# Patient Record
Sex: MALE | Race: BLACK OR AFRICAN AMERICAN | NOT HISPANIC OR LATINO
[De-identification: names, ages, dates, MRNs, and addresses within clinical notes are randomized per-mention and may not be internally consistent; named-entity substitution may affect disease eponyms.]

---

## 2020-06-08 ENCOUNTER — ERX REFILL RESPONSE (OUTPATIENT)
Age: 80
End: 2020-06-08

## 2020-06-08 RX ORDER — OBETICHOLIC ACID 10 MG/1
TAKE 1 TABLET ONCE DAILY TABLET, FILM COATED ORAL
Qty: 30 | Refills: 0

## 2020-06-10 ENCOUNTER — TELEPHONE ENCOUNTER (OUTPATIENT)
Dept: URBAN - METROPOLITAN AREA CLINIC 92 | Facility: CLINIC | Age: 80
End: 2020-06-10

## 2020-06-10 RX ORDER — OBETICHOLIC ACID 10 MG/1
TAKE 1 TABLET ONCE DAILY TABLET, FILM COATED ORAL
Qty: 30 | Refills: 0

## 2020-06-23 ENCOUNTER — ERX REFILL RESPONSE (OUTPATIENT)
Age: 80
End: 2020-06-23

## 2020-06-23 RX ORDER — URSODIOL 500 MG/1
TAKE 1 TABLET TWICE A DAY TABLET ORAL
Qty: 180 | Refills: 0

## 2020-07-27 ENCOUNTER — LAB OUTSIDE AN ENCOUNTER (OUTPATIENT)
Dept: URBAN - METROPOLITAN AREA CLINIC 86 | Facility: CLINIC | Age: 80
End: 2020-07-27

## 2020-07-28 ENCOUNTER — TELEPHONE ENCOUNTER (OUTPATIENT)
Dept: URBAN - METROPOLITAN AREA CLINIC 92 | Facility: CLINIC | Age: 80
End: 2020-07-28

## 2020-07-28 LAB
A/G RATIO: 1.2
ALBUMIN: 4.2
ALKALINE PHOSPHATASE: 343
ALT (SGPT): 61
AST (SGOT): 55
BASO (ABSOLUTE): 0
BASOS: 0
BILIRUBIN, TOTAL: 0.9
BUN/CREATININE RATIO: 20
BUN: 17
CALCIUM: 10.6
CARBON DIOXIDE, TOTAL: 24
CHLORIDE: 104
CREATININE: 0.83
EGFR IF AFRICN AM: 97
EGFR IF NONAFRICN AM: 84
EOS (ABSOLUTE): 0.3
EOS: 7
FREE THYROXINE INDEX: 2.7
GLOBULIN, TOTAL: 3.4
GLUCOSE: 134
HEMATOCRIT: 46.2
HEMATOLOGY COMMENTS:: (no result)
HEMOGLOBIN: 15.2
IMMATURE CELLS: (no result)
IMMATURE GRANS (ABS): 0
IMMATURE GRANULOCYTES: 0
INR: 1.1
LYMPHS (ABSOLUTE): 1.9
LYMPHS: 46
MCH: 29.3
MCHC: 32.9
MCV: 89
MONOCYTES(ABSOLUTE): 0.5
MONOCYTES: 13
NEUTROPHILS (ABSOLUTE): 1.4
NEUTROPHILS: 34
NRBC: (no result)
PLATELETS: 170
POTASSIUM: 4.8
PROTEIN, TOTAL: 7.6
PROTHROMBIN TIME: 11
RBC: 5.18
RDW: 12.3
SODIUM: 144
T3 UPTAKE: 32
THYROXINE (T4): 8.5
TRIIODOTHYRONINE (T3): 165
TSH: <0.005
WBC: 4.1

## 2020-07-28 NOTE — HPI-TODAY'S VISIT:
Dear Roel Trejo The results of your recent tests are explained below: tsh very low and show local md.  T4 normal 8.5.  inr 1.1 and tb 0.9 and alk 343 and ast 55 and alt 61 and tb 0.9 and alb 4.2 and ca 10.6 elevated and show local md also. na 144 and k 4.8. glu 134 elevated and maybe not fasting. cr 0.83. wbc 4.1 hg 15.3 plat 170.  April ast 67 and alt 66 and alk 362 and tb 0.9 so liver labs little lower and alk little lower also. calcium prior 10.1 so up now: taking supplements? show to local md.  If do not have local md watching thyroid need to refer to endocrine specialist.

## 2020-08-06 ENCOUNTER — TELEPHONE ENCOUNTER (OUTPATIENT)
Dept: URBAN - METROPOLITAN AREA CLINIC 92 | Facility: CLINIC | Age: 80
End: 2020-08-06

## 2020-08-06 RX ORDER — OBETICHOLIC ACID 10 MG/1
1 TABLET TABLET, FILM COATED ORAL EVERY OTHER DAY
Qty: 15 | Refills: 2

## 2020-08-06 RX ORDER — URSODIOL 500 MG/1
TAKE 1 TABLET TWICE A DAY TABLET ORAL TWICE A DAY
Qty: 180 | Refills: 1

## 2020-08-08 ENCOUNTER — TELEPHONE ENCOUNTER (OUTPATIENT)
Dept: URBAN - METROPOLITAN AREA CLINIC 92 | Facility: CLINIC | Age: 80
End: 2020-08-08

## 2020-08-08 NOTE — HPI-TODAY'S VISIT:
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 Prior Liver bx showed bridging fibrosis. He is on urosodiol 300mg TID and has been on for years Ocaliva 10mg. A copy of this document will be sent to the referring provider.  The patient relates no significant family or personal history of liver disease. He states no history of new medications or alcohol use. The patient reports a personal history of no other habits that could cause liver damage.  Interval testing includes:     Pt is on the Ocaliva every other q other day.  They did approve the Ocaliva and we not have received it yet. Approved through april 2021.  He cut back to q other day. labs stable so keep on this dose.  4- wbc 4.0 and hg 14.3 and plat 155 and neutrophils 1.2 little low. glu 130 and cr 0.78 and na 140 and k 4.4 and cl 101 and co2 22 and alb 4.3 and tb 0.9 and alk 362 and  ast 67 and alt 66 and inr 1.1.  2-17-20 and wbc 4.1 hg 14.5 plat 153 and neutrophils 1.3 and glu 143 and cr 0.96 and na 140 and k 4.3 and cl 100 and co2 25 and alb 4.0 and tb 0.8 and alk 352 and ast 66 and alt 61 and inr 1.0.  12-16-19 and wbc 3.6 and hg 14.3 and plat 184 and neutrophils 1.3 and glu 124 and cr 0.86 and na 142 and k 5.0 and cl 102 and cl2 25 and alb 4.3 and tb 0.9 and alk 353 and ast 70 and alt 67 and inr 1.0.  12- u/s coarse hepatic ehcotexure and consistent with cirrhosis and 1.2cm hepatic cyst. Patent vessels and right renal cysts  up to 5.7cm and ssome nonobstructing right renal calculi. Spleen 12.3cm.   He sees urology and watching this and he sees him once a year.  Oct 14 2019 wbc 3.8 and hg 14.4 plat 177 and neutrophils 1.3 little low and glu 123 and cr 0.74 and na 141 and k 4.5 and cl 100 and co2 25 and alb 4.3 and tb 0.9 and alk 336 ast 67 and alt 61.  hep b immune 40.5  Sept 18 2019 na 140 and k 4.7 and cl 103 and glu 121 and bun 12 and cr 0.79 alb 3,8 and tb 0,9 and ca 9.6 and ast 49 and alt 48 and alk 334 and a1c 5.7 and chol 228 and trg 52 and hdl 86 and ldl 132 nd psa 0.01 and wbc 4.2 and hg 144 and plat 183.   June 19 2019 and liver midly coarse and 1cm hepatic cyst and stable and gb not distended and no stones and bile ducts not dilated and spleen stable 12.3cm abd right kdiney 11cm and sevreal right kidney cysts up to 6.2cm.  Saw stone sin the right kidneuy. No hydronephrosios. Vessels patent.  No change vs 2018.   Dec 2018 u.s with liver appearing fatty and patent vessels. Right kidney cyst 6.1x4.7x5.3cm stable abd simple.  Liver cyst 1.1x0.8x1.1.cm. Similar to prior scan.  April 2019 labs wbc 4.3 hg 14.7 plat 183 and glu 134 and cr 0.85 and na 141 k 5.1 and tb 1.0 and alk 446 and ast 85 and alt 91.  Feb 2019 alk 418 and ast 79 and alt 81.  Dec 2018 alk 440 and tb 0.9 and db 0.48 and ast 76 and alt 92. Liver 76% and bone 20% and intestine 4%.  11/26/2018: wbc 4.6, hgb 14.7, plts 175,000, gluc 130, vret 0.89, na 142, k 4.0, alb 4.4, frances 2.9, t.bili 1.0, , AST 75, ASLT 80,INR 1.0, TSH 2.26  9/04/2018: wbc 4.6, hgb 14.4 plts 166,000, gluc 130, creat 0.95, na 143, k 4.5, alb 4.3, frances 3.1, t.bili 0.8, , AST56, ALT 62, INR 1.0, TSH 2.17,  6/05/2018: HBsAb 4.5, HBsAg neg, HBcAb neg, HAV  pos  6/20/2018: liver echogenic suggesting mild fatty infiltration, simle cyst 1.1 x 0.7 x 1.3,, gallbladder unremarkable, mpv  patent, cbd 4mm, right kidney simple cyst 5.6 x 4.8 x 5.2 cm, echoegenic focus 1.3cm consider kidney stone.  U/s showed fatty liver and he was placed on a low glycemic carbohydrate and stable on this.  His cholesterol is checked by Dr. Smith. he says cholesterol has been ok.  Recommended him again to get a bone density and he did this at [facility]: 11-13-19: spine normal and t score 0.1 and z score 0.1/ femur neck -0.9 and -0.4 and femur total -1.0 and -0.9. He will share with primary md.  We rediscussed that he needs at least  4 hour window with the ocaliva and welchol.  Otherwise he may not be absorbing the ocaliva.  He is not doing any teas or herbals.  Plan is to do labs in 3 m and see how doing and not having the sig pruritis then may want to try to do the full dose pendign how he is and the labs.  He will call us if any issues in the interim.  Duration of the visit was min office visit with greater than 50% of the time spent on coordination of care and in reviewing chart with the pt.

## 2020-08-10 ENCOUNTER — OFFICE VISIT (OUTPATIENT)
Dept: URBAN - METROPOLITAN AREA TELEHEALTH 2 | Facility: TELEHEALTH | Age: 80
End: 2020-08-10
Payer: MEDICARE

## 2020-08-10 DIAGNOSIS — E66.3 OVERWEIGHT: ICD-10-CM

## 2020-08-10 DIAGNOSIS — L29.9 PRURITIC CONDITION: ICD-10-CM

## 2020-08-10 DIAGNOSIS — R74.8 ELEVATED ALKALINE PHOSPHATASE LEVEL: ICD-10-CM

## 2020-08-10 DIAGNOSIS — R94.5 LIVER FUNCTION STUDY, ABNORMAL: ICD-10-CM

## 2020-08-10 DIAGNOSIS — Z79.899 HIGH RISK MEDICATION USE: ICD-10-CM

## 2020-08-10 DIAGNOSIS — E11.9 DIABETES: ICD-10-CM

## 2020-08-10 DIAGNOSIS — K76.0 FATTY LIVER: ICD-10-CM

## 2020-08-10 DIAGNOSIS — Z13.820 SCREENING FOR OSTEOPOROSIS: ICD-10-CM

## 2020-08-10 DIAGNOSIS — N20.0 RENAL STONE: ICD-10-CM

## 2020-08-10 DIAGNOSIS — N28.1 RENAL CYST: ICD-10-CM

## 2020-08-10 DIAGNOSIS — K74.0 HEPATIC FIBROSIS: ICD-10-CM

## 2020-08-10 DIAGNOSIS — K74.3 PBC (PRIMARY BILIARY CIRRHOSIS): ICD-10-CM

## 2020-08-10 PROCEDURE — G8417 CALC BMI ABV UP PARAM F/U: HCPCS

## 2020-08-10 PROCEDURE — 99214 OFFICE O/P EST MOD 30 MIN: CPT

## 2020-08-10 PROCEDURE — 1036F TOBACCO NON-USER: CPT

## 2020-08-10 PROCEDURE — G9903 PT SCRN TBCO ID AS NON USER: HCPCS

## 2020-08-10 PROCEDURE — G8427 DOCREV CUR MEDS BY ELIG CLIN: HCPCS

## 2020-08-10 RX ORDER — AMLODIPINE BESYLATE 5 MG/1
TAKE 1 TABLET (5 MG) BY ORAL ROUTE ONCE DAILY TABLET ORAL 1
Qty: 0 | Refills: 0 | Status: ACTIVE | COMMUNITY
Start: 1900-01-01

## 2020-08-10 RX ORDER — URSODIOL 500 MG/1
TAKE 1 TABLET TWICE A DAY TABLET ORAL TWICE A DAY
Qty: 180 | Refills: 1 | Status: ACTIVE | COMMUNITY

## 2020-08-10 RX ORDER — OBETICHOLIC ACID 10 MG/1
1 TABLET TABLET, FILM COATED ORAL EVERY OTHER DAY
Qty: 15 | Refills: 2 | Status: ACTIVE | COMMUNITY

## 2020-08-10 RX ORDER — GLIPIZIDE 5 MG/1
0.5 TABLET 30 MINUTES BEFORE BREAKFAST TABLET ORAL ONCE A DAY
Status: ACTIVE | COMMUNITY

## 2020-08-10 NOTE — EXAM-PHYSICAL EXAM
Telemed:  Gen: no distress. Eyes: no jaundice. Mouth: no thrush. CV: no chest pain. Resp: no wheezes. Abd: no pain. Ext: no edema.	 Neuro: no weakness.

## 2020-08-10 NOTE — HPI-TODAY'S VISIT:
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   Prior Liver bx showed bridging fibrosis. He is on urosodiol 300mg TID and has been on for years Ocaliva 10mg.   A copy of this document will be sent to the referring provider.   The patient relates no significant family or personal history of liver disease. He states no history of new medications or alcohol use. The patient reports a personal history of no other habits that could cause liver damage.   Interval testing includes:    Pt is on the Ocaliva every other q other day.   They did approve the Ocaliva and we not have received it yet. Approved through 2021.  He cut back to q other day. labs stable so keep on this dose.  2020: tsh very low and show local md.  T4 normal 8.5.  inr 1.1 and tb 0.9 and alk 343 and ast 55 and alt 61 and tb 0.9 and alb 4.2 and ca 10.6 elevated and show local md also. na 144 and k 4.8. glu 134 elevated and maybe not fasting. cr 0.83. wbc 4.1 hg 15.3 plat 170.  Prior April ast 67 and alt 66 and alk 362 and tb 0.9 so liver labs little lower this last time despite the low dose and alk little lower also. calcium prior 10.1 and is not on any calcium supplements.   Needs to thyroid level to local doctor.    2020 wbc 4.0 and hg 14.3 and plat 155 and neutrophils 1.2 little low. glu 130 and cr 0.78 and na 140 and k 4.4 and cl 101 and co2 22 and alb 4.3 and tb 0.9 and alk 362 and  ast 67 and alt 66 and inr 1.1.  2-17-20 and wbc 4.1 hg 14.5 plat 153 and neutrophils 1.3 and glu 143 and cr 0.96 and na 140 and k 4.3 and cl 100 and co2 25 and alb 4.0 and tb 0.8 and alk 352 and ast 66 and alt 61 and inr 1.0.  12-16-19 and wbc 3.6 and hg 14.3 and plat 184 and neutrophils 1.3 and glu 124 and cr 0.86 and na 142 and k 5.0 and cl 102 and cl2 25 and alb 4.3 and tb 0.9 and alk 353 and ast 70 and alt 67 and inr 1.0.  last u/s was done in dec 2019:  He did one today at [facility]:   Document Type:	US Abdomen Doppler Complete Document Date:	August 10, 2020 10:04  Document Status:	Auth (Verified) Document Title:	US Abdomen Doppler Complete Performed By:	Jack Martin  Verified By:	Candelaria Rodriguez on August 10, 2020 11:41  Encounter info:	[id number], [facility] Single Visit OP, 8/10/2020 -    * Final Report *  Reason For Exam primary billary cholangitis  REPORT EXAM: US Abdomen Doppler Complete, US Abdomen Complete  CLINICAL INDICATION: Primary billary cholangitis.  TECHNIQUE: Grayscale, pulsed wave and color Doppler sonography of the upper abdomen were performed. [id number]  COMPARISON: None  FINDINGS:  Liver: Coarsened echotexture. No lesions.  Bile Ducts: No dilated intrahepatic biliary radicles. Common duct measures 4 mm.  Gallbladder: Normal. Wick's sign is negative.  Pancreas: Partially obscured by overlying structures.  Right Kidney: Measures 11.4 cm. Normal echogenicity. No hydronephrosis. 6.4 x 5.9 x 5.3 cm inferior pole cystic lesion with internal thickened septation versus areas of nodularity, indeterminate. Hyperechoic nonshadowing cortical focus measuring 0.5 x 0.4 x 0.2 cm.. Nonshadowing hyperechoic focus near the corticomedullary junction measuring 1.1 x 0.8 x 0.6 cm.  Left Kidney: Measures 10.6 cm. Normal echogenicity. No hydronephrosis.  Spleen: Measures 12.2 cm.  Aorta: Normal caliber where imaged.  IVC: Normal proximally, not imaged distally.   Hepatic Veins: Normal.  Portal Veins: Hepatopetal flow. Velocity of 27 cm/s in the main portal vein, 25 cm/s in the right portal vein, and 20 cm/s in the left portal vein.  Hepatic Arteries: Peak systolic velocity 115 cm/s. Resistive index 0.77.  Other: None.  IMPRESSION: 1. Complex cystic lesion within the right kidney with thickened septation/areas of nodularity. Recommend MRI for further evaluation to exclude enhancing/solid components. Nonshadowing echogenic foci in the right kidney may represent nonobstructing stones although underlying lesions cannot be excluded. This can be evaluated at the time of MRI. 2. Coarsened hepatic echotexture can be seen with hepatic steatosis or chronic liver disease. No intra or extrahepatic biliary ductal dilation. Patent hepatic vasculature.  The images were reviewed and interpreted by Candelaria Rodriguez MD.  Signature Line *** Final ***  Electronically Signed By:  Candelaria Rodriguez on  08/10/2020 11:41  Dictated by:  Jack Martin  He has a urologist at [facility] and he should be able to see this. I will print the us report and he can get the name of the doctor then we can fax the report to them to review and they recommend to do an Mri to look further at the areas of thickening.  2019 u/s coarse hepatic ehcotexure and consistent with cirrhosis and 1.2cm hepatic cyst. Patent vessels and right renal cysts  up to 5.7cm and some nonobstructing right renal calculi. Spleen 12.3cm.   He sees urology and he has been seeing him once a year.  Oct 14 2019 wbc 3.8 and hg 14.4 plat 177 and neutrophils 1.3 little low and glu 123 and cr 0.74 and na 141 and k 4.5 and cl 100 and co2 25 and alb 4.3 and tb 0.9 and alk 336 ast 67 and alt 61.  hep b immune 40.5  2019 na 140 and k 4.7 and cl 103 and glu 121 and bun 12 and cr 0.79 alb 3,8 and tb 0,9 and ca 9.6 and ast 49 and alt 48 and alk 334 and a1c 5.7 and chol 228 and trg 52 and hdl 86 and ldl 132 nd psa 0.01 and wbc 4.2 and hg 144 and plat 183.   2019 and liver midly coarse and 1cm hepatic cyst and stable and gb not distended and no stones and bile ducts not dilated and spleen stable 12.3cm abd right kdiney 11cm and sevreal right kidney cysts up to 6.2cm.  Saw stone sin the right kidneuy. No hydronephrosios. Vessels patent.  No change vs 2018.   Dec 2018 u.s with liver appearing fatty and patent vessels. Right kidney cyst 6.1x4.7x5.3cm stable abd simple.  Liver cyst 1.1x0.8x1.1.cm. Similar to prior scan.  2019 labs wbc 4.3 hg 14.7 plat 183 and glu 134 and cr 0.85 and na 141 k 5.1 and tb 1.0 and alk 446 and ast 85 and alt 91.  2019 alk 418 and ast 79 and alt 81.  Dec 2018 alk 440 and tb 0.9 and db 0.48 and ast 76 and alt 92. Liver 76% and bone 20% and intestine 4%.  2018: wbc 4.6, hgb 14.7, plts 175,000, gluc 130, vret 0.89, na 142, k 4.0, alb 4.4, frances 2.9, t.bili 1.0, , AST 75, ASLT 80,INR 1.0, TSH 2.26  2018: wbc 4.6, hgb 14.4 plts 166,000, gluc 130, creat 0.95, na 143, k 4.5, alb 4.3, frances 3.1, t.bili 0.8, , AST56, ALT 62, INR 1.0, TSH 2.17,  2018: HBsAb 4.5, HBsAg neg, HBcAb neg, HAV  pos  2018: liver echogenic suggesting mild fatty infiltration, simle cyst 1.1 x 0.7 x 1.3,, gallbladder unremarkable, mpv  patent, cbd 4mm, right kidney simple cyst 5.6 x 4.8 x 5.2 cm, echoegenic focus 1.3cm consider kidney stone.  U/s showed fatty liver and he was placed on a low glycemic carbohydrate and stable on this.  His cholesterol is checked by Dr. Smith. he says cholesterol has been ok.  Recommended him again to get a bone density and he did this at [facility]: 19: spine normal and t score 0.1 and z score 0.1/ femur neck -0.9 and -0.4 and femur total -1.0 and -0.9. He will share with primary md.  We prior rediscussed that he needs at least  4 hour window with the ocaliva and welchol.  Otherwise he may not be absorbing the ocaliva.  He is not doing any teas or herbals.  Plan: 1. redo the labs in 3 m. 2. He will have the report sent to the urologist when he gets us the info to review as [facility] recommends doing an mri and may want to do locally or qat least review with him. 3. [facility] can burn it on disc for him to show if not able to be seen there. 4. Pt will stay on the qod ocaliva and we will reassess then.  Stressed to pt the need for social distancing and strict handwashing and wearing a mask and to follow any other new or added CDC recommendations as this is an evolving target.  Duration of the visit was 27 min office visit with greater than 50% of the time spent on coordination of care and in reviewing chart with the pt.   More than half of the face-to-face time used for counseling and coordination of care.  Patient seen today via telehealth by agreement and consent of patient in light of current COVID-19 pandemic. I used video conferencing during the visit. The patient encounter is appropriate and reasonable under the circumstances given the patient's particular presentation at this time. The patient has been advised of the followin) the potential risks and limitations of this mode of treatment (including but not limited to the absence of in-person examination); 2) the right to refuse telehealth services at any point without affecting the right to future care; 3) the right to receive in-person services, included immediately after this consultation if an urgent need arises; 4) information, including identifiable images or information from this telehealth consult, will only be shared in accordance with HIPAA regulations. Any and all of the patient's and/or patient's family member's questions on this issue have been answered. The patient has verbally consented to be treated via telehealth services. The patient has also been advised to contact this office for worsening conditions or problems, and seek emergency medical treatment and/or call 911 if the patient deems either necessary.

## 2020-09-15 ENCOUNTER — TELEPHONE ENCOUNTER (OUTPATIENT)
Dept: URBAN - METROPOLITAN AREA CLINIC 92 | Facility: CLINIC | Age: 80
End: 2020-09-15

## 2020-09-15 NOTE — HPI-TODAY'S VISIT:
Khanh Mr Person,  Saw local mri: 9-1: nonenhancing renal cyst and no definite renal mass. Largest cyst right kidney 5.7cm.  Not surprisingly they thought liver appeared to be cirrhotic. Also apparent nonenhancing cysts in the liver up to 10.6mm. Nonspecific biliary dilation in liver. Extrahepatic duct appears decompressed. Nonspecific splenomegaly seen and no significant varices seen. Left adrenal suspected adenoma. Appendix seemed somewhat prominent to then at 6.4mm but was probably similar to prior per them. No definite gallstones.  Prior [place] imaging liver coarsened. They recommended mri to better see possible complex renal cyst.  Dr Longoria

## 2020-09-25 ENCOUNTER — TELEPHONE ENCOUNTER (OUTPATIENT)
Dept: URBAN - METROPOLITAN AREA CLINIC 92 | Facility: CLINIC | Age: 80
End: 2020-09-25

## 2020-09-25 NOTE — HPI-OTHER HISTORIES
spoke with pt and we need to get the disc from [facility] and drop it off here then we can get it reread and resee what they say about it.  He will do that. the vague reading will get better when more scans to compare to.   Then we can decide if redo the mri in 3m or 6m.

## 2020-11-10 ENCOUNTER — LAB OUTSIDE AN ENCOUNTER (OUTPATIENT)
Dept: URBAN - METROPOLITAN AREA TELEHEALTH 2 | Facility: TELEHEALTH | Age: 80
End: 2020-11-10

## 2020-11-25 ENCOUNTER — LAB OUTSIDE AN ENCOUNTER (OUTPATIENT)
Dept: URBAN - METROPOLITAN AREA CLINIC 86 | Facility: CLINIC | Age: 80
End: 2020-11-25

## 2020-11-26 ENCOUNTER — TELEPHONE ENCOUNTER (OUTPATIENT)
Dept: URBAN - METROPOLITAN AREA CLINIC 92 | Facility: CLINIC | Age: 80
End: 2020-11-26

## 2020-11-26 LAB
A/G RATIO: 1.6
ALBUMIN: 4.2
ALKALINE PHOSPHATASE: 354
ALT (SGPT): 60
AST (SGOT): 63
BASO (ABSOLUTE): 0
BASOS: 1
BILIRUBIN, TOTAL: 1.1
BUN/CREATININE RATIO: 18
BUN: 15
CALCIUM: 9.8
CARBON DIOXIDE, TOTAL: 25
CHLORIDE: 101
CREATININE: 0.85
EGFR IF AFRICN AM: 96
EGFR IF NONAFRICN AM: 83
EOS (ABSOLUTE): 0.3
EOS: 7
GLOBULIN, TOTAL: 2.7
GLUCOSE: 117
HEMATOCRIT: 44.4
HEMATOLOGY COMMENTS:: (no result)
HEMOGLOBIN: 15
IMMATURE CELLS: (no result)
IMMATURE GRANS (ABS): 0
IMMATURE GRANULOCYTES: 0
LYMPHS (ABSOLUTE): 2
LYMPHS: 47
MCH: 30.2
MCHC: 33.8
MCV: 90
MONOCYTES(ABSOLUTE): 0.6
MONOCYTES: 13
NEUTROPHILS (ABSOLUTE): 1.3
NEUTROPHILS: 32
NRBC: (no result)
PLATELETS: 150
POTASSIUM: 4.4
PROTEIN, TOTAL: 6.9
RBC: 4.96
RDW: 13
SODIUM: 139
WBC: 4.2

## 2020-11-26 NOTE — HPI-TODAY'S VISIT:
Dear Roel Trejo The results of your recent tests are explained below: nov 25: wbc 4.2 hg 15 and plat 150 and mcv 90.  Neutrophils 1.3 and prior 1.4 and lymphs 2.0 and prior 1.9. tb 1.1 and alk 354 and ast 63 and alt 60 and prior tb 0.9 and alk 343 and ast 55 and alt 61.  so about the same. glu 117 and down from 134. bun 15 and cr 0.85 and na 139 and k 4.4 and cl 101 and co2 25. alb 4.2 normal.

## 2020-12-11 ENCOUNTER — OFFICE VISIT (OUTPATIENT)
Dept: URBAN - METROPOLITAN AREA TELEHEALTH 2 | Facility: TELEHEALTH | Age: 80
End: 2020-12-11
Payer: MEDICARE

## 2020-12-11 DIAGNOSIS — R74.8 ELEVATED ALKALINE PHOSPHATASE LEVEL: ICD-10-CM

## 2020-12-11 DIAGNOSIS — K74.00 HEPATIC FIBROSIS: ICD-10-CM

## 2020-12-11 DIAGNOSIS — Z79.899 HIGH RISK MEDICATION USE: ICD-10-CM

## 2020-12-11 DIAGNOSIS — K74.3 PBC (PRIMARY BILIARY CIRRHOSIS): ICD-10-CM

## 2020-12-11 PROCEDURE — 99214 OFFICE O/P EST MOD 30 MIN: CPT

## 2020-12-11 RX ORDER — AMLODIPINE BESYLATE 5 MG/1
TAKE 1 TABLET (5 MG) BY ORAL ROUTE ONCE DAILY TABLET ORAL 1
Qty: 0 | Refills: 0 | Status: ACTIVE | COMMUNITY
Start: 1900-01-01

## 2020-12-11 RX ORDER — OBETICHOLIC ACID 10 MG/1
1 TABLET TABLET, FILM COATED ORAL EVERY OTHER DAY
Qty: 15 | Refills: 2 | Status: ACTIVE | COMMUNITY

## 2020-12-11 RX ORDER — URSODIOL 500 MG/1
TAKE 1 TABLET TWICE A DAY TABLET ORAL TWICE A DAY
Qty: 180 | Refills: 0

## 2020-12-11 RX ORDER — OBETICHOLIC ACID 10 MG/1
1 TABLET TABLET, FILM COATED ORAL EVERY OTHER DAY
Qty: 15 | Refills: 1

## 2020-12-11 RX ORDER — URSODIOL 500 MG/1
TAKE 1 TABLET TWICE A DAY TABLET ORAL TWICE A DAY
Qty: 180 | Refills: 1 | Status: ACTIVE | COMMUNITY

## 2020-12-11 RX ORDER — GLIPIZIDE 5 MG/1
0.5 TABLET 30 MINUTES BEFORE BREAKFAST TABLET ORAL ONCE A DAY
Status: ACTIVE | COMMUNITY

## 2020-12-11 NOTE — EXAM-PHYSICAL EXAM
Telemed self reported:  Gen: no distress. Eyes: no jaundice. Mouth: no thrush. CV: no chest pain. Resp: no wheezes. Abd: no pain. Ext: no edema.	 Neuro: no weakness. Skin: occ pruritis still.

## 2020-12-11 NOTE — HPI-TODAY'S VISIT:
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   Prior Liver bx showed bridging fibrosis. He is on urosodiol 300mg TID and has been on for years Ocaliva 10mg.   A copy of this document will be sent to the referring provider.   Pt says taking ocaliva 10mg q other day. Itching is more tolerable with this.  He did labs .  : wbc 4.2 hg 15 and plat 150 and mcv 90.  Neutrophils 1.3 and prior 1.4 and lymphs 2.0 and prior 1.9. tb 1.1 and alk 354 and ast 63 and alt 60 and prior tb 0.9 and alk 343 and ast 55 and alt 61.  So about the same. glu 117 and down from 134. bun 15 and cr 0.85 and na 139 and k 4.4 and cl 101 and co2 25. alb 4.2 normal.   Saw local mri: : nonenhancing renal cyst and no definite renal mass. Largest cyst right kidney 5.7cm.  Not surprisingly they thought liver appeared to be cirrhotic. Also apparent nonenhancing cysts in the liver up to 10.6mm. Nonspecific biliary dilation in liver. Extrahepatic duct appears decompressed. Nonspecific splenomegaly seen and no significant varices seen. Left adrenal suspected adenoma. Appendix seemed somewhat prominent to then at 6.4mm but was probably similar to prior per them. No definite gallstones.  Prior [place] imaging liver coarsened. They recommended mri to better see possible complex renal cyst.  Saw urologist re the kidney issue was stable. They resee him again in .  The patient relates no significant family or personal history of liver disease. He states no history of new medications or alcohol use. The patient reports a personal history of no other habits that could cause liver damage.   Approved through 2021.  2020: tsh very low and show local md.  T4 normal 8.5.  inr 1.1 and tb 0.9 and alk 343 and ast 55 and alt 61 and tb 0.9 and alb 4.2 and ca 10.6 elevated and show local md also. na 144 and k 4.8. glu 134 elevated and maybe not fasting. cr 0.83. wbc 4.1 hg 15.3 plat 170.    Prior April ast 67 and alt 66 and alk 362 and tb 0.9 so liver labs little lower this last time despite the low dose and alk little lower also. calcium prior 10.1 and is not on any calcium supplements.   He says local doctor following thyroid and says he is doing better.   2020 wbc 4.0 and hg 14.3 and plat 155 and neutrophils 1.2 little low. glu 130 and cr 0.78 and na 140 and k 4.4 and cl 101 and co2 22 and alb 4.3 and tb 0.9 and alk 362 and  ast 67 and alt 66 and inr 1.1.  - and wbc 4.1 hg 14.5 plat 153 and neutrophils 1.3 and glu 143 and cr 0.96 and na 140 and k 4.3 and cl 100 and co2 25 and alb 4.0 and tb 0.8 and alk 352 and ast 66 and alt 61 and inr 1.0.  19 and wbc 3.6 and hg 14.3 and plat 184 and neutrophils 1.3 and glu 124 and cr 0.86 and na 142 and k 5.0 and cl 102 and cl2 25 and alb 4.3 and tb 0.9 and alk 353 and ast 70 and alt 67 and inr 1.0.  Document Type:	US Abdomen Doppler Complete Document Date:	August 10, 2020 10:04  Document Status:	Auth (Verified) Document Title:	US Abdomen Doppler Complete Performed By:	Jack Martin  Verified By:	Candelaria Rodriguez on August 10, 2020 11:41  Encounter info:	[id number], [facility], Single Visit OP, 8/10/2020 -    * Final Report *  Reason For Exam primary billary cholangitis  REPORT EXAM: US Abdomen Doppler Complete, US Abdomen Complete  CLINICAL INDICATION: Primary billary cholangitis.  TECHNIQUE: Grayscale, pulsed wave and color Doppler sonography of the upper abdomen were performed. [id number]  COMPARISON: None  FINDINGS:  Liver: Coarsened echotexture. No lesions.  Bile Ducts: No dilated intrahepatic biliary radicles. Common duct measures 4 mm.  Gallbladder: Normal. Wick's sign is negative.  Pancreas: Partially obscured by overlying structures.  Right Kidney: Measures 11.4 cm. Normal echogenicity. No hydronephrosis. 6.4 x 5.9 x 5.3 cm inferior pole cystic lesion with internal thickened septation versus areas of nodularity, indeterminate. Hyperechoic nonshadowing cortical focus measuring 0.5 x 0.4 x 0.2 cm.. Nonshadowing hyperechoic focus near the corticomedullary junction measuring 1.1 x 0.8 x 0.6 cm.  Left Kidney: Measures 10.6 cm. Normal echogenicity. No hydronephrosis.  Spleen: Measures 12.2 cm.  Aorta: Normal caliber where imaged.  IVC: Normal proximally, not imaged distally.   Hepatic Veins: Normal.  Portal Veins: Hepatopetal flow. Velocity of 27 cm/s in the main portal vein, 25 cm/s in the right portal vein, and 20 cm/s in the left portal vein.  Hepatic Arteries: Peak systolic velocity 115 cm/s. Resistive index 0.77.  Other: None.  IMPRESSION: 1. Complex cystic lesion within the right kidney with thickened septation/areas of nodularity. Recommend MRI for further evaluation to exclude enhancing/solid components. Nonshadowing echogenic foci in the right kidney may represent nonobstructing stones although underlying lesions cannot be excluded. This can be evaluated at the time of MRI. 2. Coarsened hepatic echotexture can be seen with hepatic steatosis or chronic liver disease. No intra or extrahepatic biliary ductal dilation. Patent hepatic vasculature.  The images were reviewed and interpreted by Candelaria Rodriguez MD.  Signature Line *** Final ***  Electronically Signed By:  Candelaria Rodriguez on  08/10/2020 11:41  Dictated by:  Jack Martin  2019 u/s coarse hepatic ehcotexure and consistent with cirrhosis and 1.2cm hepatic cyst. Patent vessels and right renal cysts  up to 5.7cm and some nonobstructing right renal calculi. Spleen 12.3cm.   Oct 14 2019 wbc 3.8 and hg 14.4 plat 177 and neutrophils 1.3 little low and glu 123 and cr 0.74 and na 141 and k 4.5 and cl 100 and co2 25 and alb 4.3 and tb 0.9 and alk 336 ast 67 and alt 61.  hep b immune 40.5  2019 na 140 and k 4.7 and cl 103 and glu 121 and bun 12 and cr 0.79 alb 3,8 and tb 0,9 and ca 9.6 and ast 49 and alt 48 and alk 334 and a1c 5.7 and chol 228 and trg 52 and hdl 86 and ldl 132 nd psa 0.01 and wbc 4.2 and hg 144 and plat 183.   2019 and liver midly coarse and 1cm hepatic cyst and stable and gb not distended and no stones and bile ducts not dilated and spleen stable 12.3cm abd right kdiney 11cm and sevreal right kidney cysts up to 6.2cm.  Saw stone sin the right kidneuy. No hydronephrosios. Vessels patent.  No change vs 2018.   Dec 2018 u.s with liver appearing fatty and patent vessels. Right kidney cyst 6.1x4.7x5.3cm stable abd simple.  Liver cyst 1.1x0.8x1.1.cm. Similar to prior scan.  2019 labs wbc 4.3 hg 14.7 plat 183 and glu 134 and cr 0.85 and na 141 k 5.1 and tb 1.0 and alk 446 and ast 85 and alt 91.  2019 alk 418 and ast 79 and alt 81.  Dec 2018 alk 440 and tb 0.9 and db 0.48 and ast 76 and alt 92. Liver 76% and bone 20% and intestine 4%.  2018: wbc 4.6, hgb 14.7, plts 175,000, gluc 130, vret 0.89, na 142, k 4.0, alb 4.4, frances 2.9, t.bili 1.0, , AST 75, ASLT 80,INR 1.0, TSH 2.26  2018: wbc 4.6, hgb 14.4 plts 166,000, gluc 130, creat 0.95, na 143, k 4.5, alb 4.3, frances 3.1, t.bili 0.8, , AST56, ALT 62, INR 1.0, TSH 2.17,  2018: HBsAb 4.5, HBsAg neg, HBcAb neg, HAV  pos  2018: liver echogenic suggesting mild fatty infiltration, simle cyst 1.1 x 0.7 x 1.3,, gallbladder unremarkable, mpv  patent, cbd 4mm, right kidney simple cyst 5.6 x 4.8 x 5.2 cm, echoegenic focus 1.3cm consider kidney stone.  U/s showed fatty liver and he was placed on a low glycemic carbohydrate and stable on this.  His cholesterol is checked by Dr. Sachin Dimas. He says cholesterol has been ok.  Recommended him again to get a bone density and he did this at [facility]: 19: spine normal and t score 0.1 and z score 0.1/ femur neck -0.9 and -0.4 and femur total -1.0 and -0.9. He shared with primary md.  We prior rediscussed that he needs at least  4 hour window with the ocaliva and welchol.  Otherwise he may not be absorbing the ocaliva.  He is not doing any teas or herbals.  Plan: 1. Redo the labs in 3 m. Stay on this dose of the ocaliva. 2. Plan to redo the mri here in  late then we can see in feb and have the disc taken to urology. 3. Pt will call if any issues.   Stressed to pt the need for social distancing and strict handwashing and wearing a mask and to follow any other new or added CDC recommendations as this is an evolving target.  Duration of the visit was 27 min via [employer] video for this telemed office visit with greater than 50% of the time spent on coordination of care and in reviewing chart with the pt.   More than half of the face-to-face time used for counseling and coordination of care.  Patient seen today via telehealth by agreement and consent of patient in light of current COVID-19 pandemic. I used video conferencing during the visit. The patient encounter is appropriate and reasonable under the circumstances given the patient's particular presentation at this time. The patient has been advised of the followin) the potential risks and limitations of this mode of treatment (including but not limited to the absence of in-person examination); 2) the right to refuse telehealth services at any point without affecting the right to future care; 3) the right to receive in-person services, included immediately after this consultation if an urgent need arises; 4) information, including identifiable images or information from this telehealth consult, will only be shared in accordance with HIPAA regulations. Any and all of the patient's and/or patient's family member's questions on this issue have been answered. The patient has verbally consented to be treated via telehealth services. The patient has also been advised to contact this office for worsening conditions or problems, and seek emergency medical treatment and/or call 911 if the patient deems either necessary.

## 2020-12-22 ENCOUNTER — TELEPHONE ENCOUNTER (OUTPATIENT)
Dept: URBAN - METROPOLITAN AREA CLINIC 100 | Facility: CLINIC | Age: 80
End: 2020-12-22

## 2020-12-30 ENCOUNTER — LAB OUTSIDE AN ENCOUNTER (OUTPATIENT)
Dept: URBAN - METROPOLITAN AREA CLINIC 6 | Facility: CLINIC | Age: 80
End: 2020-12-30

## 2020-12-30 ENCOUNTER — TELEPHONE ENCOUNTER (OUTPATIENT)
Dept: URBAN - METROPOLITAN AREA CLINIC 6 | Facility: CLINIC | Age: 80
End: 2020-12-30

## 2020-12-30 NOTE — HPI-TODAY'S VISIT:
Spoke with pt. He will stop the medicine for 2 weeks and he needs to do labs now and in 2 weeks and see how he is doing.  He has priopr stopped this and then restarted at lower dose and that helps some pts to feel better,.  We will see how he does. mayneed to change to 5mg tablet so more flexibility.

## 2021-01-01 ENCOUNTER — TELEPHONE ENCOUNTER (OUTPATIENT)
Dept: URBAN - METROPOLITAN AREA CLINIC 92 | Facility: CLINIC | Age: 81
End: 2021-01-01

## 2021-01-01 LAB
A/G RATIO: 1.3
ALBUMIN: 4.3
ALKALINE PHOSPHATASE: 399
ALT (SGPT): 65
AST (SGOT): 64
BASO (ABSOLUTE): 0
BASOS: 1
BILIRUBIN, TOTAL: 0.9
BUN/CREATININE RATIO: 14
BUN: 15
CALCIUM: 10
CARBON DIOXIDE, TOTAL: 27
CHLORIDE: 99
CREATININE: 1.07
EGFR IF AFRICN AM: 76
EGFR IF NONAFRICN AM: 66
EOS (ABSOLUTE): 0.2
EOS: 5
GLOBULIN, TOTAL: 3.3
GLUCOSE: 204
HEMATOCRIT: 46.7
HEMATOLOGY COMMENTS:: (no result)
HEMOGLOBIN: 15.1
IMMATURE CELLS: (no result)
IMMATURE GRANS (ABS): 0
IMMATURE GRANULOCYTES: 0
LYMPHS (ABSOLUTE): 1.6
LYMPHS: 41
MCH: 29.7
MCHC: 32.3
MCV: 92
MONOCYTES(ABSOLUTE): 0.5
MONOCYTES: 13
NEUTROPHILS (ABSOLUTE): 1.6
NEUTROPHILS: 40
NRBC: (no result)
PLATELETS: 167
POTASSIUM: 4.7
PROTEIN, TOTAL: 7.6
RBC: 5.08
RDW: 12.7
SODIUM: 139
WBC: 3.9

## 2021-01-01 NOTE — HPI-OTHER HISTORIES
Dear Roel Trejo The results of your recent tests are explained below: dec 30 labs in middle: wbc 3.9 hg 15.1 plat 167. mcv 97.  tb 0.9 down from 1.1 and alk 399 slightly higher from 354. ast 64 and alt 66 and prior ast 63 and alt 60. na 139 and k 4.7 and cl 99 and co2 27.  glu 204 elevated so that is newer issue as prior 117 and 134 so show to local md. bun 15 and cr 1.07 little up and prior 0.85 so little dry and could be linked to the sugars. Called pt to discuss this with the pt so he can discuss with primary md.

## 2021-01-13 ENCOUNTER — LAB OUTSIDE AN ENCOUNTER (OUTPATIENT)
Dept: URBAN - METROPOLITAN AREA CLINIC 6 | Facility: CLINIC | Age: 81
End: 2021-01-13

## 2021-01-14 ENCOUNTER — TELEPHONE ENCOUNTER (OUTPATIENT)
Dept: URBAN - METROPOLITAN AREA CLINIC 92 | Facility: CLINIC | Age: 81
End: 2021-01-14

## 2021-01-15 ENCOUNTER — LAB OUTSIDE AN ENCOUNTER (OUTPATIENT)
Dept: URBAN - METROPOLITAN AREA CLINIC 86 | Facility: CLINIC | Age: 81
End: 2021-01-15

## 2021-01-17 ENCOUNTER — TELEPHONE ENCOUNTER (OUTPATIENT)
Dept: URBAN - METROPOLITAN AREA CLINIC 92 | Facility: CLINIC | Age: 81
End: 2021-01-17

## 2021-01-17 LAB
A/G RATIO: 1.3
ALBUMIN: 4.2
ALKALINE PHOSPHATASE: 392
ALT (SGPT): 68
AST (SGOT): 60
BASO (ABSOLUTE): 0
BASOS: 1
BILIRUBIN, TOTAL: 0.9
BUN/CREATININE RATIO: 18
BUN: 16
CALCIUM: 10.1
CARBON DIOXIDE, TOTAL: 27
CHLORIDE: 102
CREATININE: 0.91
EGFR IF AFRICN AM: 92
EGFR IF NONAFRICN AM: 80
EOS (ABSOLUTE): 0.2
EOS: 5
GLOBULIN, TOTAL: 3.3
GLUCOSE: 117
HEMATOCRIT: 45.2
HEMATOLOGY COMMENTS:: (no result)
HEMOGLOBIN: 14.8
IMMATURE CELLS: (no result)
IMMATURE GRANS (ABS): 0
IMMATURE GRANULOCYTES: 0
INR: 1.1
LYMPHS (ABSOLUTE): 2
LYMPHS: 49
MCH: 29.7
MCHC: 32.7
MCV: 91
MONOCYTES(ABSOLUTE): 0.5
MONOCYTES: 12
NEUTROPHILS (ABSOLUTE): 1.4
NEUTROPHILS: 33
NRBC: (no result)
PLATELETS: 165
POTASSIUM: 4.7
PROTEIN, TOTAL: 7.5
PROTHROMBIN TIME: 11.3
RBC: 4.98
RDW: 12.9
SODIUM: 141
WBC: 4.1

## 2021-01-17 NOTE — HPI-TODAY'S VISIT:
Dear Roel Trejo The results of your recent tests are explained below: San[place] 15: glu 117 elevated some but similar to nov 117 but down from 204 in dec 30. bun 16 and cr 0.91. na 141 and k 4.7 and cl 102 and ca 10.1 and alb 4.2 and tb 0.9 down from 1.1 in nov. ast 60 and alt 68 and so about the same as you can see vs other two labs and alk 392 about the same vs dec but little up from nov 354. wbc 4.1 hg 14.8 plat 165. inr 1.1 stable. So no dramatic changes seen yet and would stay off the ocaliva and see if you feel better and if not then lets see also how doing off the medicine and if not clear worsening then we can discuss what to do at next visit as we spoke on phone other day,

## 2021-01-18 ENCOUNTER — LAB OUTSIDE AN ENCOUNTER (OUTPATIENT)
Dept: URBAN - METROPOLITAN AREA TELEHEALTH 2 | Facility: TELEHEALTH | Age: 81
End: 2021-01-18

## 2021-01-22 ENCOUNTER — TELEPHONE ENCOUNTER (OUTPATIENT)
Dept: URBAN - METROPOLITAN AREA CLINIC 6 | Facility: CLINIC | Age: 81
End: 2021-01-22

## 2021-01-25 ENCOUNTER — TELEPHONE ENCOUNTER (OUTPATIENT)
Dept: URBAN - METROPOLITAN AREA CLINIC 92 | Facility: CLINIC | Age: 81
End: 2021-01-25

## 2021-01-25 NOTE — HPI-OTHER HISTORIES
Dear Roel Trejo,  Jan 23 mri: Morphologic changes of chronic liver disease without hepatocellular carcinoma seen so that is good to note.   Patent portal venous system with stigmata portal hypertension, including upper abdominal varices and splenomegaly noted so need to stay on top of egd surveillance.   Right renal cyst with area of complexity on prior ultrasound demonstrates no septation, enhancement, or nodularity on this examination, compatible with a simple cyst ( 4.7 x 6.2 x 7.8 cm.) This may have represented artifact on prior ultrasound. Continued attention on follow-up for liver disease was recommended.  Liver showed no fat or iron.  Scattered simple cysts in the left hepatic lobe. Periesophageal, perigastric, perisplenic varices seen.  Mild intrahepatic duct dilatation peripherally extending to the capsule, greater in the right hepatic lobe, compatible with primary biliary cirrhosis. No extrahepatic biliary ductal dilatation. Normal gallbladder.  Spleen was enlarged measuring 13.1 cm in the craniocaudal dimension. Adjacent splenule.  Pancreas normal.   Mild atherosclerotic disease abdominal aorta without aneurysm.  Mild degenerative changes of the spine.  We will discuss more at the visit. Please share the report with local doctor following the cystic renal lesion.  Dr Longoria

## 2021-01-26 ENCOUNTER — TELEPHONE ENCOUNTER (OUTPATIENT)
Dept: URBAN - METROPOLITAN AREA CLINIC 92 | Facility: CLINIC | Age: 81
End: 2021-01-26

## 2021-01-26 LAB
A/G RATIO: 1.3
ALBUMIN: 4
ALKALINE PHOSPHATASE: 376
ALT (SGPT): 68
AST (SGOT): 65
BASO (ABSOLUTE): 0
BASOS: 1
BILIRUBIN, TOTAL: 0.8
BUN/CREATININE RATIO: 18
BUN: 17
CALCIUM: 10.1
CARBON DIOXIDE, TOTAL: 23
CHLORIDE: 103
CREATININE: 0.93
EGFR IF AFRICN AM: 90
EGFR IF NONAFRICN AM: 78
EOS (ABSOLUTE): 0.3
EOS: 7
GLOBULIN, TOTAL: 3.1
GLUCOSE: 128
HEMATOCRIT: 43.2
HEMATOLOGY COMMENTS:: (no result)
HEMOGLOBIN: 14.5
IMMATURE CELLS: (no result)
IMMATURE GRANS (ABS): 0
IMMATURE GRANULOCYTES: 0
INR: 1
LYMPHS (ABSOLUTE): 1.8
LYMPHS: 43
MCH: 29.8
MCHC: 33.6
MCV: 89
MONOCYTES(ABSOLUTE): 0.5
MONOCYTES: 13
NEUTROPHILS (ABSOLUTE): 1.5
NEUTROPHILS: 36
NRBC: (no result)
PLATELETS: 138
POTASSIUM: 4.6
PROTEIN, TOTAL: 7.1
PROTHROMBIN TIME: 10.9
RBC: 4.86
RDW: 12.6
SODIUM: 143
WBC: 4.1

## 2021-01-26 NOTE — HPI-OTHER HISTORIES
Dear Roel Trejo The results of your recent tests are explained below: jan 25 and inr normal 1.0 and so little lower now. tb 0.8 and lower from 0.9 and alk 376 from 392 so lower and ast 65 and alt 68 and prior ast 60 and alt 68 so not much of a change seen. na 143 and k 4.6 and cl 103 and co2 23 and cr 0.93. glu 128 elevated and follow and maybe not fasting?  defer to local md re your sugars. cr 0.93.  wbc 4.1 hg 14.5 and plat 138 (down from 165) and mcv 89.  So no huge trends seen off the medicine. Will discuss at visit.

## 2021-01-27 ENCOUNTER — LAB OUTSIDE AN ENCOUNTER (OUTPATIENT)
Dept: URBAN - METROPOLITAN AREA CLINIC 6 | Facility: CLINIC | Age: 81
End: 2021-01-27

## 2021-02-02 ENCOUNTER — OFFICE VISIT (OUTPATIENT)
Dept: URBAN - METROPOLITAN AREA TELEHEALTH 2 | Facility: TELEHEALTH | Age: 81
End: 2021-02-02
Payer: MEDICARE

## 2021-02-02 DIAGNOSIS — L29.9 PRURITIC CONDITION: ICD-10-CM

## 2021-02-02 DIAGNOSIS — N28.1 RENAL CYST: ICD-10-CM

## 2021-02-02 DIAGNOSIS — E66.3 OVERWEIGHT: ICD-10-CM

## 2021-02-02 DIAGNOSIS — R94.5 LIVER FUNCTION STUDY, ABNORMAL: ICD-10-CM

## 2021-02-02 DIAGNOSIS — K74.3 PBC (PRIMARY BILIARY CIRRHOSIS): ICD-10-CM

## 2021-02-02 DIAGNOSIS — N20.0 RENAL STONE: ICD-10-CM

## 2021-02-02 DIAGNOSIS — K76.0 FATTY LIVER: ICD-10-CM

## 2021-02-02 DIAGNOSIS — Z13.820 SCREENING FOR OSTEOPOROSIS: ICD-10-CM

## 2021-02-02 DIAGNOSIS — R74.8 ELEVATED ALKALINE PHOSPHATASE LEVEL: ICD-10-CM

## 2021-02-02 DIAGNOSIS — E11.9 DIABETES: ICD-10-CM

## 2021-02-02 DIAGNOSIS — Z79.899 HIGH RISK MEDICATION USE: ICD-10-CM

## 2021-02-02 PROCEDURE — G8417 CALC BMI ABV UP PARAM F/U: HCPCS

## 2021-02-02 PROCEDURE — 99214 OFFICE O/P EST MOD 30 MIN: CPT

## 2021-02-02 PROCEDURE — G9903 PT SCRN TBCO ID AS NON USER: HCPCS

## 2021-02-02 PROCEDURE — G8482 FLU IMMUNIZE ORDER/ADMIN: HCPCS

## 2021-02-02 PROCEDURE — G8427 DOCREV CUR MEDS BY ELIG CLIN: HCPCS

## 2021-02-02 PROCEDURE — 1036F TOBACCO NON-USER: CPT

## 2021-02-02 RX ORDER — AMLODIPINE BESYLATE 5 MG/1
TAKE 1 TABLET (5 MG) BY ORAL ROUTE ONCE DAILY TABLET ORAL 1
Qty: 0 | Refills: 0 | Status: ACTIVE | COMMUNITY
Start: 1900-01-01

## 2021-02-02 RX ORDER — URSODIOL 500 MG/1
TAKE 1 TABLET TWICE A DAY TABLET ORAL TWICE A DAY
Qty: 180 | Refills: 0 | Status: ACTIVE | COMMUNITY

## 2021-02-02 RX ORDER — URSODIOL 500 MG/1
TAKE 1 TABLET TWICE A DAY TABLET ORAL TWICE A DAY
Qty: 180 | Refills: 1

## 2021-02-02 RX ORDER — GLIPIZIDE 5 MG/1
0.5 TABLET 30 MINUTES BEFORE BREAKFAST TABLET ORAL ONCE A DAY
Status: ACTIVE | COMMUNITY

## 2021-02-02 RX ORDER — OBETICHOLIC ACID 10 MG/1
1 TABLET TABLET, FILM COATED ORAL EVERY OTHER DAY
Qty: 15 | Refills: 1

## 2021-02-02 RX ORDER — GABAPENTIN 300 MG/1
1 CAPSULE CAPSULE ORAL TWICE A DAY
Status: ACTIVE | COMMUNITY

## 2021-02-02 RX ORDER — OBETICHOLIC ACID 10 MG/1
1 TABLET TABLET, FILM COATED ORAL EVERY OTHER DAY
Qty: 15 | Refills: 1 | Status: ON HOLD | COMMUNITY

## 2021-02-02 NOTE — EXAM-PHYSICAL EXAM
Telemed self reported:  Gen: no distress. Eyes: no jaundice. Mouth: no thrush. CV: no chest pain. Resp: no wheezes. Abd: no pain. Ext: no edema.	 Neuro: no weakness.Neuropathy is better

## 2021-02-02 NOTE — HPI-TODAY'S VISIT:
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  4 weeks off the medicine: he says feels a little less itching and he says that he neuropathy issue and started on gabapentin and that helping some.  4 weeks off labs:  jan 25 2021 and inr normal 1.0 and so little lower now. tb 0.8 and lower from 0.9 and alk 376 from 392 so lower and ast 65 and alt 68 and prior ast 60 and alt 68 so not much of a change seen. na 143 and k 4.6 and cl 103 and co2 23 and cr 0.93. glu 128 elevated and mentioned to him.  defer to local md re your sugars. cr 0.93.  wbc 4.1 hg 14.5 and plat 138 (down from 165) and mcv 89.    Jan 23 mri: Morphologic changes of chronic liver disease without hepatocellular carcinoma seen so that is good to note.   Patent portal venous system with stigmata portal hypertension, including upper abdominal varices and splenomegaly noted so need to stay on top of egd surveillance.   Right renal cyst with area of complexity on prior ultrasound demonstrates no septation, enhancement, or nodularity on this examination, compatible with a simple cyst ( 4.7 x 6.2 x 7.8 cm.) This may have represented artifact on prior ultrasound. Continued attention on follow-up for liver disease was recommended. Liver showed no fat or iron.  Scattered simple cysts in the left hepatic lobe. Periesophageal, perigastric, perisplenic varices seen. Mild intrahepatic duct dilatation peripherally extending to the capsule, greater in the right hepatic lobe, compatible with primary biliary cirrhosis. No extrahepatic biliary ductal dilatation. Normal gallbladder. Spleen was enlarged measuring 13.1 cm in the craniocaudal dimension. Adjacent splenule. Pancreas normal. Mild atherosclerotic disease abdominal aorta without aneurysm. Mild degenerative changes of the spine.   Document Type:	MRI Abdomen w/ + w/o Contrast Document Date:	January 23, 2021 09:35  Document Status:	Auth (Verified) Document Title:	MRI Abdomen w/ + w/o Contrast Performed By:	Jason Kapadia  Verified By:	Jason Kapadia on January 25, 2021 07:35  Encounter info:	[id number], [facility], Single Visit OP, 1/23/2021 - 1/23/2021   * Final Report *  Reason For Exam PBC//HEPATIC FIBROSIS//DIABETES//FATTY LIVER//RENAL CYST//ELEVATED ALKALINE PHOSPHATASE LEVEL  REPORT EXAM: MRI Abdomen w/ + w/o Contrast  CLINICAL INDICATION: PBC//HEPATIC FIBROSIS//DIABETES//FATTY LIVER//RENAL CYST//ELEVATED ALKALINE PHOSPHATASE LEVEL.   TECHNIQUE: Multisequence, multiplanar MRI of the abdomen was performed without and with intravenous contrast. ESRC.2.7.3  CONTRAST: 16 cc of Prohance  COMPARISON: Outside MRI dated 9/1/2020. Abdominal ultrasound dated 8/10/2020.  FINDINGS:  Lower Thorax: Normal.  Liver: No fat or iron. Morphologic changes of chronic liver disease, including lobar redistribution and nodular contour. Delayed reticular enhancement of the hepatic parenchyma, compatible with fibrosis. More confluent fibrosis in the right hepatic lobe. Scattered simple cysts in the left hepatic lobe. No hepatocellular carcinoma. Hepatic vasculature is patent. Recannulized paraumbilical vein. Periesophageal, perigastric, perisplenic varices.   Gallbladder/Biliary Tree: Mild intrahepatic duct dilatation peripherally extending to the capsule, greater in the right hepatic lobe, compatible primary biliary cirrhosis. No extrahepatic biliary ductal dilatation. Normal gallbladder.  Spleen: Enlarged measuring 13.1 cm in the craniocaudal dimension. Adjacent splenule.  Pancreas: Normal.  Adrenal Glands: Normal.   Kidneys/Ureters: Renal cysts. Right renal cyst with area of complexity on prior ultrasound demonstrates no septation, enhancement, or nodularity on this examination and measures 4.7 x 6.2 x 7.8 cm.  Gastrointestinal: No bowel obstruction.   Lymph Nodes: Normal.  Vessels: Mild atherosclerotic disease abdominal aorta without aneurysm.  Peritoneum/Retroperitoneum: Normal.  Bones/Soft Tissues: Mild degenerative changes of the spine.  IMPRESSION:     1.  Morphologic changes of chronic liver disease without hepatocellular carcinoma. 2.  Patent portal venous system with stigmata portal hypertension, including upper abdominal varices and splenomegaly. 3.  Right renal cyst with area of complexity on prior ultrasound demonstrates no septation, enhancement, or nodularity on this examination, compatible with a simple cyst. This may have represented artifact on prior ultrasound. Continued attention on follow-up for liver disease.     Signature Line *** Final ***  Electronically Signed By:  Jason Kapadia on  01/25/2021 07:35  Dictated by:  Jason Kapadia   2 weeks off ocaliva:  Sanju 15: glu 117 elevated some but similar to nov 117 but down from 204 in dec 30. bun 16 and cr 0.91. na 141 and k 4.7 and cl 102 and ca 10.1 and alb 4.2 and tb 0.9 down from 1.1 in nov. ast 60 and alt 68 and so about the same as you can see vs other two labs and alk 392 about the same vs dec but little up from nov 354. wbc 4.1 hg 14.8 plat 165. inr 1.1 stable. So no dramatic changes seen yet that.  Dec 30 labs in middle: wbc 3.9 hg 15.1 plat 167. mcv 97.  tb 0.9 down from 1.1 and alk 399 slightly higher from 354. ast 64 and alt 66 and prior ast 63 and alt 60. na 139 and k 4.7 and cl 99 and co2 27.  glu 204 elevated so that is newer issue as prior 117 and 134 so show to local md. bun 15 and cr 1.07 little up and prior 0.85 so little dry and could be linked to the sugars.   Prior Liver bx showed bridging fibrosis. He is on urosodiol 300mg BID and has been on for years Ocaliva 10mg q other day due to pruritis  Nov 25: wbc 4.2 hg 15 and plat 150 and mcv 90.  Neutrophils 1.3 and prior 1.4 and lymphs 2.0 and prior 1.9. tb 1.1 and alk 354 and ast 63 and alt 60 and prior tb 0.9 and alk 343 and ast 55 and alt 61.  So about the same. glu 117 and down from 134. bun 15 and cr 0.85 and na 139 and k 4.4 and cl 101 and co2 25. alb 4.2 normal.   Saw local mri: 9-1 20: nonenhancing renal cyst and no definite renal mass. Largest cyst right kidney 5.7cm.  Not surprisingly they thought liver appeared to be cirrhotic. Also apparent nonenhancing cysts in the liver up to 10.6mm. Nonspecific biliary dilation in liver. Extrahepatic duct appears decompressed. Nonspecific splenomegaly seen and no significant varices seen. Left adrenal suspected adenoma. Appendix seemed somewhat prominent to then at 6.4mm but was probably similar to prior per them. No definite gallstones.  Prior [place] imaging liver coarsened. They recommended mri to better see possible complex renal cyst.  Saw urologist re the kidney issue was stable. They resee him again in feb 2021.  The patient relates no significant family or personal history of liver disease. He states no history of new medications or alcohol use. The patient reports a personal history of no other habits that could cause liver damage.   Approved ocaliva through april 2021.  July 2020: tsh very low and show local md.  T4 normal 8.5.  inr 1.1 and tb 0.9 and alk 343 and ast 55 and alt 61 and tb 0.9 and alb 4.2 and ca 10.6 elevated and show local md also. na 144 and k 4.8. glu 134 elevated and maybe not fasting. cr 0.83. wbc 4.1 hg 15.3 plat 170.    Prior April ast 67 and alt 66 and alk 362 and tb 0.9 so liver labs little lower this last time despite the low dose and alk little lower also. calcium prior 10.1 and is not on any calcium supplements.   He says local doctor following thyroid and says he is doing better.   4- wbc 4.0 and hg 14.3 and plat 155 and neutrophils 1.2 little low. glu 130 and cr 0.78 and na 140 and k 4.4 and cl 101 and co2 22 and alb 4.3 and tb 0.9 and alk 362 and  ast 67 and alt 66 and inr 1.1.  2-17-20 and wbc 4.1 hg 14.5 plat 153 and neutrophils 1.3 and glu 143 and cr 0.96 and na 140 and k 4.3 and cl 100 and co2 25 and alb 4.0 and tb 0.8 and alk 352 and ast 66 and alt 61 and inr 1.0.  12-16-19 and wbc 3.6 and hg 14.3 and plat 184 and neutrophils 1.3 and glu 124 and cr 0.86 and na 142 and k 5.0 and cl 102 and cl2 25 and alb 4.3 and tb 0.9 and alk 353 and ast 70 and alt 67 and inr 1.0.  Document Type:	US Abdomen Doppler Complete Document Date:	August 10, 2020 10:04  Document Status:	Auth (Verified) Document Title:	US Abdomen Doppler Complete Performed By:	Jack Martin  Verified By:	Candelaria Rodriguez on August 10, 2020 11:41  Encounter info:	[id number], [facility], Single Visit OP, 8/10/2020 -    * Final Report *  Reason For Exam primary billary cholangitis  REPORT EXAM: US Abdomen Doppler Complete, US Abdomen Complete  CLINICAL INDICATION: Primary billary cholangitis.  TECHNIQUE: Grayscale, pulsed wave and color Doppler sonography of the upper abdomen were performed. ESRC.[id number]  COMPARISON: None  FINDINGS:  Liver: Coarsened echotexture. No lesions.  Bile Ducts: No dilated intrahepatic biliary radicles. Common duct measures 4 mm.  Gallbladder: Normal. Wick's sign is negative.  Pancreas: Partially obscured by overlying structures.  Right Kidney: Measures 11.4 cm. Normal echogenicity. No hydronephrosis. 6.4 x 5.9 x 5.3 cm inferior pole cystic lesion with internal thickened septation versus areas of nodularity, indeterminate. Hyperechoic nonshadowing cortical focus measuring 0.5 x 0.4 x 0.2 cm.. Nonshadowing hyperechoic focus near the corticomedullary junction measuring 1.1 x 0.8 x 0.6 cm.  Left Kidney: Measures 10.6 cm. Normal echogenicity. No hydronephrosis.  Spleen: Measures 12.2 cm.  Aorta: Normal caliber where imaged.  IVC: Normal proximally, not imaged distally.   Hepatic Veins: Normal.  Portal Veins: Hepatopetal flow. Velocity of 27 cm/s in the main portal vein, 25 cm/s in the right portal vein, and 20 cm/s in the left portal vein.  Hepatic Arteries: Peak systolic velocity 115 cm/s. Resistive index 0.77.  Other: None.  IMPRESSION: 1. Complex cystic lesion within the right kidney with thickened septation/areas of nodularity. Recommend MRI for further evaluation to exclude enhancing/solid components. Nonshadowing echogenic foci in the right kidney may represent nonobstructing stones although underlying lesions cannot be excluded. This can be evaluated at the time of MRI. 2. Coarsened hepatic echotexture can be seen with hepatic steatosis or chronic liver disease. No intra or extrahepatic biliary ductal dilation. Patent hepatic vasculature.  The images were reviewed and interpreted by Candelaria Rodriguez MD.  Signature Line *** Final ***  Electronically Signed By:  Candelaria Rodriguez on  08/10/2020 11:41  Dictated by:  Jack Martin  12- u/s coarse hepatic ehcotexure and consistent with cirrhosis and 1.2cm hepatic cyst. Patent vessels and right renal cysts  up to 5.7cm and some nonobstructing right renal calculi. Spleen 12.3cm.   Oct 14 2019 wbc 3.8 and hg 14.4 plat 177 and neutrophils 1.3 little low and glu 123 and cr 0.74 and na 141 and k 4.5 and cl 100 and co2 25 and alb 4.3 and tb 0.9 and alk 336 ast 67 and alt 61.  hep b immune 40.5  Sept 18 2019 na 140 and k 4.7 and cl 103 and glu 121 and bun 12 and cr 0.79 alb 3,8 and tb 0,9 and ca 9.6 and ast 49 and alt 48 and alk 334 and a1c 5.7 and chol 228 and trg 52 and hdl 86 and ldl 132 nd psa 0.01 and wbc 4.2 and hg 144 and plat 183.   June 19 2019 and liver midly coarse and 1cm hepatic cyst and stable and gb not distended and no stones and bile ducts not dilated and spleen stable 12.3cm abd right kdiney 11cm and several right kidney cysts up to 6.2cm.  Saw stone sin the right kidneuy. No hydronephrosios. Vessels patent.  No change vs 2018.   Dec 2018 u.s with liver appearing fatty and patent vessels. Right kidney cyst 6.1x4.7x5.3cm stable abd simple.  Liver cyst 1.1x0.8x1.1.cm. Similar to prior scan.  April 2019 labs wbc 4.3 hg 14.7 plat 183 and glu 134 and cr 0.85 and na 141 k 5.1 and tb 1.0 and alk 446 and ast 85 and alt 91.  Feb 2019 alk 418 and ast 79 and alt 81.  Dec 2018 alk 440 and tb 0.9 and db 0.48 and ast 76 and alt 92. Liver 76% and bone 20% and intestine 4%.  11/26/2018: wbc 4.6, hgb 14.7, plts 175,000, gluc 130, vet 0.89, na 142, k 4.0, alb 4.4, frances 2.9, t.bili 1.0, , AST 75, ASLT 80,INR 1.0, TSH 2.26  9/04/2018: wbc 4.6, hgb 14.4 plts 166,000, gluc 130, creat 0.95, na 143, k 4.5, alb 4.3, frances 3.1, t.bili 0.8, , AST56, ALT 62, INR 1.0, TSH 2.17,  6/05/2018: HBsAb 4.5, HBsAg neg, HBcAb neg, HAV  pos  6/20/2018: liver echogenic suggesting mild fatty infiltration, simple cyst 1.1 x 0.7 x 1.3,, gallbladder unremarkable, mpv  patent, cbd 4mm, right kidney simple cyst 5.6 x 4.8 x 5.2 cm, echoegenic focus 1.3cm consider kidney stone.  U/s showed fatty liver and he was placed on a low glycemic carbohydrate and stable on this.  His cholesterol is checked by Dr. Sachin Dimas. He says cholesterol has been ok.  Recommended him again to get a bone density and [place] [facility] [place]: spine normal and t score 0.1 and z score 0.1/ femur neck -0.9 and -0.4 and femur total -1.0 and -0.9. He shared with primary md. he may do this late this year.  We prior rediscussed that he needs at least  4 hour window with welchol.  Otherwise he may not be absorbing the ocaliva and other meds also.  He is not doing any teas or herbals.  Plan: 1. Redo the labs in 1m and 2m and see then and look at the ocaliva. 2. Plan to redo the mri here in july and he will take the report and the disc taken to urology. 3. Pt will call if any issues.  4. Hoping he will settle down and not clear ocaliva role. 5. He says is eating healthily.  Stressed to pt the need for social distancing and strict handwashing and wearing a mask and to follow any other new or added CDC recommendations as this is an evolving target.  Duration of the visit was 30 min via  phone as healow and doximity video failed and doximity audio for 5m was tried but not as good for hearing us as phone so we used the phone for 25m of this telemed office visit for the remainder of the time with greater than 50% of the time spent on coordination of care and in reviewing chart with the pt.

## 2021-03-01 ENCOUNTER — LAB OUTSIDE AN ENCOUNTER (OUTPATIENT)
Dept: URBAN - METROPOLITAN AREA TELEHEALTH 2 | Facility: TELEHEALTH | Age: 81
End: 2021-03-01

## 2021-03-03 ENCOUNTER — TELEPHONE ENCOUNTER (OUTPATIENT)
Dept: URBAN - METROPOLITAN AREA CLINIC 92 | Facility: CLINIC | Age: 81
End: 2021-03-03

## 2021-03-03 LAB
A/G RATIO: 1.5
ALBUMIN: 4.5
ALKALINE PHOSPHATASE: 355
ALT (SGPT): 59
AST (SGOT): 63
BASO (ABSOLUTE): 0
BASOS: 1
BILIRUBIN, TOTAL: 0.8
BUN/CREATININE RATIO: 20
BUN: 18
CALCIUM: 10
CARBON DIOXIDE, TOTAL: 26
CHLORIDE: 100
CREATININE: 0.9
EGFR IF AFRICN AM: 94
EGFR IF NONAFRICN AM: 81
EOS (ABSOLUTE): 0.3
EOS: 7
GLOBULIN, TOTAL: 3.1
GLUCOSE: 146
HEMATOCRIT: 45.7
HEMATOLOGY COMMENTS:: (no result)
HEMOGLOBIN: 14.9
IMMATURE CELLS: (no result)
IMMATURE GRANS (ABS): 0
IMMATURE GRANULOCYTES: 0
INR: 1
LYMPHS (ABSOLUTE): 1.7
LYMPHS: 43
MCH: 29.2
MCHC: 32.6
MCV: 90
MONOCYTES(ABSOLUTE): 0.5
MONOCYTES: 12
NEUTROPHILS (ABSOLUTE): 1.5
NEUTROPHILS: 37
NRBC: (no result)
PLATELETS: 151
POTASSIUM: 5
PROTEIN, TOTAL: 7.6
PROTHROMBIN TIME: 10.9
RBC: 5.1
RDW: 12.4
SODIUM: 140
WBC: 3.9

## 2021-03-03 NOTE — HPI-OTHER HISTORIES
Dear Roel Trejo The results of your recent tests are explained below: march 2 and inr 1.0 and tb 0.8 and alk 355 and down fro, 376 and prior 392. ast 63 and alt 59 and down from 65 and 68 so little lower.  ca 10.0. na 140 and k 5.0 and glu 146 elevated. bun 18 and cr 0.9. wbc 3.9 and hg 14.9 and plat 151 and mcv 90. So this is stable and lets continue on path and see how you continue to do.

## 2021-03-09 ENCOUNTER — OFFICE VISIT (OUTPATIENT)
Dept: URBAN - METROPOLITAN AREA TELEHEALTH 2 | Facility: TELEHEALTH | Age: 81
End: 2021-03-09
Payer: MEDICARE

## 2021-03-09 DIAGNOSIS — K74.00 HEPATIC FIBROSIS: ICD-10-CM

## 2021-03-09 DIAGNOSIS — K74.3 PBC (PRIMARY BILIARY CIRRHOSIS): ICD-10-CM

## 2021-03-09 DIAGNOSIS — Z79.899 HIGH RISK MEDICATION USE: ICD-10-CM

## 2021-03-09 DIAGNOSIS — L29.9 PRURITIC CONDITION: ICD-10-CM

## 2021-03-09 PROCEDURE — 99214 OFFICE O/P EST MOD 30 MIN: CPT

## 2021-03-09 RX ORDER — GABAPENTIN 300 MG/1
1 CAPSULE CAPSULE ORAL TWICE A DAY
Status: ACTIVE | COMMUNITY

## 2021-03-09 RX ORDER — URSODIOL 500 MG/1
TAKE 1 TABLET TWICE A DAY TABLET ORAL TWICE A DAY
Qty: 180 | Refills: 1 | Status: ACTIVE | COMMUNITY

## 2021-03-09 RX ORDER — OBETICHOLIC ACID 10 MG/1
1 TABLET TABLET, FILM COATED ORAL EVERY OTHER DAY
Qty: 15 | Refills: 1

## 2021-03-09 RX ORDER — AMLODIPINE BESYLATE 5 MG/1
TAKE 1 TABLET (5 MG) BY ORAL ROUTE ONCE DAILY TABLET ORAL 1
Qty: 0 | Refills: 0 | Status: ACTIVE | COMMUNITY
Start: 1900-01-01

## 2021-03-09 RX ORDER — URSODIOL 500 MG/1
TAKE 1 TABLET TWICE A DAY TABLET ORAL TWICE A DAY
Qty: 180 | Refills: 1

## 2021-03-09 RX ORDER — GLIPIZIDE 5 MG/1
0.5 TABLET 30 MINUTES BEFORE BREAKFAST TABLET ORAL ONCE A DAY
Status: ACTIVE | COMMUNITY

## 2021-03-09 RX ORDER — OBETICHOLIC ACID 10 MG/1
1 TABLET TABLET, FILM COATED ORAL EVERY OTHER DAY
Qty: 15 | Refills: 1 | Status: ON HOLD | COMMUNITY

## 2021-03-09 NOTE — HPI-OTHER HISTORIES
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  He did the covid 19 series and 2021.  Last 4 weeks off the medicine and now almost 8 weeks.  He says he started the gabapentin and says hard to say if that is helping or not.  8 weeks off labs  and inr 1.0 and tb 0.8 and alk 355 and down from 376 and prior 392. ast 63 and alt 59 and down from 65 and 68 so little lower.  ca 10.0. na 140 and k 5.0 and glu 146 elevated. bun 18 and cr 0.9. wbc 3.9 and hg 14.9 and plat 151 and mcv 90.   So giving this appears he is stable and lets continue on path and seeing  how he does.   4 weeks off labs: 2021 and inr normal 1.0 and so little lower now. tb 0.8 and lower from 0.9 and alk 376 from 392 so lower and ast 65 and alt 68 and prior ast 60 and alt 68 so not much of a change seen. na 143 and k 4.6 and cl 103 and co2 23 and cr 0.93. glu 128 elevated and mentioned to him. defer to local md re your sugars. cr 0.93. wbc 4.1 hg 14.5 and plat 138 (down from 165) and mcv 89.   mri: Morphologic changes of chronic liver disease without  hepatocellular carcinoma seen so that is good to note. Patent portal venous system with stigmata portal hypertension, including upper abdominal varices and splenomegaly noted so need to stay on top of egd surveillance. Right renal cyst with area of complexity on prior ultrasound demonstrates no septation, enhancement, or nodularity on this examination, compatible  with a simple cyst ( 4.7 x 6.2 x 7.8 cm.) This may have represented artifact on prior ultrasound. Continued attention on follow-up for liver disease was recommended. Liver showed no fat or iron. Scattered simple cysts in the left hepatic  lobe. Periesophageal, perigastric, perisplenic varices seen. Mild intrahepatic duct dilatation peripherally extending to the capsule, greater in the right hepatic lobe, compatible with primary biliary cirrhosis. No extrahepatic biliary ductal dilatation. Normal gallbladder. Spleen was enlarged measuring 13.1 cm in the craniocaudal dimension.Adjacent splenule. Pancreas normal. Mild atherosclerotic disease abdominal aorta without aneurysm. Mild degenerative changes of the spine.  Document Type: MRI Abdomen w/ + w/o Contrast Document Date: 2021 09:35 Document Status: Auth (Verified) Document Title: MRI Abdomen w/ + w/o Contrast Performed By: Jason Kapadia Verified By: Jason Kapadia on 2021 07:35 Encounter info: [id number], [facility], Single Visit OP, 2021 - 2021 * Final Report * Reason For Exam PBC//HEPATIC FIBROSIS//DIABETES//FATTY LIVER//RENAL CYST//ELEVATED ALKALINE PHOSPHATASE LEVEL REPORT EXAM: MRI Abdomen w/ + w/o Contrast CLINICAL INDICATION: PBC//HEPATIC FIBROSIS//DIABETES//FATTY LIVER//RENAL CYST//ELEVATED ALKALINE PHOSPHATASE LEVEL. Roel GORDON : 1940 (81 yo M) Acc No. [id number] DOS: 2021 Progress Note: Haresh Longoria MD 2021 Note generated by [employer] EMR/PM Software ([email or web address]) TECHNIQUE: Multisequence, multiplanar MRI of the abdomen was performed without and with intravenous contrast. ESRC.2.7.3 CONTRAST: 16 cc of Prohance COMPARISON: Outside MRI dated 2020. Abdominal ultrasound dated 8/10/2020. FINDINGS: Lower Thorax: Normal. Liver: No fat or iron. Morphologic changes of chronic liver disease, including lobar redistribution and nodular contour. Delayed reticular enhancement of the hepatic parenchyma, compatible with fibrosis. More confluent fibrosis in the right hepatic lobe. Scattered simple cysts in the left hepatic lobe. No hepatocellular carcinoma. Hepatic vasculature is patent. Recannulized paraumbilical vein. Periesophageal, perigastric, perisplenic varices. Gallbladder/Biliary Tree: Mild intrahepatic duct dilatation peripherally extending to the capsule, greater in the right hepatic lobe, compatible primary biliary cirrhosis. No extrahepatic biliary ductal dilatation. Normal gallbladder. Spleen: Enlarged measuring 13.1 cm in the craniocaudal dimension. Adjacent splenule. Pancreas: Normal. Adrenal Glands: Normal. Kidneys/Ureters: Renal cysts. Right renal cyst with area of complexity on prior ultrasound demonstrates no septation, enhancement, or nodularity on this examination and measures 4.7 x 6.2 x 7.8 cm. Gastrointestinal: No bowel obstruction. Lymph Nodes: Normal. Vessels: Mild atherosclerotic disease abdominal aorta without aneurysm. Peritoneum/Retroperitoneum: Normal. Bones/Soft Tissues: Mild degenerative changes of the spine. IMPRESSION: 1. Morphologic changes of chronic liver disease without hepatocellular carcinoma. 2. Patent portal venous system with stigmata portal hypertension, including upper abdominal varices and splenomegaly. 3. Right renal cyst with area of complexity on prior ultrasound demonstrates no septation, enhancement, or nodularity on this examination, compatible with a simple cyst. This may have represented artifact on prior ultrasound. Continued attention on follow-up for liver disease. Signature Line *** Final *** PERSONRoel : 1940 (81 yo M) Acc No. [id number] DOS: 2021 Progress Note: Haresh Longoria MD 2021 Note generated by [employer] EMR/PM Software ([email or web address]) Electronically Signed By: Jason Kapadia on 2021 07:35 Dictated by: Jason Kapadia  2 weeks off ocaliva: Sanju 15: glu 117 elevated some but similar to nov 117 but down from 204 in dec 30. bun 16 and cr 0.91. na 141 and k 4.7 and cl 102 and ca 10.1 and alb 4.2 and tb 0.9 down from 1.1 in nov. ast 60 and alt 68 and so about the same as you can see vs other two labs and alk 392  about the same vs dec but little up from nov 354. wbc 4.1 hg 14.8 plat 165. inr 1.1 stable. So no dramatic changes seen yet that point.  Dec 30 2020 labs in middle: wbc 3.9 hg 15.1 plat 167. mcv 97. tb 0.9 down  from 1.1 and alk 399 slightly higher from 354. ast 64 and alt 66 and prior ast 63 and alt 60. na 139 and k 4.7 and cl 99 and co2 27. glu 204 elevated so that is newer issue as prior 117 and 134 so show to local md. bun 15 and cr 1.07 little up and prior 0.85 so little dry and could be linked to the sugars.  Prior Liver bx showed bridging fibrosis.   He is on urosodiol 300mg BID and has been on for years Ocaliva 10mg q other day due to pruritis.  : wbc 4.2 hg 15 and plat 150 and mcv 90. Neutrophils 1.3 and prior 1.4 and lymphs 2.0 and prior 1.9. tb 1.1 and alk 354 and ast 63 and alt  60 and prior tb 0.9 and alk 343 and ast 55 and alt 61. So about the same. glu 117 and down from 134. bun 15 and cr 0.85 and na 139 and k 4.4 and cl 101 and co2 25. alb 4.2 normal.  Saw local mri: : nonenhancing renal cyst and no definite renal mass. Largest cyst right kidney 5.7cm. Not surprisingly they thought liver appeared to be cirrhotic. Also apparent nonenhancing cysts in the liver up to 10.6mm. Nonspecific biliary dilation in liver. Extrahepatic duct appears decompressed. Nonspecific splenomegaly seen and no significant varices seen. Left adrenal suspected adenoma. Appendix seemed somewhat prominent to then at 6.4mm but was probably similar to prior per them. No definite gallstones. Prior [place] imaging liver coarsened. They recommended mri to better see possible complex renal cyst.  Saw urologist re the kidney issue was stable. They were to see him again in 2021 and had been changed.  The patient relates no significant family or personal history of liver disease. He states no history of new medications or alcohol use. The  patient reports a personal history of no other habits that could cause liver damage.  Approved ocaliva through 2021.  2020: tsh very low and show local md. T4 normal 8.5. inr 1.1 and tb 0.9 and alk 343 and ast 55 and alt 61 and tb 0.9 and alb 4.2 and ca 10.6 elevated and show local md also. na 144 and k 4.8. glu 134 elevated and maybe not fasting. cr 0.83. wbc 4.1 hg 15.3 plat 170.  Prior April ast 67 and alt 66 and alk 362 and tb 0.9 so liver labs little lower this last time despite the low dose and alk little lower also. calcium prior 10.1 and is not on any calcium supplements.  He says local doctor following thyroid and says he is doing better.  2020 wbc 4.0 and hg 14.3 and plat 155 and neutrophils 1.2 little low. glu 130 and cr 0.78 and na 140 and k 4.4 and cl 101 and co2 22 and alb 4.3 and tb 0.9 and alk 362 and ast 67 and alt 66 and inr 1.1.  2-17-20 and wbc 4.1 hg 14.5 plat 153 and neutrophils 1.3 and glu 143 and cr 0.96 and na 140 and k 4.3 and cl 100 and co2 25 and alb 4.0 and tb 0.8 and alk 352 and ast 66 and alt 61 and inr 1.0.  12-16-19 and wbc 3.6 and hg 14.3 and plat 184 and neutrophils 1.3 and glu 124 and cr 0.86 and na 142 and k 5.0 and cl 102 and cl2 25 and alb 4.3 and tb 0.9 and alk 353 and ast 70 and alt 67 and inr 1.0.  Document Type: US Abdomen Doppler Complete Document Date: August 10, 2020 10:04 Document Status: Auth (Verified) Document Title: US Abdomen Doppler Complete Performed By: Jack Martin Verified By: Candelaria Rodriguez on August 10, 2020 11:41 Encounter info: [id number], [facility], Single Visit OP, 8/10/2020 - * Final Report * Reason For Exam primary billary cholangitis REPORT EXAM: US Abdomen Doppler Complete, US Abdomen Complete CLINICAL INDICATION: Primary billary cholangitis. TECHNIQUE: Grayscale, pulsed wave and color Doppler sonography of the upper abdomen were performed. ESRC.3.7.1 COMPARISON: None FINDINGS: Liver: Coarsened echotexture. No lesions. Bile Ducts: No dilated intrahepatic biliary radicles. Common duct measures 4 mm. Gallbladder: Normal. Wick's sign is negative. Pancreas: Partially obscured by overlying structures. Right Kidney: Measures 11.4 cm. Normal echogenicity. No hydronephrosis. 6.4 x 5.9 x 5.3 cm inferior pole cystic lesion with internal thickened septation versus areas of nodularity, indeterminate. Hyperechoic nonshadowing cortical focus measuring 0.5 x 0.4 x 0.2 cm.. Nonshadowing hyperechoic focus near the corticomedullary junction measuring 1.1 x 0.8 x 0.6 cm. Left Kidney: Measures 10.6 cm. Normal echogenicity. No hydronephrosis. Spleen: Measures 12.2 cm. Aorta: Normal caliber where imaged. IVC: Normal proximally, not imaged distally. Hepatic Veins: Normal. Portal Veins: Hepatopetal flow. Velocity of 27 cm/s in the main portal vein, 25 cm/s in the right portal vein, and 20 cm/s in the left portal vein. Hepatic Arteries: Peak systolic velocity 115 cm/s. Resistive index 0.77. Other: None. IMPRESSION: 1. Complex cystic lesion within the right kidney with thickened septation/areas of nodularity. Recommend MRI for further evaluation to exclude enhancing/solid components. Nonshadowing echogenic foci in the right kidney may represent nonobstructing stones although underlying lesions cannot be excluded. This can Roel GORDON : 1940 (81 yo M) Acc No. [id number] DOS: 2021 Progress Note: Haresh Longoria MD 2021 Note generated by [employer] EMR/PM Software ([email or web address]) be evaluated at the time of MRI. 2. Coarsened hepatic echotexture can be seen with hepatic steatosis or chronic liver disease. No intra or extrahepatic biliary ductal dilation. Patent hepatic vasculature. The images were reviewed and interpreted by Candelaria Rodriguez MD. Signature Line *** Final *** Electronically Signed By: Candelaria Rodriguez on 08/10/2020 11:41 Dictated by: Jack Martin  2019 u/s coarse hepatic ehcotexure and consistent with cirrhosis and 1.2cm hepatic cyst. Patent vessels and right renal cysts up to 5.7cm and some nonobstructing right renal calculi. Spleen 12.3cm.  Oct 14 2019 wbc 3.8 and hg 14.4 plat 177 and neutrophils 1.3 little low and glu 123 and cr 0.74 and na 141 and k 4.5 and cl 100 and co2 25 and alb 4.3 and tb 0.9 and alk 336 ast 67 and alt 61. hep b immune 40.5  2019 na 140 and k 4.7 and cl 103 and glu 121 and bun 12 and cr 0.79 alb 3,8 and tb 0,9 and ca 9.6 and ast 49 and alt 48 and alk 334 and a1c 5.7 and chol 228 and trg 52 and hdl 86 and ldl 132 nd psa 0.01 and wbc 4.2 and hg 144 and plat 183.  2019 and liver midly coarse and 1cm hepatic cyst and stable and gb not distended and no stones and bile ducts not dilated and spleen stable 12.3cm abd right kdiney 11cm and several right kidney cysts up to 6.2cm. Saw stone sin the right kidneuy. No hydronephrosios. Vessels patent. No change vs 2018.  Dec 2018 u.s with liver appearing fatty and patent vessels. Right kidney cyst 6.1x4.7x5.3cm stable abd simple. Liver cyst 1.1x0.8x1.1.cm. Similar to prior scan.  2019 labs wbc 4.3 hg 14.7 plat 183 and glu 134 and cr 0.85 and na 141 k 5.1 and tb 1.0 and alk 446 and ast 85 and alt 91.  2019 alk 418 and ast 79 and alt 81.  Dec 2018 alk 440 and tb 0.9 and db 0.48 and ast 76 and alt 92. Liver 76% and bone 20% and intestine 4%.  2018: wbc 4.6, hgb 14.7, plts 175,000, gluc 130, vet 0.89, na 142, k 4.0, alb 4.4, frances 2.9, t.bili 1.0, , AST 75, ASLT 80,INR 1.0, TSH 2.26  2018: wbc 4.6, hgb 14.4 plts 166,000, gluc 130, creat 0.95, na 143, k 4.5, alb 4.3, frances 3.1, t.bili 0.8, , AST56, ALT 62, INR 1.0, TSH 2.17,  2018: HBsAb 4.5, HBsAg neg, HBcAb neg, HAV pos  2018: liver echogenic suggesting mild fatty infiltration, simple cyst 1.1 x 0.7 x 1.3,, gallbladder unremarkable, mpv patent, cbd 4mm, right kidney simple cyst 5.6 x 4.8 x 5.2 cm, echoegenic focus 1.3cm consider kidney stone.  U/s showed fatty liver and he was placed on a low glycemic carbohydrate and stable on this.  His cholesterol is checked by Dr. Sachin Dimas. He says cholesterol has been ok.   Recommended him again to get a bone density and he did this at [facility]:  19: spine normal and t score 0.1 and z score 0.1/ femur neck -0.9 and -0.4 and femur total -1.0 and -0.9. He shared with primary md. he may dothis late this year.  We prior rediscussed that he needs at least 4 hour window with welchol. Otherwise he may not be absorbing the ocaliva and other meds also.  He is not doing any teas or herbals.  Due to weight max dose 1250mg is max dose.  Plan: 1. Redo the labs in 6 and 12 weeks and if not better then try to go up on the ursodiol 250mg   tid. 2.  Stay off the ocaliva for now.  3. Curiously labs lower off it.  4. Redo the redo the mri here in july and he will take the report and the disc taken to urology. 5. Pt will call if any issues. 6. Hoping he will settle down and stay off the the ocaliva role. 7. He says is eating healthily.  Stressed to pt the need for social distancing and strict handwashing and wearing a mask and to follow any other new or added CDC recommendations as this is an evolving target.  Duration of the visit was 30  min with 6 min of prep and chart review and then 24 by paul from 11:30 to 1154  via paul today  with healow clock off due to fluxing for the visit  with greater than 50% of the time spent on coordination of care and in reviewing chart with the pt.

## 2021-04-27 ENCOUNTER — LAB OUTSIDE AN ENCOUNTER (OUTPATIENT)
Dept: URBAN - METROPOLITAN AREA TELEHEALTH 2 | Facility: TELEHEALTH | Age: 81
End: 2021-04-27

## 2021-04-28 ENCOUNTER — TELEPHONE ENCOUNTER (OUTPATIENT)
Dept: URBAN - METROPOLITAN AREA CLINIC 92 | Facility: CLINIC | Age: 81
End: 2021-04-28

## 2021-04-28 LAB
ALBUMIN: 4.3
ALKALINE PHOSPHATASE: 298
ALT (SGPT): 60
AST (SGOT): 60
BILIRUBIN, DIRECT: 0.43
BILIRUBIN, TOTAL: 1.1
PROTEIN, TOTAL: 7.1

## 2021-04-28 NOTE — HPI-TODAY'S VISIT:
Dear Roel Trejo, April 27: alb 4.3 ad tb 1.1 and db 0.43 and alk 298 and ast 60 and alt 60. March 2: alk 355 and ast 63 and alt 59 so in fact alk phos lower now to 298 and ast and alt about the same. So you remain stable. Dr Longoria

## 2021-06-07 ENCOUNTER — LAB OUTSIDE AN ENCOUNTER (OUTPATIENT)
Dept: URBAN - METROPOLITAN AREA TELEHEALTH 2 | Facility: TELEHEALTH | Age: 81
End: 2021-06-07

## 2021-06-10 ENCOUNTER — TELEPHONE ENCOUNTER (OUTPATIENT)
Dept: URBAN - METROPOLITAN AREA CLINIC 92 | Facility: CLINIC | Age: 81
End: 2021-06-10

## 2021-06-10 LAB
A/G RATIO: 1.4
ALBUMIN: 4.3
ALKALINE PHOSPHATASE: 309
ALT (SGPT): 51
AST (SGOT): 55
BASO (ABSOLUTE): 0
BASOS: 1
BILIRUBIN, TOTAL: 0.7
BUN/CREATININE RATIO: 18
BUN: 17
CALCIUM: 10.1
CARBON DIOXIDE, TOTAL: 26
CHLORIDE: 102
CREATININE: 0.93
EGFR IF AFRICN AM: 89
EGFR IF NONAFRICN AM: 77
EOS (ABSOLUTE): 0.3
EOS: 6
GLOBULIN, TOTAL: 3
GLUCOSE: 129
HEMATOCRIT: 45.4
HEMATOLOGY COMMENTS:: (no result)
HEMOGLOBIN: 14.8
IMMATURE CELLS: (no result)
IMMATURE GRANS (ABS): 0
IMMATURE GRANULOCYTES: 0
INR: 1.1
LYMPHS (ABSOLUTE): 2
LYMPHS: 45
MCH: 29.6
MCHC: 32.6
MCV: 91
MONOCYTES(ABSOLUTE): 0.5
MONOCYTES: 12
NEUTROPHILS (ABSOLUTE): 1.6
NEUTROPHILS: 36
NRBC: (no result)
PLATELETS: 162
POTASSIUM: 4.6
PROTEIN, TOTAL: 7.3
PROTHROMBIN TIME: 11.2
RBC: 5
RDW: 12.8
SODIUM: 139
WBC: 4.3

## 2021-06-10 NOTE — HPI-TODAY'S VISIT:
June 9 laboratories show white blood cell count 4.3 hemoglobin 13.8 platelet 162.  These are stable for you.  Platelet count is actually better than it was before at 151.  Neutrophils are stable at 1.6 and previously were 1.5.  Total bilirubin is down to 0.7 from 0.8.  Alkaline phosphatase is lower at 309 from 355.  AST down to 55 from 63 ALT 51 down from 59 and prior to that 68.  So the liver labs continue to be dropping.  Sodium 139 potassium 4.6 chloride 102 CO2 26 BUN 17 creatinine 0.93 glucose 129.  Sugar is actually lower than the previous time at 146. INR 1.1. Meld score low at 7 and meld na 7. Dr Longoria

## 2021-06-15 ENCOUNTER — OFFICE VISIT (OUTPATIENT)
Dept: URBAN - METROPOLITAN AREA TELEHEALTH 2 | Facility: TELEHEALTH | Age: 81
End: 2021-06-15
Payer: MEDICARE

## 2021-06-15 DIAGNOSIS — Z79.899 HIGH RISK MEDICATION USE: ICD-10-CM

## 2021-06-15 DIAGNOSIS — K74.3 PBC (PRIMARY BILIARY CIRRHOSIS): ICD-10-CM

## 2021-06-15 DIAGNOSIS — K76.6 PORTAL HYPERTENSION: ICD-10-CM

## 2021-06-15 DIAGNOSIS — K74.0 HEPATIC FIBROSIS: ICD-10-CM

## 2021-06-15 PROBLEM — 62484002: Status: ACTIVE | Noted: 2020-08-10

## 2021-06-15 PROCEDURE — 99214 OFFICE O/P EST MOD 30 MIN: CPT

## 2021-06-15 RX ORDER — GLIPIZIDE 5 MG/1
0.5 TABLET 30 MINUTES BEFORE BREAKFAST TABLET ORAL ONCE A DAY
Status: ACTIVE | COMMUNITY

## 2021-06-15 RX ORDER — URSODIOL 500 MG/1
TAKE 1 TABLET TWICE A DAY TABLET ORAL TWICE A DAY
Qty: 180 | Refills: 1

## 2021-06-15 RX ORDER — GABAPENTIN 300 MG/1
1 CAPSULE CAPSULE ORAL TWICE A DAY
Status: ACTIVE | COMMUNITY

## 2021-06-15 RX ORDER — AMLODIPINE BESYLATE 5 MG/1
TAKE 1 TABLET (5 MG) BY ORAL ROUTE ONCE DAILY TABLET ORAL 1
Qty: 0 | Refills: 0 | Status: ACTIVE | COMMUNITY
Start: 1900-01-01

## 2021-06-15 RX ORDER — OBETICHOLIC ACID 10 MG/1
1 TABLET TABLET, FILM COATED ORAL EVERY OTHER DAY
Qty: 15 | Refills: 1 | Status: DISCONTINUED | COMMUNITY

## 2021-06-15 RX ORDER — URSODIOL 500 MG/1
TAKE 1 TABLET TWICE A DAY TABLET ORAL TWICE A DAY
Qty: 180 | Refills: 1 | Status: ACTIVE | COMMUNITY

## 2021-06-15 NOTE — HPI-OTHER HISTORIES
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  He did the covid 19 series and March 5 2021.  Meds off ocaliva for 5 m.  He says he started the gabapentin and says hard to say if that is helping or not.  June 9 laboratories show white blood cell count 4.3 hemoglobin 13.8 platelet 162.  These are stable for you.  Platelet count is actually better than it was before at 151.  Neutrophils are stable at 1.6 and previously were 1.5.  Total bilirubin is down to 0.7 from 0.8.  Alkaline phosphatase is lower at 309 from 355.  AST down to 55 from 63 ALT 51 down from 59 and prior to that 68.  So the liver labs continue to be dropping.  Sodium 139 potassium 4.6 chloride 102 CO2 26 BUN 17 creatinine 0.93 glucose 129.  Sugar is actually lower than the previous time at 146. INR 1.1.   Meld score low at 7 and meld na 7.  April 27: alb 4.3 and tb 1.1 and db 0.43 and alk 298 and ast 60 and alt 60.  March 2: alk 355 and ast 63 and alt 59 so in fact alk phos lower now to 298 and ast and alt about the same.  8 weeks off labs March 2 and inr 1.0 and tb 0.8 and alk 355 and down from 376 and prior 392. ast 63 and alt 59 and down from 65 and 68 so little lower.  ca 10.0. na 140 and k 5.0 and glu 146 elevated. bun 18 and cr 0.9. wbc 3.9 and hg 14.9 and plat 151 and mcv 90.   4 weeks off labs: jan 25 2021 and inr normal 1.0 and so little lower now. tb 0.8 and lower from 0.9 and alk 376 from 392 so lower and ast 65 and alt 68 and prior ast 60 and alt 68 so not much of a change seen. na 143 and k 4.6 and cl 103 and co2 23 and cr 0.93. glu 128 elevated and mentioned to him. defer to local md re your sugars. cr 0.93. wbc 4.1 hg 14.5 and plat 138 (down from 165) and mcv 89.  Jan 23 mri: Morphologic changes of chronic liver disease without  hepatocellular carcinoma seen so that is good to note. Patent portal venous system with stigmata portal hypertension, including upper abdominal varices and splenomegaly noted so need to stay on top of egd surveillance. Right renal cyst with area of complexity on prior ultrasound demonstrates no septation, enhancement, or nodularity on this examination, compatible  with a simple cyst ( 4.7 x 6.2 x 7.8 cm.) This may have represented artifact on prior ultrasound. Continued attention on follow-up for liver disease was recommended. Liver showed no fat or iron. Scattered simple cysts in the left hepatic  lobe. Periesophageal, perigastric, perisplenic varices seen. Mild intrahepatic duct dilatation peripherally extending to the capsule, greater in the right hepatic lobe, compatible with primary biliary cirrhosis. No extrahepatic biliary ductal dilatation. Normal gallbladder. Spleen was enlarged measuring 13.1 cm in the craniocaudal dimension.Adjacent splenule. Pancreas normal. Mild atherosclerotic disease abdominal aorta without aneurysm. Mild degenerative changes of the spine.  Due for mri july 2021.  Document Type: MRI Abdomen w/ + w/o Contrast Document Date: January 23, 2021 09:35 Document Status: Auth (Verified) Document Title: MRI Abdomen w/ + w/o Contrast Performed By: Jason Kapadia Verified By: Jason Kapadia on January 25, 2021 07:35 Encounter info: [id number], [facility], Single Visit OP, 1/23/2021 - 1/23/2021 * Final Report * Reason For Exam PBC//HEPATIC FIBROSIS//DIABETES//FATTY LIVER//RENAL CYST//ELEVATED ALKALINE PHOSPHATASE LEVEL REPORT EXAM: MRI Abdomen w/ + w/o Contrast CLINICAL INDICATION: PBC//HEPATIC FIBROSIS//DIABETES//FATTY LIVER//RENAL CYST//ELEVATED ALKALINE PHOSPHATASE LEVEL. TECHNIQUE: Multisequence, multiplanar MRI of the abdomen was performed without and with intravenous contrast. ESRC.2.7.3 CONTRAST: 16 cc of Prohance COMPARISON: Outside MRI dated 9/1/2020. Abdominal ultrasound dated 8/10/2020. FINDINGS: Lower Thorax: Normal. Liver: No fat or iron. Morphologic changes of chronic liver disease, including lobar redistribution and nodular contour. Delayed reticular enhancement of the hepatic parenchyma, compatible with fibrosis. More confluent fibrosis in the right hepatic lobe. Scattered simple cysts in the left hepatic lobe. No hepatocellular carcinoma. Hepatic vasculature is patent. Recannulized paraumbilical vein. Periesophageal, perigastric, perisplenic varices. Gallbladder/Biliary Tree: Mild intrahepatic duct dilatation peripherally extending to the capsule, greater in the right hepatic lobe, compatible primary biliary cirrhosis. No extrahepatic biliary ductal dilatation. Normal gallbladder. Spleen: Enlarged measuring 13.1 cm in the craniocaudal dimension. Adjacent splenule. Pancreas: Normal. Adrenal Glands: Normal. Kidneys/Ureters: Renal cysts. Right renal cyst with area of complexity on prior ultrasound demonstrates no septation, enhancement, or nodularity on this examination and measures 4.7 x 6.2 x 7.8 cm. Gastrointestinal: No bowel obstruction. Lymph Nodes: Normal. Vessels: Mild atherosclerotic disease abdominal aorta without aneurysm. Peritoneum/Retroperitoneum: Normal. Bones/Soft Tissues: Mild degenerative changes of the spine. IMPRESSION: 1. Morphologic changes of chronic liver disease without hepatocellular carcinoma. 2. Patent portal venous system with stigmata portal hypertension, including upper abdominal varices and splenomegaly. 3. Right renal cyst with area of complexity on prior ultrasound demonstrates no septation, enhancement, or nodularity on this examination, compatible with a simple cyst. This may have represented artifact on prior ultrasound. Continued attention on follow-up for liver disease. Signature Line *** Final ***  Electronically Signed By: Jason Kapadia on 01/25/2021 07:35 Dictated by: Jason Kapadia  2 weeks off ocaliva: Sanju 15: glu 117 elevated some but similar to nov 117 but down from 204 in dec 30. bun 16 and cr 0.91. na 141 and k 4.7 and cl 102 and ca 10.1 and alb 4.2 and tb 0.9 down from 1.1 in nov. ast 60 and alt 68 and so about the same as you can see vs other two labs and alk 392  about the same vs dec but little up from nov 354. wbc 4.1 hg 14.8 plat 165. inr 1.1 stable. So no dramatic changes seen yet that point.  Dec 30 2020 labs in middle: wbc 3.9 hg 15.1 plat 167. mcv 97. tb 0.9 down  from 1.1 and alk 399 slightly higher from 354. ast 64 and alt 66 and prior ast 63 and alt 60. na 139 and k 4.7 and cl 99 and co2 27. glu 204 elevated so that is newer issue as prior 117 and 134 so show to local md. bun 15 and cr 1.07 little up and prior 0.85 so little dry and could be linked to the sugars.  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  He is on urosodiol 300mg BID and had been on for years Ocaliva 10mg q other day due to pruritis and then off due to pruritis.  Itching much better on the gabapentin and off the ocaliva.  Nov 25: wbc 4.2 hg 15 and plat 150 and mcv 90. Neutrophils 1.3 and prior 1.4 and lymphs 2.0 and prior 1.9. tb 1.1 and alk 354 and ast 63 and alt  60 and prior tb 0.9 and alk 343 and ast 55 and alt 61. So about the same. glu 117 and down from 134. bun 15 and cr 0.85 and na 139 and k 4.4 and cl 101 and co2 25. alb 4.2 normal.  Saw local mri: 9-1 20: nonenhancing renal cyst and no definite renal mass. Largest cyst right kidney 5.7cm. Not surprisingly they thought liver appeared to be cirrhotic. Also apparent nonenhancing cysts in the liver up to 10.6mm. Nonspecific biliary dilation in liver. Extrahepatic duct appears decompressed. Nonspecific splenomegaly seen and no significant varices seen. Left adrenal suspected adenoma. Appendix seemed somewhat prominent to then at 6.4mm but was probably similar to prior per them. No definite gallstones. Prior [place] imaging liver coarsened. They recommended mri to better see possible complex renal cyst.  Saw urologist re the kidney issue was stable. They were to see him again in feb 2021 and had been changed.  The patient relates no significant family or personal history of liver disease. He states no history of new medications or alcohol use. The  patient reports a personal history of no other habits that could cause liver damage.  July 2020: tsh very low and show local md. T4 normal 8.5. inr 1.1 and tb 0.9 and alk 343 and ast 55 and alt 61 and tb 0.9 and alb 4.2 and ca 10.6 elevated and show local md also. na 144 and k 4.8. glu 134 elevated and maybe not fasting. cr 0.83. wbc 4.1 hg 15.3 plat 170.  Prior April ast 67 and alt 66 and alk 362 and tb 0.9 so liver labs little lower this last time despite the low dose and alk little lower also. calcium prior 10.1 and is not on any calcium supplements.  He says local doctor following thyroid and says he is doing better.  4- wbc 4.0 and hg 14.3 and plat 155 and neutrophils 1.2 little low. glu 130 and cr 0.78 and na 140 and k 4.4 and cl 101 and co2 22 and alb 4.3 and tb 0.9 and alk 362 and ast 67 and alt 66 and inr 1.1.  2-17-20 and wbc 4.1 hg 14.5 plat 153 and neutrophils 1.3 and glu 143 and cr 0.96 and na 140 and k 4.3 and cl 100 and co2 25 and alb 4.0 and tb 0.8 and alk 352 and ast 66 and alt 61 and inr 1.0.  12-16-19 and wbc 3.6 and hg 14.3 and plat 184 and neutrophils 1.3 and glu 124 and cr 0.86 and na 142 and k 5.0 and cl 102 and cl2 25 and alb 4.3 and tb 0.9 and alk 353 and ast 70 and alt 67 and inr 1.0.  Document Type: US Abdomen Doppler Complete Document Date: August 10, 2020 10:04 Document Status: Auth (Verified) Document Title: US Abdomen Doppler Complete Performed By: Jack Martin Verified By: Candelaria Rodriguez on August 10, 2020 11:41 Encounter info: [id number], [facility], Single Visit OP, 8/10/2020 - * Final Report * Reason For Exam primary billary cholangitis REPORT EXAM: US Abdomen Doppler Complete, US Abdomen Complete CLINICAL INDICATION: Primary billary cholangitis. TECHNIQUE: Grayscale, pulsed wave and color Doppler sonography of the upper abdomen were performed. ESRC.3.7.1 COMPARISON: None FINDINGS: Liver: Coarsened echotexture. No lesions. Bile Ducts: No dilated intrahepatic biliary radicles. Common duct measures 4 mm. Gallbladder: Normal. Wick's sign is negative. Pancreas: Partially obscured by overlying structures. Right Kidney: Measures 11.4 cm. Normal echogenicity. No hydronephrosis. 6.4 x 5.9 x 5.3 cm inferior pole cystic lesion with internal thickened septation versus areas of nodularity, indeterminate. Hyperechoic nonshadowing cortical focus measuring 0.5 x 0.4 x 0.2 cm.. Nonshadowing hyperechoic focus near the corticomedullary junction measuring 1.1 x 0.8 x 0.6 cm. Left Kidney: Measures 10.6 cm. Normal echogenicity. No hydronephrosis. Spleen: Measures 12.2 cm. Aorta: Normal caliber where imaged. IVC: Normal proximally, not imaged distally. Hepatic Veins: Normal. Portal Veins: Hepatopetal flow. Velocity of 27 cm/s in the main portal vein, 25 cm/s in the right portal vein, and 20 cm/s in the left portal vein. Hepatic Arteries: Peak systolic velocity 115 cm/s. Resistive index 0.77. Other: None. IMPRESSION: 1. Complex cystic lesion within the right kidney with thickened septation/areas of nodularity. Recommend MRI for further evaluation to exclude enhancing/solid components. Nonshadowing echogenic foci in the right kidney may represent nonobstructing stones although underlying lesions cannot be excluded. This can be evaluated at the time of MRI. 2. Coarsened hepatic echotexture can be seen with hepatic steatosis or chronic liver disease. No intra or extrahepatic biliary ductal dilation. Patent hepatic vasculature. The images were reviewed and interpreted by Candelaria Rodriguez MD. Signature Line *** Final *** Electronically Signed By: Candelaria Rodriguez on 08/10/2020 11:41 Dictated by: Jack Martin  12- u/s coarse hepatic ehcotexure and consistent with cirrhosis and 1.2cm hepatic cyst. Patent vessels and right renal cysts up to 5.7cm and some nonobstructing right renal calculi. Spleen 12.3cm.  Oct 14 2019 wbc 3.8 and hg 14.4 plat 177 and neutrophils 1.3 little low and glu 123 and cr 0.74 and na 141 and k 4.5 and cl 100 and co2 25 and alb 4.3 and tb 0.9 and alk 336 ast 67 and alt 61. hep b immune 40.5  Sept 18 2019 na 140 and k 4.7 and cl 103 and glu 121 and bun 12 and cr 0.79 alb 3,8 and tb 0,9 and ca 9.6 and ast 49 and alt 48 and alk 334 and a1c 5.7 and chol 228 and trg 52 and hdl 86 and ldl 132 nd psa 0.01 and wbc 4.2 and hg 144 and plat 183.  June 19 2019 and liver midly coarse and 1cm hepatic cyst and stable and gb not distended and no stones and bile ducts not dilated and spleen stable 12.3cm abd right kdiney 11cm and several right kidney cysts up to 6.2cm. Saw stone sin the right kidneuy. No hydronephrosios. Vessels patent. No change vs 2018.  Dec 2018 u.s with liver appearing fatty and patent vessels. Right kidney cyst 6.1x4.7x5.3cm stable abd simple. Liver cyst 1.1x0.8x1.1.cm. Similar to prior scan.  April 2019 labs wbc 4.3 hg 14.7 plat 183 and glu 134 and cr 0.85 and na 141 k 5.1 and tb 1.0 and alk 446 and ast 85 and alt 91.  Feb 2019 alk 418 and ast 79 and alt 81.  Dec 2018 alk 440 and tb 0.9 and db 0.48 and ast 76 and alt 92. Liver 76% and bone 20% and intestine 4%.  11/26/2018: wbc 4.6, hgb 14.7, plts 175,000, gluc 130, vet 0.89, na 142, k 4.0, alb 4.4, frances 2.9, t.bili 1.0, , AST 75, ASLT 80,INR 1.0, TSH 2.26  9/04/2018: wbc 4.6, hgb 14.4 plts 166,000, gluc 130, creat 0.95, na 143, k 4.5, alb 4.3, frances 3.1, t.bili 0.8, , AST56, ALT 62, INR 1.0, TSH 2.17,  6/05/2018: HBsAb 4.5, HBsAg neg, HBcAb neg, HAV pos  6/20/2018: liver echogenic suggesting mild fatty infiltration, simple cyst 1.1 x 0.7 x 1.3,, gallbladder unremarkable, mpv patent, cbd 4mm, right kidney simple cyst 5.6 x 4.8 x 5.2 cm, echoegenic focus 1.3cm consider kidney stone.  U/s showed fatty liver and he was placed on a low glycemic carbohydrate and stable on this.  His cholesterol is checked by Dr. Sachin Dimas. He says cholesterol has been ok.   Recommended him again to get a bone density and he did this at [facility]:  11-13-19: spine normal and t score 0.1 and z score 0.1/ femur neck -0.9 and -0.4 and femur total -1.0 and -0.9. He shared with primary md. he may dothis late this year.  He is not doing any teas or herbals.  Due to weight max dose 1250mg is max dose.  Plan: 1. Redo the labs in 3m and 6m. 2. Do the mri [place] to look in july. 3. See in dec 2021.   Stressed to pt the need for social distancing and strict handwashing and wearing a mask and to follow any other new or added CDC recommendations as this is an evolving target.  Duration of the visit was 30  min with 5 min of prep and then 25 min by dox audio and then by phone  from 1058 to 1123 am as doximity failed due to poor internet  for the visit  with greater than 50% of the time spent on coordination of care and in reviewing chart with the pt.

## 2021-06-22 ENCOUNTER — TELEPHONE ENCOUNTER (OUTPATIENT)
Dept: URBAN - METROPOLITAN AREA SURGERY CENTER 30 | Facility: SURGERY CENTER | Age: 81
End: 2021-06-22

## 2021-07-05 ENCOUNTER — LAB OUTSIDE AN ENCOUNTER (OUTPATIENT)
Dept: URBAN - METROPOLITAN AREA TELEHEALTH 2 | Facility: TELEHEALTH | Age: 81
End: 2021-07-05

## 2021-07-17 ENCOUNTER — TELEPHONE ENCOUNTER (OUTPATIENT)
Dept: URBAN - METROPOLITAN AREA CLINIC 92 | Facility: CLINIC | Age: 81
End: 2021-07-17

## 2021-07-17 NOTE — HPI-TODAY'S VISIT:
Dear Roel Trejo, July 10 MRI shows the lower thorax to have bibasilar atelectasis. Liver shows morphologic changes of chronic liver disease with nodular contour and lobar redistribution.  They do see changes in the liver parenchyma that are suggestive of fibrosis.  More confluent fibrosis seen in the right lobe. No definite suspicious liver lesions seen.  You do have some perfusion anomalies which need to be rechecked in 6 months.  Scattered hepatic cysts are seen. Gallbladder was unremarkable and mild intrahepatic bile duct dilation was seen also more conspicuous in the right lobe versus the left.  This is felt to be compatible with your PBC diagnosis.  No extrahepatic bile duct dilation seen. Spleen top normal at 12.9 cm with small splenule in the left upper quadrant. Pancreas normal. Renal cysts were seen. Colon diverticulosis was noted as well. No inflammation however was seen in the colon. Mild paraesophageal perigastric and perisplenic varices were seen and moderate aortobiiliac atherosclerosis seen. Overall they felt you had signs of chronic liver disease but no evidence of any malignancy. Dr. Longoria

## 2021-09-01 ENCOUNTER — LAB OUTSIDE AN ENCOUNTER (OUTPATIENT)
Dept: URBAN - METROPOLITAN AREA TELEHEALTH 2 | Facility: TELEHEALTH | Age: 81
End: 2021-09-01

## 2021-09-05 ENCOUNTER — TELEPHONE ENCOUNTER (OUTPATIENT)
Dept: URBAN - METROPOLITAN AREA CLINIC 92 | Facility: CLINIC | Age: 81
End: 2021-09-05

## 2021-09-05 LAB
A/G RATIO: 1.2
ALBUMIN: 4.1
ALKALINE PHOSPHATASE: 327
ALT (SGPT): 45
AST (SGOT): 47
BASO (ABSOLUTE): 0
BASOS: 1
BILIRUBIN, TOTAL: 1
BUN/CREATININE RATIO: 15
BUN: 13
CALCIUM: 10.2
CARBON DIOXIDE, TOTAL: 27
CHLORIDE: 103
CREATININE: 0.84
EGFR IF AFRICN AM: 96
EGFR IF NONAFRICN AM: 83
EOS (ABSOLUTE): 0.3
EOS: 8
GLOBULIN, TOTAL: 3.4
GLUCOSE: 143
HEMATOCRIT: 45.7
HEMATOLOGY COMMENTS:: (no result)
HEMOGLOBIN: 14.9
IMMATURE CELLS: (no result)
IMMATURE GRANS (ABS): 0
IMMATURE GRANULOCYTES: 0
INR: 1
LYMPHS (ABSOLUTE): 2
LYMPHS: 46
MCH: 29.2
MCHC: 32.6
MCV: 90
MONOCYTES(ABSOLUTE): 0.5
MONOCYTES: 11
NEUTROPHILS (ABSOLUTE): 1.4
NEUTROPHILS: 34
NRBC: (no result)
PLATELETS: 144
POTASSIUM: 4.5
PROTEIN, TOTAL: 7.5
PROTHROMBIN TIME: 10.9
RBC: 5.1
RDW: 12.5
SODIUM: 140
WBC: 4.3

## 2021-09-05 NOTE — HPI-TODAY'S VISIT:
Dear Roel Trejo. September 3 labs show INR remains perfectly normal at 1.0 and was previously 1.1. Glucose still remains elevated at 143 previously 129 and share with primary provider.  BUN of 13 creatinine 0.84 sodium 140 potassium 4.5 calcium 10.2 albumin 4.1 bilirubin 1.0.  Previously bilirubin 0.7 but remains normal again at 1.0.  Alkaline phosphatase slightly up at 327 from 309 previously 355.  AST down to 47 from 55 from prior 63.  ALT 45 down from 51 and prior 59 so those continue to drop.  White blood cell count 4.3 hemoglobin 14.9 platelet count 144 which is slightly lower from 162.  MCV 90.  Neutrophils normal at 1.4. Meld remains low at 6 and meld na 6. Doing well on current meds with some labs still dropping without the ocaliva. Dr Longoria

## 2021-10-05 ENCOUNTER — TELEPHONE ENCOUNTER (OUTPATIENT)
Dept: URBAN - METROPOLITAN AREA CLINIC 86 | Facility: CLINIC | Age: 81
End: 2021-10-05

## 2021-12-01 ENCOUNTER — LAB OUTSIDE AN ENCOUNTER (OUTPATIENT)
Dept: URBAN - METROPOLITAN AREA TELEHEALTH 2 | Facility: TELEHEALTH | Age: 81
End: 2021-12-01

## 2021-12-01 ENCOUNTER — TELEPHONE ENCOUNTER (OUTPATIENT)
Dept: URBAN - METROPOLITAN AREA CLINIC 92 | Facility: CLINIC | Age: 81
End: 2021-12-01

## 2021-12-01 LAB
A/G RATIO: 1.7
ALBUMIN: 4.5
ALKALINE PHOSPHATASE: 394
ALT (SGPT): 68
AST (SGOT): 63
BASO (ABSOLUTE): 0
BASOS: 1
BILIRUBIN, TOTAL: 1
BUN/CREATININE RATIO: 15
BUN: 13
CALCIUM: 10.3
CARBON DIOXIDE, TOTAL: 27
CHLORIDE: 102
CREATININE: 0.84
EGFR IF AFRICN AM: 96
EGFR IF NONAFRICN AM: 83
EOS (ABSOLUTE): 0.2
EOS: 4
GLOBULIN, TOTAL: 2.7
GLUCOSE: 154
HEMATOCRIT: 45
HEMATOLOGY COMMENTS:: (no result)
HEMOGLOBIN: 14.8
IMMATURE CELLS: (no result)
IMMATURE GRANS (ABS): 0
IMMATURE GRANULOCYTES: 0
INR: 1
LYMPHS (ABSOLUTE): 1.7
LYMPHS: 44
MCH: 29.6
MCHC: 32.9
MCV: 90
MONOCYTES(ABSOLUTE): 0.5
MONOCYTES: 14
NEUTROPHILS (ABSOLUTE): 1.5
NEUTROPHILS: 37
NRBC: (no result)
PLATELETS: 146
POTASSIUM: 4.8
PROTEIN, TOTAL: 7.2
PROTHROMBIN TIME: 10.5
RBC: 5
RDW: 13.1
SODIUM: 142
WBC: 4

## 2021-12-01 NOTE — HPI-TODAY'S VISIT:
Dear Roel Trejo, November 30 labs show INR normal at 1.0 which is good to see.  Glucose currently 154 elevated and previously was 143 and prior to that 129.  All 3 were elevated.  We have discussed this previously.  Please share with primary providers per their review. BUN of 13 creatinine 0.84 sodium 142 potassium 4.8 chloride 102 CO2 of 27 calcium elevated at 10.3..  Are you on any calcium supplements?  Previously was normal at 10.2 and prior 10.1.  Please share with primary provider also.  Albumin 4.5 bilirubin 1.0 which is normal.  Alkaline phosphatase elevated at 394 previously 327 and prior 309.  AST 63 and ALT 68 which appeared to be slightly higher from prior AST of 47 ALT 45. Any new medicines? White blood cell count 4 hemoglobin 14.8 plate count slightly low at 146 but improved from last time at 144.  MCV normal at 90.  Neutrophils 1.5 and lymphocytes 1.7. Meld low at 6 and meld na 6. If liver labs are not improving on next labs may need to consider options for you to readd the Ocaliva. Dr. Longoria

## 2021-12-14 ENCOUNTER — OFFICE VISIT (OUTPATIENT)
Dept: URBAN - METROPOLITAN AREA TELEHEALTH 2 | Facility: TELEHEALTH | Age: 81
End: 2021-12-14
Payer: MEDICARE

## 2021-12-14 DIAGNOSIS — K74.3 PBC (PRIMARY BILIARY CIRRHOSIS): ICD-10-CM

## 2021-12-14 DIAGNOSIS — Z79.899 HIGH RISK MEDICATION USE: ICD-10-CM

## 2021-12-14 DIAGNOSIS — K74.02 HEPATIC FIBROSIS, ADVANCED FIBROSIS: ICD-10-CM

## 2021-12-14 DIAGNOSIS — L29.9 PRURITIC CONDITION: ICD-10-CM

## 2021-12-14 PROCEDURE — 99214 OFFICE O/P EST MOD 30 MIN: CPT

## 2021-12-14 RX ORDER — URSODIOL 500 MG/1
TAKE 1.5 TABLET IN AM AND 1 IN PM (TAKE WITH FOOD) TABLET ORAL TWICE A DAY
Qty: 225 | Refills: 1

## 2021-12-14 RX ORDER — AMLODIPINE BESYLATE 5 MG/1
TAKE 1 TABLET (5 MG) BY ORAL ROUTE ONCE DAILY TABLET ORAL 1
Qty: 0 | Refills: 0 | Status: ACTIVE | COMMUNITY
Start: 1900-01-01

## 2021-12-14 RX ORDER — GABAPENTIN 300 MG/1
1 CAPSULE CAPSULE ORAL TWICE A DAY
Status: ACTIVE | COMMUNITY

## 2021-12-14 RX ORDER — GLIPIZIDE 5 MG/1
0.5 TABLET 30 MINUTES BEFORE BREAKFAST TABLET ORAL ONCE A DAY
Status: ACTIVE | COMMUNITY

## 2021-12-14 RX ORDER — URSODIOL 500 MG/1
TAKE 1 TABLET TWICE A DAY TABLET ORAL TWICE A DAY
Qty: 180 | Refills: 1 | Status: ACTIVE | COMMUNITY

## 2021-12-14 NOTE — HPI-OTHER HISTORIES
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  He did the covid 19 series and March 5 2021.  Meds off ocaliva for 11 m.  He prior had started the gabapentin and says he uses this for pruritis occ. Says warmer clothing.  November 30 labs show INR normal at 1.0 which is good to see.  Glucose currently 154 elevated and previously was 143 and prior to that 129.  All 3 were elevated.  We have discussed this previously.  Please share with primary providers per their review. BUN of 13 creatinine 0.84 sodium 142 potassium 4.8 chloride 102 CO2 of 27 calcium elevated at 10.3. Asked if he is on any calcium supplements?  he said not taking any that he knows. Previously was normal at 10.2 and prior 10.1.  Please share with primary provider also.  Albumin 4.5 bilirubin 1.0 which is normal.  Alkaline phosphatase elevated at 394 previously 327 and prior 309.  AST 63 and ALT 68 which appeared to be slightly higher from prior AST of 47 ALT 45. Asked if he had any new meds and he says he is a vit d supplement. He says he did the covid 19 vaccine booster in Aug. Not ill recently. White blood cell count 4 hemoglobin 14.8 platelet count slightly low at 146 but improved from last time at 144.  MCV normal at 90.  Neutrophils 1.5 and lymphocytes 1.7. Meld low at 6 and meld na 6.  Larger debate is again 81 to you use ocaliva. When on it before he was never was normal. When went off it, itching was better. Prior always on and off due to itching even at lower dose.  When came off ok until just now and we could follow labs and see what is doing it. Marimar given and boxed warning re this med.  September 3 labs show INR remains perfectly normal at 1.0 and was previously 1.1. Glucose still remains elevated at 143 previously 129 and share with primary provider.  BUN of 13 creatinine 0.84 sodium 140 potassium 4.5 calcium 10.2 albumin 4.1 bilirubin 1.0.  Previously bilirubin 0.7 but remains normal again at 1.0.  Alkaline phosphatase slightly up at 327 from 309 previously 355.  AST down to 47 from 55 from prior 63.  ALT 45 down from 51 and prior 59 so those continue to drop.  White blood cell count 4.3 hemoglobin 14.9 platelet count 144 which is slightly lower from 162.  MCV 90.  Neutrophils normal at 1.4. Meld remains low at 6 and meld na 6.  Last mri in July and due for another one in Jan 2022.   July 10 MRI shows the lower thorax to have bibasilar atelectasis. Liver shows morphologic changes of chronic liver disease with nodular contour and lobar redistribution.  They do see changes in the liver parenchyma that are suggestive of fibrosis.  More confluent fibrosis seen in the right lobe. No definite suspicious liver lesions seen.  You do have some perfusion anomalies which need to be rechecked in 6 months.  Scattered hepatic cysts are seen. Gallbladder was unremarkable and mild intrahepatic bile duct dilation was seen also more conspicuous in the right lobe versus the left.  This is felt to be compatible with your PBC diagnosis.  No extrahepatic bile duct dilation seen. Spleen top normal at 12.9 cm with small splenule in the left upper quadrant. Pancreas normal. Renal cysts were seen. Colon diverticulosis was noted as well. No inflammation however was seen in the colon. Mild paraesophageal perigastric and perisplenic varices were seen and moderate aortobiiliac atherosclerosis seen. Overall they felt you had signs of chronic liver disease but no evidence of any malignancy.   June 9 laboratories show white blood cell count 4.3 hemoglobin 13.8 platelet 162.  These are stable for you.  Platelet count is actually better than it was before at 151.  Neutrophils are stable at 1.6 and previously were 1.5.  Total bilirubin is down to 0.7 from 0.8.  Alkaline phosphatase is lower at 309 from 355.  AST down to 55 from 63 ALT 51 down from 59 and prior to that 68.  So the liver labs continue to be dropping.  Sodium 139 potassium 4.6 chloride 102 CO2 26 BUN 17 creatinine 0.93 glucose 129.  Sugar is actually lower than the previous time at 146. INR 1.1.   Meld score low at 7 and meld na 7.  April 27: alb 4.3 and tb 1.1 and db 0.43 and alk 298 and ast 60 and alt 60.  March 2: alk 355 and ast 63 and alt 59 so in fact alk phos lower now to 298 and ast and alt about the same.  8 weeks off labs March 2 and inr 1.0 and tb 0.8 and alk 355 and down from 376 and prior 392. ast 63 and alt 59 and down from 65 and 68 so little lower.  ca 10.0. na 140 and k 5.0 and glu 146 elevated. bun 18 and cr 0.9. wbc 3.9 and hg 14.9 and plat 151 and mcv 90.   4 weeks off labs: jan 25 2021 and inr normal 1.0 and so little lower now. tb 0.8 and lower from 0.9 and alk 376 from 392 so lower and ast 65 and alt 68 and prior ast 60 and alt 68 so not much of a change seen. na 143 and k 4.6 and cl 103 and co2 23 and cr 0.93. glu 128 elevated and mentioned to him. defer to local md re your sugars. cr 0.93. wbc 4.1 hg 14.5 and plat 138 (down from 165) and mcv 89.  Jan 23 mri: Morphologic changes of chronic liver disease without  hepatocellular carcinoma seen so that is good to note. Patent portal venous system with stigmata portal hypertension, including upper abdominal varices and splenomegaly noted so need to stay on top of egd surveillance. Right renal cyst with area of complexity on prior ultrasound demonstrates no septation, enhancement, or nodularity on this examination, compatible  with a simple cyst ( 4.7 x 6.2 x 7.8 cm.) This may have represented artifact on prior ultrasound. Continued attention on follow-up for liver disease was recommended. Liver showed no fat or iron. Scattered simple cysts in the left hepatic  lobe. Periesophageal, perigastric, perisplenic varices seen. Mild intrahepatic duct dilatation peripherally extending to the capsule, greater in the right hepatic lobe, compatible with primary biliary cirrhosis. No extrahepatic biliary ductal dilatation. Normal gallbladder. Spleen was enlarged measuring 13.1 cm in the craniocaudal dimension.Adjacent splenule. Pancreas normal. Mild atherosclerotic disease abdominal aorta without aneurysm. Mild degenerative changes of the spine.  Document Type: MRI Abdomen w/ + w/o Contrast Document Date: January 23, 2021 09:35 Document Status: Auth (Verified) Document Title: MRI Abdomen w/ + w/o Contrast Performed By: Jason Kapadia Verified By: Jason Kapadia on January 25, 2021 07:35 Encounter info: [id number], [facility], Single Visit OP, 1/23/2021 - 1/23/2021 * Final Report * Reason For Exam PBC//HEPATIC FIBROSIS//DIABETES//FATTY LIVER//RENAL CYST//ELEVATED ALKALINE PHOSPHATASE LEVEL REPORT EXAM: MRI Abdomen w/ + w/o Contrast CLINICAL INDICATION: PBC//HEPATIC FIBROSIS//DIABETES//FATTY LIVER//RENAL CYST//ELEVATED ALKALINE PHOSPHATASE LEVEL. TECHNIQUE: Multisequence, multiplanar MRI of the abdomen was performed without and with intravenous contrast. ESRC.2.7.3 CONTRAST: 16 cc of Prohance COMPARISON: Outside MRI dated 9/1/2020. Abdominal ultrasound dated 8/10/2020. FINDINGS: Lower Thorax: Normal. Liver: No fat or iron. Morphologic changes of chronic liver disease, including lobar redistribution and nodular contour. Delayed reticular enhancement of the hepatic parenchyma, compatible with fibrosis. More confluent fibrosis in the right hepatic lobe. Scattered simple cysts in the left hepatic lobe. No hepatocellular carcinoma. Hepatic vasculature is patent. Recannulized paraumbilical vein. Periesophageal, perigastric, perisplenic varices. Gallbladder/Biliary Tree: Mild intrahepatic duct dilatation peripherally extending to the capsule, greater in the right hepatic lobe, compatible primary biliary cirrhosis. No extrahepatic biliary ductal dilatation. Normal gallbladder. Spleen: Enlarged measuring 13.1 cm in the craniocaudal dimension. Adjacent splenule. Pancreas: Normal. Adrenal Glands: Normal. Kidneys/Ureters: Renal cysts. Right renal cyst with area of complexity on prior ultrasound demonstrates no septation, enhancement, or nodularity on this examination and measures 4.7 x 6.2 x 7.8 cm. Gastrointestinal: No bowel obstruction. Lymph Nodes: Normal. Vessels: Mild atherosclerotic disease abdominal aorta without aneurysm. Peritoneum/Retroperitoneum: Normal. Bones/Soft Tissues: Mild degenerative changes of the spine. IMPRESSION: 1. Morphologic changes of chronic liver disease without hepatocellular carcinoma. 2. Patent portal venous system with stigmata portal hypertension, including upper abdominal varices and splenomegaly. 3. Right renal cyst with area of complexity on prior ultrasound demonstrates no septation, enhancement, or nodularity on this examination, compatible with a simple cyst. This may have represented artifact on prior ultrasound. Continued attention on follow-up for liver disease. Signature Line *** Final ***  Electronically Signed By: Jason Kaapdia on 01/25/2021 07:35 Dictated by: Jason Kapadia  2 weeks off ocaliva: Jan 15: glu 117 elevated some but similar to nov 117 but down from 204 in dec 30. bun 16 and cr 0.91. na 141 and k 4.7 and cl 102 and ca 10.1 and alb 4.2 and tb 0.9 down from 1.1 in nov. ast 60 and alt 68 and so about the same as you can see vs other two labs and alk 392  about the same vs dec but little up from nov 354. wbc 4.1 hg 14.8 plat 165. inr 1.1 stable. So no dramatic changes seen yet that point.  Dec 30 2020 labs in middle: wbc 3.9 hg 15.1 plat 167. mcv 97. tb 0.9 down  from 1.1 and alk 399 slightly higher from 354. ast 64 and alt 66 and prior ast 63 and alt 60. na 139 and k 4.7 and cl 99 and co2 27. glu 204 elevated so that is newer issue as prior 117 and 134 so show to local md. bun 15 and cr 1.07 little up and prior 0.85 so little dry and could be linked to the sugars.  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  He is on urosodiol 500mg BID and had been on for years Ocaliva 10mg q other day due to pruritis and then off due to pruritis.  He weighs 183. Max dose 1100 to 1250 range 13-15mg/kg and we can ursodiol to 1.5 in the am and 1 in the evening. that may help and will avoid something more risky.  Itching much better on the gabapentin and off the ocaliva.  Nov 25 2020: wbc 4.2 hg 15 and plat 150 and mcv 90. Neutrophils 1.3 and prior 1.4 and lymphs 2.0 and prior 1.9. tb 1.1 and alk 354 and ast 63 and alt  60 and prior tb 0.9 and alk 343 and ast 55 and alt 61. So about the same. glu 117 and down from 134. bun 15 and cr 0.85 and na 139 and k 4.4 and cl 101 and co2 25. alb 4.2 normal.  Saw local mri: 9-1 20: nonenhancing renal cyst and no definite renal mass. Largest cyst right kidney 5.7cm. Not surprisingly they thought liver appeared to be cirrhotic. Also apparent nonenhancing cysts in the liver up to 10.6mm. Nonspecific biliary dilation in liver. Extrahepatic duct appears decompressed. Nonspecific splenomegaly seen and no significant varices seen. Left adrenal suspected adenoma. Appendix seemed somewhat prominent to then at 6.4mm but was probably similar to prior per them. No definite gallstones. Prior [place] imaging liver coarsened. They recommended mri to better see possible complex renal cyst.  Saw urologist re the kidney issue was stable. They were to see him again in feb 2021 and had been changed.  The patient relates no significant family or personal history of liver disease. He states no history of new medications or alcohol use. The  patient reports a personal history of no other habits that could cause liver damage.  July 2020: tsh very low and show local md. T4 normal 8.5. inr 1.1 and tb 0.9 and alk 343 and ast 55 and alt 61 and tb 0.9 and alb 4.2 and ca 10.6 elevated and show local md also. na 144 and k 4.8. glu 134 elevated and maybe not fasting. cr 0.83. wbc 4.1 hg 15.3 plat 170.  Prior April ast 67 and alt 66 and alk 362 and tb 0.9 so liver labs little lower this last time despite the low dose and alk little lower also. calcium prior 10.1 and is not on any calcium supplements.  He says local doctor following thyroid and says he is doing better.  4- wbc 4.0 and hg 14.3 and plat 155 and neutrophils 1.2 little low. glu 130 and cr 0.78 and na 140 and k 4.4 and cl 101 and co2 22 and alb 4.3 and tb 0.9 and alk 362 and ast 67 and alt 66 and inr 1.1.  2-17-20 and wbc 4.1 hg 14.5 plat 153 and neutrophils 1.3 and glu 143 and cr 0.96 and na 140 and k 4.3 and cl 100 and co2 25 and alb 4.0 and tb 0.8 and alk 352 and ast 66 and alt 61 and inr 1.0.  12-16-19 and wbc 3.6 and hg 14.3 and plat 184 and neutrophils 1.3 and glu 124 and cr 0.86 and na 142 and k 5.0 and cl 102 and cl2 25 and alb 4.3 and tb 0.9 and alk 353 and ast 70 and alt 67 and inr 1.0.  Document Type: US Abdomen Doppler Complete Document Date: August 10, 2020 10:04 Document Status: Auth (Verified) Document Title: US Abdomen Doppler Complete Performed By: Jack Martin Verified By: Candelaria Rodriguez on August 10, 2020 11:41 Encounter info: [id number], [facility], Single Visit OP, 8/10/2020 - * Final Report * Reason For Exam primary billary cholangitis REPORT EXAM: US Abdomen Doppler Complete, US Abdomen Complete CLINICAL INDICATION: Primary billary cholangitis. TECHNIQUE: Grayscale, pulsed wave and color Doppler sonography of the upper abdomen were performed. ESRC.3.7.1 COMPARISON: None FINDINGS: Liver: Coarsened echotexture. No lesions. Bile Ducts: No dilated intrahepatic biliary radicles. Common duct measures 4 mm. Gallbladder: Normal. Wick's sign is negative. Pancreas: Partially obscured by overlying structures. Right Kidney: Measures 11.4 cm. Normal echogenicity. No hydronephrosis. 6.4 x 5.9 x 5.3 cm inferior pole cystic lesion with internal thickened septation versus areas of nodularity, indeterminate. Hyperechoic nonshadowing cortical focus measuring 0.5 x 0.4 x 0.2 cm.. Nonshadowing hyperechoic focus near the corticomedullary junction measuring 1.1 x 0.8 x 0.6 cm. Left Kidney: Measures 10.6 cm. Normal echogenicity. No hydronephrosis. Spleen: Measures 12.2 cm. Aorta: Normal caliber where imaged. IVC: Normal proximally, not imaged distally. Hepatic Veins: Normal. Portal Veins: Hepatopetal flow. Velocity of 27 cm/s in the main portal vein, 25 cm/s in the right portal vein, and 20 cm/s in the left portal vein. Hepatic Arteries: Peak systolic velocity 115 cm/s. Resistive index 0.77. Other: None. IMPRESSION: 1. Complex cystic lesion within the right kidney with thickened septation/areas of nodularity. Recommend MRI for further evaluation to exclude enhancing/solid components. Nonshadowing echogenic foci in the right kidney may represent nonobstructing stones although underlying lesions cannot be excluded. This can be evaluated at the time of MRI. 2. Coarsened hepatic echotexture can be seen with hepatic steatosis or chronic liver disease. No intra or extrahepatic biliary ductal dilation. Patent hepatic vasculature. The images were reviewed and interpreted by Candelaria Rodriguez MD. Signature Line *** Final *** Electronically Signed By: Candelaria Rodriguez on 08/10/2020 11:41 Dictated by: Jack Martin  12- u/s coarse hepatic ehcotexure and consistent with cirrhosis and 1.2cm hepatic cyst. Patent vessels and right renal cysts up to 5.7cm and some nonobstructing right renal calculi. Spleen 12.3cm.  Oct 14 2019 wbc 3.8 and hg 14.4 plat 177 and neutrophils 1.3 little low and glu 123 and cr 0.74 and na 141 and k 4.5 and cl 100 and co2 25 and alb 4.3 and tb 0.9 and alk 336 ast 67 and alt 61. hep b immune 40.5  Sept 18 2019 na 140 and k 4.7 and cl 103 and glu 121 and bun 12 and cr 0.79 alb 3,8 and tb 0,9 and ca 9.6 and ast 49 and alt 48 and alk 334 and a1c 5.7 and chol 228 and trg 52 and hdl 86 and ldl 132 nd psa 0.01 and wbc 4.2 and hg 144 and plat 183.  June 19 2019 and liver midly coarse and 1cm hepatic cyst and stable and gb not distended and no stones and bile ducts not dilated and spleen stable 12.3cm abd right kdiney 11cm and several right kidney cysts up to 6.2cm. Saw stone sin the right kidneuy. No hydronephrosios. Vessels patent. No change vs 2018.  Dec 2018 u.s with liver appearing fatty and patent vessels. Right kidney cyst 6.1x4.7x5.3cm stable abd simple. Liver cyst 1.1x0.8x1.1.cm. Similar to prior scan.  April 2019 labs wbc 4.3 hg 14.7 plat 183 and glu 134 and cr 0.85 and na 141 k 5.1 and tb 1.0 and alk 446 and ast 85 and alt 91.  Feb 2019 alk 418 and ast 79 and alt 81.  Dec 2018 alk 440 and tb 0.9 and db 0.48 and ast 76 and alt 92. Liver 76% and bone 20% and intestine 4%.  11/26/2018: wbc 4.6, hgb 14.7, plts 175,000, gluc 130, vet 0.89, na 142, k 4.0, alb 4.4, frances 2.9, t.bili 1.0, , AST 75, ASLT 80,INR 1.0, TSH 2.26  9/04/2018: wbc 4.6, hgb 14.4 plts 166,000, gluc 130, creat 0.95, na 143, k 4.5, alb 4.3, frances 3.1, t.bili 0.8, , AST56, ALT 62, INR 1.0, TSH 2.17,  6/05/2018: HBsAb 4.5, HBsAg neg, HBcAb neg, HAV pos  6/20/2018: liver echogenic suggesting mild fatty infiltration, simple cyst 1.1 x 0.7 x 1.3,, gallbladder unremarkable, mpv patent, cbd 4mm, right kidney simple cyst 5.6 x 4.8 x 5.2 cm, echoegenic focus 1.3cm consider kidney stone.  U/s showed fatty liver and he was placed on a low glycemic carbohydrate and stable on this.  His cholesterol is checked by Dr. Sachin Dimas. He says cholesterol has been ok.   Recommended him again to get a bone density and he did this [place] [facility] [place]  [place]13-19: spine normal and t score 0.1 and z score 0.1/ femur neck -0.9 and -0.4 and femur total -1.0 and -0.9. He shared with primary md. he will check with primary md to redo as been 2 yrs.  He is not doing any teas or herbals.   Plan: 1. Increase ursodol to 500mg 1.5am and 1 in the evening with food. 2.  Redo the labs in 1m and 3m. 3. Look at trend. 4. Mri next month. 5, Plan for 3m revist. 6. If not better could try to relook at the low dose of ocaliva. I am concerned re age and severe pruritis he had prior.   Stressed to pt the need for social distancing and strict handwashing and wearing a mask and to follow any other new or added CDC recommendations as this is an evolving target.  Duration of the visit was 34 min with 5 min of prep and 29 min by healow with greater than 50% of the time spent on coordination of care and in reviewing chart with the pt.

## 2021-12-30 ENCOUNTER — TELEPHONE ENCOUNTER (OUTPATIENT)
Dept: URBAN - METROPOLITAN AREA CLINIC 86 | Facility: CLINIC | Age: 81
End: 2021-12-30

## 2022-01-03 ENCOUNTER — LAB OUTSIDE AN ENCOUNTER (OUTPATIENT)
Dept: URBAN - METROPOLITAN AREA TELEHEALTH 2 | Facility: TELEHEALTH | Age: 82
End: 2022-01-03

## 2022-01-14 ENCOUNTER — LAB OUTSIDE AN ENCOUNTER (OUTPATIENT)
Dept: URBAN - METROPOLITAN AREA TELEHEALTH 2 | Facility: TELEHEALTH | Age: 82
End: 2022-01-14

## 2022-02-01 ENCOUNTER — TELEPHONE ENCOUNTER (OUTPATIENT)
Dept: URBAN - METROPOLITAN AREA CLINIC 92 | Facility: CLINIC | Age: 82
End: 2022-02-01

## 2022-02-01 NOTE — HPI-TODAY'S VISIT:
Dear Roel Trejo, January 29 MRI back today. Lower thorax showed bibasilar atelectasis which means that the bases of the lungs were not fully expanded.  Sometimes we can see that when you are in the MRI machine in that fixed position for a while.  Please share with primary provider. Liver showed no fat or iron but did have chronic liver disease changes including a nodular contour and lobar redistribution.  Some fibrosis changes seen.  No suspicious lesions.  Small left lobe hepatic cyst seen. Gallbladder normal with some unchanged mild intrahepatic bile duct dilation most pronounced in the periphery. Spleen was top normal in size at 13 cm. Pancreas, adrenal glands, and lymph nodes were normal. Stable renal cyst seen bilaterally. Mild atherosclerosis of the aorta seen without aneurysm.  Small varices near the esophagus and stomach so would need to stay on top of that EGD surveillance. Mild degenerative changes seen of the spine. Please share with primary provider. Dr Longoria

## 2022-03-14 ENCOUNTER — LAB OUTSIDE AN ENCOUNTER (OUTPATIENT)
Dept: URBAN - METROPOLITAN AREA TELEHEALTH 2 | Facility: TELEHEALTH | Age: 82
End: 2022-03-14

## 2022-03-16 ENCOUNTER — TELEPHONE ENCOUNTER (OUTPATIENT)
Dept: URBAN - METROPOLITAN AREA CLINIC 92 | Facility: CLINIC | Age: 82
End: 2022-03-16

## 2022-03-16 LAB
A/G RATIO: 1.4
ALBUMIN: 4.2
ALKALINE PHOSPHATASE: 404
ALT (SGPT): 58
AST (SGOT): 58
BASO (ABSOLUTE): 0
BASOS: 0
BILIRUBIN, TOTAL: 0.9
BUN/CREATININE RATIO: 16
BUN: 14
CALCIUM: 10
CARBON DIOXIDE, TOTAL: 24
CHLORIDE: 101
CREATININE: 0.86
EGFR: 88
EOS (ABSOLUTE): 0.2
EOS: 5
GLOBULIN, TOTAL: 3
GLUCOSE: 137
HEMATOCRIT: 46.6
HEMATOLOGY COMMENTS:: (no result)
HEMOGLOBIN: 14.9
IMMATURE CELLS: (no result)
IMMATURE GRANS (ABS): 0
IMMATURE GRANULOCYTES: 0
LYMPHS (ABSOLUTE): 1.9
LYMPHS: 46
MCH: 28.6
MCHC: 32
MCV: 89
MONOCYTES(ABSOLUTE): 0.6
MONOCYTES: 14
NEUTROPHILS (ABSOLUTE): 1.4
NEUTROPHILS: 35
NRBC: (no result)
PLATELETS: 164
POTASSIUM: 4.8
PROTEIN, TOTAL: 7.2
RBC: 5.21
RDW: 12.6
SODIUM: 139
WBC: 4.1

## 2022-03-16 NOTE — HPI-TODAY'S VISIT:
Dear Roel Trejo,  March 15 labs show glucose elevated at 137 previously 143 and please share with primary provider.  BUN of 14 creatinine 0.86 sodium 139 potassium 4.8 calcium 10.0 albumin 4.2 bilirubin 0.9.  These other labs are normal range. Alkaline phosphatase did go up to 404 from previous 394 and prior 327.  AST came down from 63 to 58.  ALT came down from 68 to 58. Hill cell count 4.1 hemoglobin 14.9 platelet count 164 normal.  MCV 89. Neutrophils 1.4 and lymphocytes 1.9. Overall slight increase in the labs seen. At your next visit we can discuss your options. Dr. Longoria

## 2022-03-28 ENCOUNTER — OFFICE VISIT (OUTPATIENT)
Dept: URBAN - METROPOLITAN AREA TELEHEALTH 2 | Facility: TELEHEALTH | Age: 82
End: 2022-03-28
Payer: MEDICARE

## 2022-03-28 DIAGNOSIS — Z13.820 SCREENING FOR OSTEOPOROSIS: ICD-10-CM

## 2022-03-28 DIAGNOSIS — E66.3 OVERWEIGHT: ICD-10-CM

## 2022-03-28 DIAGNOSIS — K76.6 PORTAL HYPERTENSION: ICD-10-CM

## 2022-03-28 DIAGNOSIS — L29.9 PRURITIC CONDITION: ICD-10-CM

## 2022-03-28 DIAGNOSIS — N20.0 RENAL STONE: ICD-10-CM

## 2022-03-28 DIAGNOSIS — E11.9 DIABETES: ICD-10-CM

## 2022-03-28 DIAGNOSIS — K74.02 HEPATIC FIBROSIS, ADVANCED FIBROSIS: ICD-10-CM

## 2022-03-28 DIAGNOSIS — R94.5 LIVER FUNCTION STUDY, ABNORMAL: ICD-10-CM

## 2022-03-28 DIAGNOSIS — N28.1 RENAL CYST: ICD-10-CM

## 2022-03-28 DIAGNOSIS — K74.3 PBC (PRIMARY BILIARY CIRRHOSIS): ICD-10-CM

## 2022-03-28 DIAGNOSIS — K76.0 FATTY LIVER: ICD-10-CM

## 2022-03-28 PROCEDURE — 99214 OFFICE O/P EST MOD 30 MIN: CPT

## 2022-03-28 RX ORDER — GLIPIZIDE 5 MG/1
0.5 TABLET 30 MINUTES BEFORE BREAKFAST TABLET ORAL ONCE A DAY
Status: ACTIVE | COMMUNITY

## 2022-03-28 RX ORDER — GABAPENTIN 300 MG/1
1 CAPSULE CAPSULE ORAL TWICE A DAY
Status: ACTIVE | COMMUNITY

## 2022-03-28 RX ORDER — AMLODIPINE BESYLATE 5 MG/1
TAKE 1 TABLET (5 MG) BY ORAL ROUTE ONCE DAILY TABLET ORAL 1
Qty: 0 | Refills: 0 | Status: ACTIVE | COMMUNITY
Start: 1900-01-01

## 2022-03-28 RX ORDER — FAMOTIDINE 40 MG/1
1 TABLET AT BEDTIME TABLET, FILM COATED ORAL ONCE A DAY
Status: ACTIVE | COMMUNITY

## 2022-03-28 RX ORDER — URSODIOL 500 MG/1
TAKE 1.5 TABLET IN AM AND 1 IN PM (TAKE WITH FOOD) TABLET ORAL TWICE A DAY
Qty: 225 | Refills: 1 | Status: ACTIVE | COMMUNITY

## 2022-03-28 RX ORDER — URSODIOL 500 MG/1
TAKE 1.5 TABLET IN AM AND 1 IN PM (TAKE WITH FOOD) TABLET ORAL TWICE A DAY
Qty: 225 | Refills: 1

## 2022-03-28 NOTE — HPI-TODAY'S VISIT:
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   March 15 labs show glucose elevated at 137 previously 143 and please share with primary provider.  BUN of 14 creatinine 0.86 sodium 139 potassium 4.8 calcium 10.0 albumin 4.2 bilirubin 0.9.  These other labs are normal range. Alkaline phosphatase did go up to 404 from previous 394 and prior 327.  AST came down from 63 to 58.  ALT came down from 68 to 58. Hill cell count 4.1 hemoglobin 14.9 platelet count 164 normal.  MCV 89. Neutrophils 1.4 and lymphocytes 1.9.  Pt says he is about the same.  Prior he was on  high dose and then the lower dose and then no dose and no change in the labs but less miserable off it.  New medicines next year coming.  Ocaliva has toxicity risk and getting older.  January 29 2022 MRI Lower thorax showed bibasilar atelectasis which means that the bases of the lungs were not fully expanded.  So metimes we can see that when you are in the MRI machine in that fixed position for a while.  Please share with primary provider. Liver showed no fat or iron but did have chronic liver disease changes including a nodular contour and lobar redistribution.  Some fibrosis changes seen.  No suspicious lesions.  Small left lobe hepatic cyst seen. Gallbladder normal with some unchanged mild intrahepatic bile duct dilation most pronounced in the periphery. Spleen was top normal in size at 13 cm. Pancreas, adrenal glands, and lymph nodes were normal. Stable renal cyst seen bilaterally. Mild atherosclerosis of the aorta seen without aneurysm.  Small varices near the esophagus and stomach so would need to stay on top of that EGD surveillance. Mild degenerative changes seen of the spine.  He has not had any recent egd and Dr Gabby Vann and she can do an egd.  He did the covid 19 series and March 5 2021. He did the covid booster.  Meds off ocaliva for 15 months.   November 30 labs show INR normal at 1.0 which is good to see.  Glucose currently 154 elevated and previously was 143 and prior to that 129.  All 3 were elevated.  We have discussed this previously.  Please share with primary providers per their review. BUN of 13 creatinine 0.84 sodium 142 potassium 4.8 chloride 102 CO2 of 27 calcium elevated at 10.3. Asked if he is on any calcium supplements?  he said not taking any that he knows. Previously was normal at 10.2 and prior 10.1.  Please share with primary provider also.  Albumin 4.5 bilirubin 1.0 which is normal.  Alkaline phosphatase elevated at 394 previously 327 and prior 309.  AST 63 and ALT 68 which appeared to be slightly higher from prior AST of 47 ALT 45. Asked if he had any new meds and he says he is a vit d supplement. White blood cell count 4 hemoglobin 14.8 platelet count slightly low at 146 but improved from last time at 144.  MCV normal at 90.  Neutrophils 1.5 and lymphocytes 1.7. Meld low at 6 and meld na 6.    September 3 labs show INR remains perfectly normal at 1.0 and was previously 1.1. Glucose still remains elevated at 143 previously 129 and share with primary provider.  BUN of 13 creatinine 0.84 sodium 140 potassium 4.5 calcium 10.2 albumin 4.1 bilirubin 1.0.  Previously bilirubin 0.7 but remains normal again at 1.0.  Alkaline phosphatase slightly up at 327 from 309 previously 355.  AST down to 47 from 55 from prior 63.  ALT 45 down from 51 and prior 59 so those continue to drop.  White blood cell count 4.3 hemoglobin 14.9 platelet count 144 which is slightly lower from 162.  MCV 90.  Neutrophils normal at 1.4. Meld remains low at 6 and meld na 6.  Last mri in July and due for another one in Jan 2022.   July 10 MRI shows the lower thorax to have bibasilar atelectasis. Liver shows morphologic changes of chronic liver disease with nodular contour and lobar redistribution.  They do see changes in the liver parenchyma that are suggestive of fibrosis.  More confluent fibrosis seen in the right lobe. No definite suspicious liver lesions seen.  You do have some perfusion anomalies which need to be rechecked in 6 months.  Scattered hepatic cysts are seen. Gallbladder was unremarkable and mild intrahepatic bile duct dilation was seen also more conspicuous in the right lobe versus the left.  This is felt to be compatible with your PBC diagnosis.  No extrahepatic bile duct dilation seen. Spleen top normal at 12.9 cm with small splenule in the left upper quadrant. Pancreas normal. Renal cysts were seen. Colon diverticulosis was noted as well. No inflammation however was seen in the colon. Mild paraesophageal perigastric and perisplenic varices were seen and moderate aortobiiliac atherosclerosis seen. Overall they felt you had signs of chronic liver disease but no evidence of any malignancy.   June 9 laboratories show white blood cell count 4.3 hemoglobin 13.8 platelet 162.  These are stable for you.  Platelet count is actually better than it was before at 151.  Neutrophils are stable at 1.6 and previously were 1.5.  Total bilirubin is down to 0.7 from 0.8.  Alkaline phosphatase is lower at 309 from 355.  AST down to 55 from 63 ALT 51 down from 59 and prior to that 68.  So the liver labs continue to be dropping.  Sodium 139 potassium 4.6 chloride 102 CO2 26 BUN 17 creatinine 0.93 glucose 129.  Sugar is actually lower than the previous time at 146. INR 1.1.   Meld score low at 7 and meld na 7.  April 27: alb 4.3 and tb 1.1 and db 0.43 and alk 298 and ast 60 and alt 60.  March 2: alk 355 and ast 63 and alt 59 so in fact alk phos lower now to 298 and ast and alt about the same.  8 weeks off labs March 2 and inr 1.0 and tb 0.8 and alk 355 and down from 376 and prior 392. ast 63 and alt 59 and down from 65 and 68 so little lower.  ca 10.0. na 140 and k 5.0 and glu 146 elevated. bun 18 and cr 0.9. wbc 3.9 and hg 14.9 and plat 151 and mcv 90.   4 weeks off labs: jan 25 2021 and inr normal 1.0 and so little lower now. tb 0.8 and lower from 0.9 and alk 376 from 392 so lower and ast 65 and alt 68 and prior ast 60 and alt 68 so not much of a change seen. na 143 and k 4.6 and cl 103 and co2 23 and cr 0.93. glu 128 elevated and mentioned to him. defer to local md re your sugars. cr 0.93. wbc 4.1 hg 14.5 and plat 138 (down from 165) and mcv 89.  Jan 23 mri: Morphologic changes of chronic liver disease without  hepatocellular carcinoma seen so that is good to note. Patent portal venous system with stigmata portal hypertension, including upper abdominal varices and splenomegaly noted so need to stay on top of egd surveillance. Right renal cyst with area of complexity on prior ultrasound demonstrates no septation, enhancement, or nodularity on this examination, compatible  with a simple cyst ( 4.7 x 6.2 x 7.8 cm.) This may have represented artifact on prior ultrasound. Continued attention on follow-up for liver disease was recommended. Liver showed no fat or iron. Scattered simple cysts in the left hepatic  lobe. Periesophageal, perigastric, perisplenic varices seen. Mild intrahepatic duct dilatation peripherally extending to the capsule, greater in the right hepatic lobe, compatible with primary biliary cirrhosis. No extrahepatic biliary ductal dilatation. Normal gallbladder. Spleen was enlarged measuring 13.1 cm in the craniocaudal dimension.Adjacent splenule. Pancreas normal. Mild atherosclerotic disease abdominal aorta without aneurysm. Mild degenerative changes of the spine.  Document Type: MRI Abdomen w/ + w/o Contrast Document Date: January 23, 2021 09:35 Document Status: Auth (Verified) Document Title: MRI Abdomen w/ + w/o Contrast Performed By: Jason Kapadia Verified By: Jason Kapadia on January 25, 2021 07:35 Encounter info: [id number], [facility], Single Visit OP, 1/23/2021 - 1/23/2021 * Final Report * Reason For Exam PBC//HEPATIC FIBROSIS//DIABETES//FATTY LIVER//RENAL CYST//ELEVATED ALKALINE PHOSPHATASE LEVEL REPORT EXAM: MRI Abdomen w/ + w/o Contrast CLINICAL INDICATION: PBC//HEPATIC FIBROSIS//DIABETES//FATTY LIVER//RENAL CYST//ELEVATED ALKALINE PHOSPHATASE LEVEL. TECHNIQUE: Multisequence, multiplanar MRI of the abdomen was performed without and with intravenous contrast. ESRC.2.7.3 CONTRAST: 16 cc of Prohance COMPARISON: Outside MRI dated 9/1/2020. Abdominal ultrasound dated 8/10/2020. FINDINGS: Lower Thorax: Normal. Liver: No fat or iron. Morphologic changes of chronic liver disease, including lobar redistribution and nodular contour. Delayed reticular enhancement of the hepatic parenchyma, compatible with fibrosis. More confluent fibrosis in the right hepatic lobe. Scattered simple cysts in the left hepatic lobe. No hepatocellular carcinoma. Hepatic vasculature is patent. Recannulized paraumbilical vein. Periesophageal, perigastric, perisplenic varices. Gallbladder/Biliary Tree: Mild intrahepatic duct dilatation peripherally extending to the capsule, greater in the right hepatic lobe, compatible primary biliary cirrhosis. No extrahepatic biliary ductal dilatation. Normal gallbladder. Spleen: Enlarged measuring 13.1 cm in the craniocaudal dimension. Adjacent splenule. Pancreas: Normal. Adrenal Glands: Normal. Kidneys/Ureters: Renal cysts. Right renal cyst with area of complexity on prior ultrasound demonstrates no septation, enhancement, or nodularity on this examination and measures 4.7 x 6.2 x 7.8 cm. Gastrointestinal: No bowel obstruction. Lymph Nodes: Normal. Vessels: Mild atherosclerotic disease abdominal aorta without aneurysm. Peritoneum/Retroperitoneum: Normal. Bones/Soft Tissues: Mild degenerative changes of the spine. IMPRESSION: 1. Morphologic changes of chronic liver disease without hepatocellular carcinoma. 2. Patent portal venous system with stigmata portal hypertension, including upper abdominal varices and splenomegaly. 3. Right renal cyst with area of complexity on prior ultrasound demonstrates no septation, enhancement, or nodularity on this examination, compatible with a simple cyst. This may have represented artifact on prior ultrasound. Continued attention on follow-up for liver disease. Signature Line *** Final ***  Electronically Signed By: Jason Kapadia on 01/25/2021 07:35 Dictated by: Jason Kapadia  2 weeks off ocaliva: Sanju 15: glu 117 elevated some but similar to nov 117 but down from 204 in dec 30. bun 16 and cr 0.91. na 141 and k 4.7 and cl 102 and ca 10.1 and alb 4.2 and tb 0.9 down from 1.1 in nov. ast 60 and alt 68 and so about the same as you can see vs other two labs and alk 392  about the same vs dec but little up from nov 354. wbc 4.1 hg 14.8 plat 165. inr 1.1 stable. So no dramatic changes seen yet that point.  Dec 30 2020 labs in middle: wbc 3.9 hg 15.1 plat 167. mcv 97. tb 0.9 down  from 1.1 and alk 399 slightly higher from 354. ast 64 and alt 66 and prior ast 63 and alt 60. na 139 and k 4.7 and cl 99 and co2 27. glu 204 elevated so that is newer issue as prior 117 and 134 so show to local md. bun 15 and cr 1.07 little up and prior 0.85 so little dry and could be linked to the sugars.  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  He is on urosodiol 500mg BID and had been on for years Ocaliva 10mg q other day due to pruritis and then off due to pruritis.  He weighs 183. Max dose 1100 to 1250 range 13-15mg/kg and we can ursodiol to 1.5 in the am and 1 in the evening. that may help and will avoid something more risky.  Itching much better on the gabapentin and off the ocaliva.  Nov 25 2020: wbc 4.2 hg 15 and plat 150 and mcv 90. Neutrophils 1.3 and prior 1.4 and lymphs 2.0 and prior 1.9. tb 1.1 and alk 354 and ast 63 and alt  60 and prior tb 0.9 and alk 343 and ast 55 and alt 61. So about the same. glu 117 and down from 134. bun 15 and cr 0.85 and na 139 and k 4.4 and cl 101 and co2 25. alb 4.2 normal.  Saw local mri: 9-1 20: nonenhancing renal cyst and no definite renal mass. Largest cyst right kidney 5.7cm. Not surprisingly they thought liver appeared to be cirrhotic. Also apparent nonenhancing cysts in the liver up to 10.6mm. Nonspecific biliary dilation in liver. Extrahepatic duct appears decompressed. Nonspecific splenomegaly seen and no significant varices seen. Left adrenal suspected adenoma. Appendix seemed somewhat prominent to then at 6.4mm but was probably similar to prior per them. No definite gallstones. Prior [place] imaging liver coarsened. They recommended mri to better see possible complex renal cyst.  Saw urologist re the kidney issue was stable. They were to see him again in feb 2021 and had been changed.  The patient relates no significant family or personal history of liver disease. He states no history of new medications or alcohol use. The  patient reports a personal history of no other habits that could cause liver damage.  July 2020: tsh very low and show local md. T4 normal 8.5. inr 1.1 and tb 0.9 and alk 343 and ast 55 and alt 61 and tb 0.9 and alb 4.2 and ca 10.6 elevated and show local md also. na 144 and k 4.8. glu 134 elevated and maybe not fasting. cr 0.83. wbc 4.1 hg 15.3 plat 170.  Prior April ast 67 and alt 66 and alk 362 and tb 0.9 so liver labs little lower this last time despite the low dose and alk little lower also. calcium prior 10.1 and is not on any calcium supplements.  He says local doctor following thyroid and says he is doing better.  4- wbc 4.0 and hg 14.3 and plat 155 and neutrophils 1.2 little low. glu 130 and cr 0.78 and na 140 and k 4.4 and cl 101 and co2 22 and alb 4.3 and tb 0.9 and alk 362 and ast 67 and alt 66 and inr 1.1.  2-17-20 and wbc 4.1 hg 14.5 plat 153 and neutrophils 1.3 and glu 143 and cr 0.96 and na 140 and k 4.3 and cl 100 and co2 25 and alb 4.0 and tb 0.8 and alk 352 and ast 66 and alt 61 and inr 1.0.  12-16-19 and wbc 3.6 and hg 14.3 and plat 184 and neutrophils 1.3 and glu 124 and cr 0.86 and na 142 and k 5.0 and cl 102 and cl2 25 and alb 4.3 and tb 0.9 and alk 353 and ast 70 and alt 67 and inr 1.0.  Document Type: US Abdomen Doppler Complete Document Date: August 10, 2020 10:04 Document Status: Auth (Verified) Document Title: US Abdomen Doppler Complete Performed By: Jack Martin Verified By: Candelaria Rodriguez on August 10, 2020 11:41 Encounter info: [id number], [facility], Single Visit OP, 8/10/2020 - * Final Report * Reason For Exam primary billary cholangitis REPORT EXAM: US Abdomen Doppler Complete, US Abdomen Complete CLINICAL INDICATION: Primary billary cholangitis. TECHNIQUE: Grayscale, pulsed wave and color Doppler sonography of the upper abdomen were performed. ESRC.3.7.1 COMPARISON: None FINDINGS: Liver: Coarsened echotexture. No lesions. Bile Ducts: No dilated intrahepatic biliary radicles. Common duct measures 4 mm. Gallbladder: Normal. Wick's sign is negative. Pancreas: Partially obscured by overlying structures. Right Kidney: Measures 11.4 cm. Normal echogenicity. No hydronephrosis. 6.4 x 5.9 x 5.3 cm inferior pole cystic lesion with internal thickened septation versus areas of nodularity, indeterminate. Hyperechoic nonshadowing cortical focus measuring 0.5 x 0.4 x 0.2 cm.. Nonshadowing hyperechoic focus near the corticomedullary junction measuring 1.1 x 0.8 x 0.6 cm. Left Kidney: Measures 10.6 cm. Normal echogenicity. No hydronephrosis. Spleen: Measures 12.2 cm. Aorta: Normal caliber where imaged. IVC: Normal proximally, not imaged distally. Hepatic Veins: Normal. Portal Veins: Hepatopetal flow. Velocity of 27 cm/s in the main portal vein, 25 cm/s in the right portal vein, and 20 cm/s in the left portal vein. Hepatic Arteries: Peak systolic velocity 115 cm/s. Resistive index 0.77. Other: None. IMPRESSION: 1. Complex cystic lesion within the right kidney with thickened septation/areas of nodularity. Recommend MRI for further evaluation to exclude enhancing/solid components. Nonshadowing echogenic foci in the right kidney may represent nonobstructing stones although underlying lesions cannot be excluded. This can be evaluated at the time of MRI. 2. Coarsened hepatic echotexture can be seen with hepatic steatosis or chronic liver disease. No intra or extrahepatic biliary ductal dilation. Patent hepatic vasculature. The images were reviewed and interpreted by Candelaria Rodriguez MD. Signature Line *** Final *** Electronically Signed By: Candelaria Rodriguez on 08/10/2020 11:41 Dictated by: Jack Martin  12- u/s coarse hepatic ehcotexure and consistent with cirrhosis and 1.2cm hepatic cyst. Patent vessels and right renal cysts up to 5.7cm and some nonobstructing right renal calculi. Spleen 12.3cm.  Oct 14 2019 wbc 3.8 and hg 14.4 plat 177 and neutrophils 1.3 little low and glu 123 and cr 0.74 and na 141 and k 4.5 and cl 100 and co2 25 and alb 4.3 and tb 0.9 and alk 336 ast 67 and alt 61. hep b immune 40.5  Sept 18 2019 na 140 and k 4.7 and cl 103 and glu 121 and bun 12 and cr 0.79 alb 3,8 and tb 0,9 and ca 9.6 and ast 49 and alt 48 and alk 334 and a1c 5.7 and chol 228 and trg 52 and hdl 86 and ldl 132 nd psa 0.01 and wbc 4.2 and hg 144 and plat 183.  June 19 2019 and liver midly coarse and 1cm hepatic cyst and stable and gb not distended and no stones and bile ducts not dilated and spleen stable 12.3cm abd right kdiney 11cm and several right kidney cysts up to 6.2cm. Saw stone sin the right kidneuy. No hydronephrosios. Vessels patent. No change vs 2018.  Dec 2018 u.s with liver appearing fatty and patent vessels. Right kidney cyst 6.1x4.7x5.3cm stable abd simple. Liver cyst 1.1x0.8x1.1.cm. Similar to prior scan.  April 2019 labs wbc 4.3 hg 14.7 plat 183 and glu 134 and cr 0.85 and na 141 k 5.1 and tb 1.0 and alk 446 and ast 85 and alt 91.  Feb 2019 alk 418 and ast 79 and alt 81.  Dec 2018 alk 440 and tb 0.9 and db 0.48 and ast 76 and alt 92. Liver 76% and bone 20% and intestine 4%.  11/26/2018: wbc 4.6, hgb 14.7, plts 175,000, gluc 130, vet 0.89, na 142, k 4.0, alb 4.4, frances 2.9, t.bili 1.0, , AST 75, ASLT 80,INR 1.0, TSH 2.26  9/04/2018: wbc 4.6, hgb 14.4 plts 166,000, gluc 130, creat 0.95, na 143, k 4.5, alb 4.3, frances 3.1, t.bili 0.8, , AST56, ALT 62, INR 1.0, TSH 2.17,  6/05/2018: HBsAb 4.5, HBsAg neg, HBcAb neg, HAV pos  6/20/2018: liver echogenic suggesting mild fatty infiltration, simple cyst 1.1 x 0.7 x 1.3,, gallbladder unremarkable, mpv patent, cbd 4mm, right kidney simple cyst 5.6 x 4.8 x 5.2 cm, echoegenic focus 1.3cm consider kidney stone.  His cholesterol is checked by Dr. Sachin Dimas. He says cholesterol has been ok.   Reminded pt re bone density and needs to be following locally.  11-13-19: spine normal and t score 0.1 and z score 0.1/ femur neck -0.9 and -0.4 and femur total -1.0 and -0.9. He shared with primary md. he will check with primary md to redo as been 2 yrs.   Plan: 1. Increase ursodol to 500mg 1.5am and 1 in the evening with food. 2. Redo the labs in 3m and 6m. 3. Pt will do the mri in July 2022. 4. Pt will see us in 6m. 5. See DR Vann for the egd. 6. I am concerned re toxicity fo ocaliva for the pt.   Stressed to pt the need for social distancing and strict handwashing and wearing a mask and to follow any other new or added CDC recommendations as this is an evolving target.  Duration of the visit was 30 min with 5 min of prep and 25 min by dox video with greater than 50% of the time spent on coordination of care and in reviewing chart with the pt.

## 2022-06-13 ENCOUNTER — LAB OUTSIDE AN ENCOUNTER (OUTPATIENT)
Dept: URBAN - METROPOLITAN AREA TELEHEALTH 2 | Facility: TELEHEALTH | Age: 82
End: 2022-06-13

## 2022-06-21 ENCOUNTER — TELEPHONE ENCOUNTER (OUTPATIENT)
Dept: URBAN - METROPOLITAN AREA CLINIC 92 | Facility: CLINIC | Age: 82
End: 2022-06-21

## 2022-06-21 LAB
ALBUMIN: 4
ALKALINE PHOSPHATASE: 319
ALT (SGPT): 46
AST (SGOT): 45
BILIRUBIN, DIRECT: 0.38
BILIRUBIN, TOTAL: 0.8
BUN/CREATININE RATIO: 18
BUN: 14
CARBON DIOXIDE, TOTAL: 27
CHLORIDE: 102
CREATININE: 0.79
EGFR: 89
GLUCOSE: 138
POTASSIUM: 4.6
PROTEIN, TOTAL: 6.9
SODIUM: 140

## 2022-06-21 NOTE — HPI-TODAY'S VISIT:
Dear Roel Trejo, June 20 labs showed sugar elevated at 138.  BUN of 14 creatinine 0.79 sodium 140 potassium 4.6 chloride 102 CO2 27. Albumin 4.0 bilirubin 0.8 down from 1.1.  Urine bilirubin 0.38 down from 0.43.  Alkaline phosphatase 319 from 298 so it went up slightly.  AST went down to 45 and ALT to 46 from previous AST 60 and ALT 60. Overall the liver labs and the AST and ALT are lower and the alk phos slightly higher. Dr Longoria

## 2022-07-01 ENCOUNTER — LAB OUTSIDE AN ENCOUNTER (OUTPATIENT)
Dept: URBAN - METROPOLITAN AREA TELEHEALTH 2 | Facility: TELEHEALTH | Age: 82
End: 2022-07-01

## 2022-07-11 ENCOUNTER — TELEPHONE ENCOUNTER (OUTPATIENT)
Dept: URBAN - METROPOLITAN AREA CLINIC 92 | Facility: CLINIC | Age: 82
End: 2022-07-11

## 2022-07-11 NOTE — HPI-TODAY'S VISIT:
Dear Roel Trejo, July 9 MRI sent in to me. Lower thorax shows minimal bibasilar atelectasis. Liver showed no significant fat or iron but does have chronic liver disease changes seen including lobar redistribution and nodular contour.  There are some reticular enhancement changes that are compatible with fibrosis.  No suspicious liver lesions seen.   Liver vessels are patent as expected. Small varices seen near the stomach and spleen.  Important to stay on top of egd surveillance for these issues. Recannulized periumbilical vein noted which is another sign of portal hypertension Spleen slightly enlarged at 13 cm. Pancreas and adrenal glands normal. Kidneys show some renal cysts and no further description was given. They did see your appendix and it appeared normal to them. They also saw some mild atherosclerotic disease of the abdominal aorta but without aneurysm.  Please share with primary providers to be aware of same. Degenerative changes seen of your spine. We may be able to look towards doing an ultrasound alternating with an MRI and we will discuss this further at your next visit. Dr. Longoria

## 2022-09-19 ENCOUNTER — LAB OUTSIDE AN ENCOUNTER (OUTPATIENT)
Dept: URBAN - METROPOLITAN AREA TELEHEALTH 2 | Facility: TELEHEALTH | Age: 82
End: 2022-09-19

## 2022-09-20 ENCOUNTER — TELEPHONE ENCOUNTER (OUTPATIENT)
Dept: URBAN - METROPOLITAN AREA CLINIC 92 | Facility: CLINIC | Age: 82
End: 2022-09-20

## 2022-09-20 LAB
A/G RATIO: 1.4
ALBUMIN: 4.2
ALKALINE PHOSPHATASE: 323
ALT (SGPT): 49
AST (SGOT): 57
BASO (ABSOLUTE): 0
BASOS: 1
BILIRUBIN, TOTAL: 0.6
BUN/CREATININE RATIO: 18
BUN: 12
CALCIUM: 9.7
CARBON DIOXIDE, TOTAL: 21
CHLORIDE: 103
CREATININE: 0.68
EGFR: 93
EOS (ABSOLUTE): 0.2
EOS: 6
GLOBULIN, TOTAL: 3.1
GLUCOSE: 148
HEMATOCRIT: 46.6
HEMATOLOGY COMMENTS:: (no result)
HEMOGLOBIN: 14.9
IMMATURE CELLS: (no result)
IMMATURE GRANS (ABS): 0
IMMATURE GRANULOCYTES: 0
INR: 1.1
LYMPHS (ABSOLUTE): 1.4
LYMPHS: 39
MCH: 29.3
MCHC: 32
MCV: 92
MONOCYTES(ABSOLUTE): 0.4
MONOCYTES: 11
NEUTROPHILS (ABSOLUTE): 1.6
NEUTROPHILS: 43
NRBC: (no result)
PLATELETS: 145
POTASSIUM: 4.2
PROTEIN, TOTAL: 7.3
PROTHROMBIN TIME: 11.1
RBC: 5.08
RDW: 12.7
SODIUM: 143
WBC: 3.6

## 2022-09-20 NOTE — HPI-TODAY'S VISIT:
Dear Roel Person, September 19 labs show white blood cell count 3.6 hemoglobin 14.9 platelet count 145 down from 164.  Normal is from 150 up to 450.  1 year ago and September 3, 2021 the platelet count was about the same at 144. Neutrophils 1.6 lymphocytes 1.4 both normal.  Sugar was slightly up at 148 from previous 137.  BUN of 12 Kren 0.68.  Sodium 143 potassium 4.2 albumin 4.2 bilirubin normal at 0.6 and down from 0.9. Alkaline phosphatase down from 404 to 323.  AST slightly lower at 57 from 58.  ALT down to 49 from 58.  So they are moving in the right direction. INR normal at 1.1. Meld 7 and meld na 7 so remains low. Dr Longoria

## 2022-09-28 ENCOUNTER — TELEPHONE ENCOUNTER (OUTPATIENT)
Dept: URBAN - METROPOLITAN AREA CLINIC 86 | Facility: CLINIC | Age: 82
End: 2022-09-28

## 2022-09-28 ENCOUNTER — OFFICE VISIT (OUTPATIENT)
Dept: URBAN - METROPOLITAN AREA TELEHEALTH 2 | Facility: TELEHEALTH | Age: 82
End: 2022-09-28
Payer: MEDICARE

## 2022-09-28 VITALS — WEIGHT: 178 LBS | HEIGHT: 65 IN | BODY MASS INDEX: 29.66 KG/M2

## 2022-09-28 DIAGNOSIS — M19.90 ARTHRITIS: ICD-10-CM

## 2022-09-28 DIAGNOSIS — N20.0 RENAL STONE: ICD-10-CM

## 2022-09-28 DIAGNOSIS — N28.1 RENAL CYST: ICD-10-CM

## 2022-09-28 DIAGNOSIS — K74.02 HEPATIC FIBROSIS, ADVANCED FIBROSIS: ICD-10-CM

## 2022-09-28 DIAGNOSIS — K76.6 PORTAL HYPERTENSION: ICD-10-CM

## 2022-09-28 DIAGNOSIS — E11.9 DIABETES: ICD-10-CM

## 2022-09-28 DIAGNOSIS — K76.0 FATTY LIVER: ICD-10-CM

## 2022-09-28 DIAGNOSIS — K74.3 PBC (PRIMARY BILIARY CIRRHOSIS): ICD-10-CM

## 2022-09-28 DIAGNOSIS — E66.3 OVERWEIGHT: ICD-10-CM

## 2022-09-28 DIAGNOSIS — L29.9 PRURITIC CONDITION: ICD-10-CM

## 2022-09-28 PROCEDURE — 99214 OFFICE O/P EST MOD 30 MIN: CPT

## 2022-09-28 RX ORDER — GLIPIZIDE 5 MG/1
0.5 TABLET 30 MINUTES BEFORE BREAKFAST TABLET ORAL ONCE A DAY
Status: ACTIVE | COMMUNITY

## 2022-09-28 RX ORDER — AMLODIPINE BESYLATE 5 MG/1
TAKE 1 TABLET (5 MG) BY ORAL ROUTE ONCE DAILY TABLET ORAL 1
Qty: 0 | Refills: 0 | Status: ACTIVE | COMMUNITY
Start: 1900-01-01

## 2022-09-28 RX ORDER — GABAPENTIN 300 MG/1
1 CAPSULE CAPSULE ORAL TWICE A DAY
Status: ACTIVE | COMMUNITY

## 2022-09-28 RX ORDER — URSODIOL 500 MG/1
TAKE 1.5 TABLET IN AM AND 1 IN PM (TAKE WITH FOOD) TABLET ORAL TWICE A DAY
Qty: 225 | Refills: 1

## 2022-09-28 RX ORDER — FAMOTIDINE 40 MG/1
1 TABLET AT BEDTIME TABLET, FILM COATED ORAL ONCE A DAY
Status: ACTIVE | COMMUNITY

## 2022-09-28 RX ORDER — MELOXICAM 15 MG/1
1 TABLET TABLET ORAL ONCE A DAY
Status: ACTIVE | COMMUNITY

## 2022-09-28 RX ORDER — URSODIOL 500 MG/1
TAKE 1.5 TABLET IN AM AND 1 IN PM (TAKE WITH FOOD) TABLET ORAL TWICE A DAY
Qty: 225 | Refills: 1 | Status: ACTIVE | COMMUNITY

## 2022-09-28 NOTE — HPI-TODAY'S VISIT:
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  September 19 labs show white blood cell count 3.6 hemoglobin 14.9 platelet count 145 down from 164.  Normal is from 150 up to 450.  1 year ago and September 3, 2021 the platelet count was about the same at 144. Neutrophils 1.6 lymphocytes 1.4 both normal.  Sugar was slightly up at 148 from previous 137.  BUN of 12 Cr 0.68.  Sodium 143 potassium 4.2 albumin 4.2 bilirubin normal at 0.6 and down from 0.9. Alkaline phosphatase down from 404 to 323.  AST slightly lower at 57 from 58.  ALT down to 49 from 58.  So they are moving in the right direction. INR normal at 1.1. Meld 7 and meld na 7 so remains low.  June 20 labs showed sugar elevated at 138.  BUN of 14 creatinine 0.79 sodium 140 potassium 4.6 chloride 102 CO2 27. Albumin 4.0 bilirubin 0.8 down from 1.1.  Urine bilirubin 0.38 down from 0.43.  Alkaline phosphatase 319 from 298 so it went up slightly.  AST went down to 45 and ALT to 46 from previous AST 60 and ALT 60. Overall the liver labs and the AST and ALT are lower and the alk phos slightly higher. Dr Longoria   July 9 MRI sent in to me. Lower thorax shows minimal bibasilar atelectasis. Liver showed no significant fat or iron but does have chronic liver disease changes seen including lobar redistribution and nodular contour.  There are some reticular enhancement changes that are compatible with fibrosis.  No suspicious liver lesions seen.   Liver vessels are patent as expected. Small varices seen near the stomach and spleen.  Important to stay on top of egd surveillance for these issues. Recannulized periumbilical vein noted which is another sign of portal hypertension Spleen slightly enlarged at 13 cm. Pancreas and adrenal glands normal. Kidneys show some renal cysts and no further description was given. They did see your appendix and it appeared normal to them. They also saw some mild atherosclerotic disease of the abdominal aorta but without aneurysm.  Please share with primary providers to be aware of same. Degenerative changes seen of your spine. We may be able to look towards doing an ultrasound alternating with an MRI and we will discuss this further at your next visit.   March 15 labs show glucose elevated at 137 previously 143 and please share with primary provider.  BUN of 14 creatinine 0.86 sodium 139 potassium 4.8 calcium 10.0 albumin 4.2 bilirubin 0.9.  These other labs are normal range. Alkaline phosphatase did go up to 404 from previous 394 and prior 327.  AST came down from 63 to 58.  ALT came down from 68 to 58. Hill cell count 4.1 hemoglobin 14.9 platelet count 164 normal.  MCV 89. Neutrophils 1.4 and lymphocytes 1.9.  Weight is stable.  New medicines may be coming next year coming.  Ocaliva was on it had toxicity risk and not much lab benefit and being cirrhotic is higher risk and he is  getting older.  January 29 2022 MRI Lower thorax showed bibasilar atelectasis which means that the bases of the lungs were not fully expanded.  So metimes we can see that when you are in the MRI machine in that fixed position for a while.  Please share with primary provider. Liver showed no fat or iron but did have chronic liver disease changes including a nodular contour and lobar redistribution.  Some fibrosis changes seen.  No suspicious lesions.  Small left lobe hepatic cyst seen. Gallbladder normal with some unchanged mild intrahepatic bile duct dilation most pronounced in the periphery. Spleen was top normal in size at 13 cm. Pancreas, adrenal glands, and lymph nodes were normal. Stable renal cyst seen bilaterally. Mild atherosclerosis of the aorta seen without aneurysm.  Small varices near the esophagus and stomach so would need to stay on top of that EGD surveillance. Mild degenerative changes seen of the spine.  He did a scope with Dr Gabby Vann and take anticid. She did not mention any varices. Need that report.  He did the covid 19 series and March 5 2021. He did the covid booster.  Meds off ocaliva for summer 2020.   November 30 labs show INR normal at 1.0 which is good to see.  Glucose currently 154 elevated and previously was 143 and prior to that 129.  All 3 were elevated.  We have discussed this previously.  Please share with primary providers per their review. BUN of 13 creatinine 0.84 sodium 142 potassium 4.8 chloride 102 CO2 of 27 calcium elevated at 10.3. Asked if he is on any calcium supplements?  he said not taking any that he knows. Previously was normal at 10.2 and prior 10.1.  Please share with primary provider also.  Albumin 4.5 bilirubin 1.0 which is normal.  Alkaline phosphatase elevated at 394 previously 327 and prior 309.  AST 63 and ALT 68 which appeared to be slightly higher from prior AST of 47 ALT 45. Asked if he had any new meds and he says he is a vit d supplement. White blood cell count 4 hemoglobin 14.8 platelet count slightly low at 146 but improved from last time at 144.  MCV normal at 90.  Neutrophils 1.5 and lymphocytes 1.7. Meld low at 6 and meld na 6.    September 3 labs show INR remains perfectly normal at 1.0 and was previously 1.1. Glucose still remains elevated at 143 previously 129 and share with primary provider.  BUN of 13 creatinine 0.84 sodium 140 potassium 4.5 calcium 10.2 albumin 4.1 bilirubin 1.0.  Previously bilirubin 0.7 but remains normal again at 1.0.  Alkaline phosphatase slightly up at 327 from 309 previously 355.  AST down to 47 from 55 from prior 63.  ALT 45 down from 51 and prior 59 so those continue to drop.  White blood cell count 4.3 hemoglobin 14.9 platelet count 144 which is slightly lower from 162.  MCV 90.  Neutrophils normal at 1.4. Meld remains low at 6 and meld na 6.  Last mri in July and due for another one in Jan 2022.   July 10 MRI shows the lower thorax to have bibasilar atelectasis. Liver shows morphologic changes of chronic liver disease with nodular contour and lobar redistribution.  They do see changes in the liver parenchyma that are suggestive of fibrosis.  More confluent fibrosis seen in the right lobe. No definite suspicious liver lesions seen.  You do have some perfusion anomalies which need to be rechecked in 6 months.  Scattered hepatic cysts are seen. Gallbladder was unremarkable and mild intrahepatic bile duct dilation was seen also more conspicuous in the right lobe versus the left.  This is felt to be compatible with your PBC diagnosis.  No extrahepatic bile duct dilation seen. Spleen top normal at 12.9 cm with small splenule in the left upper quadrant. Pancreas normal. Renal cysts were seen. Colon diverticulosis was noted as well. No inflammation however was seen in the colon. Mild paraesophageal perigastric and perisplenic varices were seen and moderate aortobiiliac atherosclerosis seen. Overall they felt you had signs of chronic liver disease but no evidence of any malignancy.   June 9 laboratories show white blood cell count 4.3 hemoglobin 13.8 platelet 162.  These are stable for you.  Platelet count is actually better than it was before at 151.  Neutrophils are stable at 1.6 and previously were 1.5.  Total bilirubin is down to 0.7 from 0.8.  Alkaline phosphatase is lower at 309 from 355.  AST down to 55 from 63 ALT 51 down from 59 and prior to that 68.  So the liver labs continue to be dropping.  Sodium 139 potassium 4.6 chloride 102 CO2 26 BUN 17 creatinine 0.93 glucose 129.  Sugar is actually lower than the previous time at 146. INR 1.1.   Meld score low at 7 and meld na 7.  April 27: alb 4.3 and tb 1.1 and db 0.43 and alk 298 and ast 60 and alt 60.  March 2: alk 355 and ast 63 and alt 59 so in fact alk phos lower now to 298 and ast and alt about the same.  8 weeks off labs March 2 and inr 1.0 and tb 0.8 and alk 355 and down from 376 and prior 392. ast 63 and alt 59 and down from 65 and 68 so little lower.  ca 10.0. na 140 and k 5.0 and glu 146 elevated. bun 18 and cr 0.9. wbc 3.9 and hg 14.9 and plat 151 and mcv 90.   4 weeks off labs: jan 25 2021 and inr normal 1.0 and so little lower now. tb 0.8 and lower from 0.9 and alk 376 from 392 so lower and ast 65 and alt 68 and prior ast 60 and alt 68 so not much of a change seen. na 143 and k 4.6 and cl 103 and co2 23 and cr 0.93. glu 128 elevated and mentioned to him. defer to local md re your sugars. cr 0.93. wbc 4.1 hg 14.5 and plat 138 (down from 165) and mcv 89.  Jan 23 mri: Morphologic changes of chronic liver disease without  hepatocellular carcinoma seen so that is good to note. Patent portal venous system with stigmata portal hypertension, including upper abdominal varices and splenomegaly noted so need to stay on top of egd surveillance. Right renal cyst with area of complexity on prior ultrasound demonstrates no septation, enhancement, or nodularity on this examination, compatible  with a simple cyst ( 4.7 x 6.2 x 7.8 cm.) This may have represented artifact on prior ultrasound. Continued attention on follow-up for liver disease was recommended. Liver showed no fat or iron. Scattered simple cysts in the left hepatic  lobe. Periesophageal, perigastric, perisplenic varices seen. Mild intrahepatic duct dilatation peripherally extending to the capsule, greater in the right hepatic lobe, compatible with primary biliary cirrhosis. No extrahepatic biliary ductal dilatation. Normal gallbladder. Spleen was enlarged measuring 13.1 cm in the craniocaudal dimension.Adjacent splenule. Pancreas normal. Mild atherosclerotic disease abdominal aorta without aneurysm. Mild degenerative changes of the spine.  Document Type: MRI Abdomen w/ + w/o Contrast Document Date: January 23, 2021 09:35 Document Status: Auth (Verified) Document Title: MRI Abdomen w/ + w/o Contrast Performed By: Jason Kapadia Verified By: Jason Kapadia on January 25, 2021 07:35 Encounter info: [id number], [facility], Single Visit OP, 1/23/2021 - 1/23/2021 * Final Report * Reason For Exam PBC//HEPATIC FIBROSIS//DIABETES//FATTY LIVER//RENAL CYST//ELEVATED ALKALINE PHOSPHATASE LEVEL REPORT EXAM: MRI Abdomen w/ + w/o Contrast CLINICAL INDICATION: PBC//HEPATIC FIBROSIS//DIABETES//FATTY LIVER//RENAL CYST//ELEVATED ALKALINE PHOSPHATASE LEVEL. TECHNIQUE: Multisequence, multiplanar MRI of the abdomen was performed without and with intravenous contrast. ESRC.2.7.3 CONTRAST: 16 cc of Prohance COMPARISON: Outside MRI dated 9/1/2020. Abdominal ultrasound dated 8/10/2020. FINDINGS: Lower Thorax: Normal. Liver: No fat or iron. Morphologic changes of chronic liver disease, including lobar redistribution and nodular contour. Delayed reticular enhancement of the hepatic parenchyma, compatible with fibrosis. More confluent fibrosis in the right hepatic lobe. Scattered simple cysts in the left hepatic lobe. No hepatocellular carcinoma. Hepatic vasculature is patent. Recannulized paraumbilical vein. Periesophageal, perigastric, perisplenic varices. Gallbladder/Biliary Tree: Mild intrahepatic duct dilatation peripherally extending to the capsule, greater in the right hepatic lobe, compatible primary biliary cirrhosis. No extrahepatic biliary ductal dilatation. Normal gallbladder. Spleen: Enlarged measuring 13.1 cm in the craniocaudal dimension. Adjacent splenule. Pancreas: Normal. Adrenal Glands: Normal. Kidneys/Ureters: Renal cysts. Right renal cyst with area of complexity on prior ultrasound demonstrates no septation, enhancement, or nodularity on this examination and measures 4.7 x 6.2 x 7.8 cm. Gastrointestinal: No bowel obstruction. Lymph Nodes: Normal. Vessels: Mild atherosclerotic disease abdominal aorta without aneurysm. Peritoneum/Retroperitoneum: Normal. Bones/Soft Tissues: Mild degenerative changes of the spine. IMPRESSION: 1. Morphologic changes of chronic liver disease without hepatocellular carcinoma. 2. Patent portal venous system with stigmata portal hypertension, including upper abdominal varices and splenomegaly. 3. Right renal cyst with area of complexity on prior ultrasound demonstrates no septation, enhancement, or nodularity on this examination, compatible with a simple cyst. This may have represented artifact on prior ultrasound. Continued attention on follow-up for liver disease. Signature Line *** Final ***  Electronically Signed By: Jason Kapadia on 01/25/2021 07:35 Dictated by: Jason Kapadia  2 weeks off ocaliva: Sanju 15: glu 117 elevated some but similar to nov 117 but down from 204 in dec 30. bun 16 and cr 0.91. na 141 and k 4.7 and cl 102 and ca 10.1 and alb 4.2 and tb 0.9 down from 1.1 in nov. ast 60 and alt 68 and so about the same as you can see vs other two labs and alk 392  about the same vs dec but little up from nov 354. wbc 4.1 hg 14.8 plat 165. inr 1.1 stable. So no dramatic changes seen yet that point.  Dec 30 2020 labs in middle: wbc 3.9 hg 15.1 plat 167. mcv 97. tb 0.9 down  from 1.1 and alk 399 slightly higher from 354. ast 64 and alt 66 and prior ast 63 and alt 60. na 139 and k 4.7 and cl 99 and co2 27. glu 204 elevated so that is newer issue as prior 117 and 134 so show to local md. bun 15 and cr 1.07 little up and prior 0.85 so little dry and could be linked to the sugars.  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  He is on urosodiol 500mg BID and had been on for years Ocaliva 10mg q other day due to pruritis and then off due to pruritis.  He weighs 183. Max dose 1100 to 1250 range 13-15mg/kg and we can ursodiol to 1.5 in the am and 1 in the evening. that may help and will avoid something more risky.  Itching much better on the gabapentin and off the ocaliva.  Nov 25 2020: wbc 4.2 hg 15 and plat 150 and mcv 90. Neutrophils 1.3 and prior 1.4 and lymphs 2.0 and prior 1.9. tb 1.1 and alk 354 and ast 63 and alt  60 and prior tb 0.9 and alk 343 and ast 55 and alt 61. So about the same. glu 117 and down from 134. bun 15 and cr 0.85 and na 139 and k 4.4 and cl 101 and co2 25. alb 4.2 normal.  Saw local mri: 9-1 20: nonenhancing renal cyst and no definite renal mass. Largest cyst right kidney 5.7cm. Not surprisingly they thought liver appeared to be cirrhotic. Also apparent nonenhancing cysts in the liver up to 10.6mm. Nonspecific biliary dilation in liver. Extrahepatic duct appears decompressed. Nonspecific splenomegaly seen and no significant varices seen. Left adrenal suspected adenoma. Appendix seemed somewhat prominent to then at 6.4mm but was probably similar to prior per them. No definite gallstones. Prior [facility] imaging liver coarsened. They recommended mri to better see possible complex renal cyst.  Saw urologist re the kidney issue was stable. They were to see him again in feb 2021 and had been changed.  The patient relates no significant family or personal history of liver disease. He states no history of new medications or alcohol use. The  patient reports a personal history of no other habits that could cause liver damage.  July 2020: tsh very low and show local md. T4 normal 8.5. inr 1.1 and tb 0.9 and alk 343 and ast 55 and alt 61 and tb 0.9 and alb 4.2 and ca 10.6 elevated and show local md also. na 144 and k 4.8. glu 134 elevated and maybe not fasting. cr 0.83. wbc 4.1 hg 15.3 plat 170.  Prior April ast 67 and alt 66 and alk 362 and tb 0.9 so liver labs little lower this last time despite the low dose and alk little lower also. calcium prior 10.1 and is not on any calcium supplements.  He says local doctor following thyroid and says he is doing better.  4- wbc 4.0 and hg 14.3 and plat 155 and neutrophils 1.2 little low. glu 130 and cr 0.78 and na 140 and k 4.4 and cl 101 and co2 22 and alb 4.3 and tb 0.9 and alk 362 and ast 67 and alt 66 and inr 1.1.  2-17-20 and wbc 4.1 hg 14.5 plat 153 and neutrophils 1.3 and glu 143 and cr 0.96 and na 140 and k 4.3 and cl 100 and co2 25 and alb 4.0 and tb 0.8 and alk 352 and ast 66 and alt 61 and inr 1.0.  12-16-19 and wbc 3.6 and hg 14.3 and plat 184 and neutrophils 1.3 and glu 124 and cr 0.86 and na 142 and k 5.0 and cl 102 and cl2 25 and alb 4.3 and tb 0.9 and alk 353 and ast 70 and alt 67 and inr 1.0.  Document Type: US Abdomen Doppler Complete Document Date: August 10, 2020 10:04 Document Status: Auth (Verified) Document Title: US Abdomen Doppler Complete Performed By: Jack Martin Verified By: Candelaria Rodriguez on August 10, 2020 11:41 Encounter info: [id number], [facility], Single Visit OP, 8/10/2020 - * Final Report * Reason For Exam primary billary cholangitis REPORT EXAM: US Abdomen Doppler Complete, US Abdomen Complete CLINICAL INDICATION: Primary billary cholangitis. TECHNIQUE: Grayscale, pulsed wave and color Doppler sonography of the upper abdomen were performed. ESRC.3.7.1 COMPARISON: None FINDINGS: Liver: Coarsened echotexture. No lesions. Bile Ducts: No dilated intrahepatic biliary radicles. Common duct measures 4 mm. Gallbladder: Normal. Wick's sign is negative. Pancreas: Partially obscured by overlying structures. Right Kidney: Measures 11.4 cm. Normal echogenicity. No hydronephrosis. 6.4 x 5.9 x 5.3 cm inferior pole cystic lesion with internal thickened septation versus areas of nodularity, indeterminate. Hyperechoic nonshadowing cortical focus measuring 0.5 x 0.4 x 0.2 cm.. Nonshadowing hyperechoic focus near the corticomedullary junction measuring 1.1 x 0.8 x 0.6 cm. Left Kidney: Measures 10.6 cm. Normal echogenicity. No hydronephrosis. Spleen: Measures 12.2 cm. Aorta: Normal caliber where imaged. IVC: Normal proximally, not imaged distally. Hepatic Veins: Normal. Portal Veins: Hepatopetal flow. Velocity of 27 cm/s in the main portal vein, 25 cm/s in the right portal vein, and 20 cm/s in the left portal vein. Hepatic Arteries: Peak systolic velocity 115 cm/s. Resistive index 0.77. Other: None. IMPRESSION: 1. Complex cystic lesion within the right kidney with thickened septation/areas of nodularity. Recommend MRI for further evaluation to exclude enhancing/solid components. Nonshadowing echogenic foci in the right kidney may represent nonobstructing stones although underlying lesions cannot be excluded. This can be evaluated at the time of MRI. 2. Coarsened hepatic echotexture can be seen with hepatic steatosis or chronic liver disease. No intra or extrahepatic biliary ductal dilation. Patent hepatic vasculature. The images were reviewed and interpreted by Candelaria Rodriguez MD. Signature Line *** Final *** Electronically Signed By: Candelaria Rodriguez on 08/10/2020 11:41 Dictated by: Jack Martin  12- u/s coarse hepatic ehcotexure and consistent with cirrhosis and 1.2cm hepatic cyst. Patent vessels and right renal cysts up to 5.7cm and some nonobstructing right renal calculi. Spleen 12.3cm.  Oct 14 2019 wbc 3.8 and hg 14.4 plat 177 and neutrophils 1.3 little low and glu 123 and cr 0.74 and na 141 and k 4.5 and cl 100 and co2 25 and alb 4.3 and tb 0.9 and alk 336 ast 67 and alt 61. hep b immune 40.5  Sept 18 2019 na 140 and k 4.7 and cl 103 and glu 121 and bun 12 and cr 0.79 alb 3,8 and tb 0,9 and ca 9.6 and ast 49 and alt 48 and alk 334 and a1c 5.7 and chol 228 and trg 52 and hdl 86 and ldl 132 nd psa 0.01 and wbc 4.2 and hg 144 and plat 183.  June 19 2019 and liver midly coarse and 1cm hepatic cyst and stable and gb not distended and no stones and bile ducts not dilated and spleen stable 12.3cm abd right kdiney 11cm and several right kidney cysts up to 6.2cm. Saw stone sin the right kidneuy. No hydronephrosios. Vessels patent. No change vs 2018.  Dec 2018 u.s with liver appearing fatty and patent vessels. Right kidney cyst 6.1x4.7x5.3cm stable abd simple. Liver cyst 1.1x0.8x1.1.cm. Similar to prior scan.  April 2019 labs wbc 4.3 hg 14.7 plat 183 and glu 134 and cr 0.85 and na 141 k 5.1 and tb 1.0 and alk 446 and ast 85 and alt 91.  Feb 2019 alk 418 and ast 79 and alt 81.  Dec 2018 alk 440 and tb 0.9 and db 0.48 and ast 76 and alt 92. Liver 76% and bone 20% and intestine 4%.  11/26/2018: wbc 4.6, hgb 14.7, plts 175,000, gluc 130, vet 0.89, na 142, k 4.0, alb 4.4, frances 2.9, t.bili 1.0, , AST 75, ASLT 80,INR 1.0, TSH 2.26  9/04/2018: wbc 4.6, hgb 14.4 plts 166,000, gluc 130, creat 0.95, na 143, k 4.5, alb 4.3, frances 3.1, t.bili 0.8, , AST56, ALT 62, INR 1.0, TSH 2.17,  6/05/2018: HBsAb 4.5, HBsAg neg, HBcAb neg, HAV pos  6/20/2018: liver echogenic suggesting mild fatty infiltration, simple cyst 1.1 x 0.7 x 1.3,, gallbladder unremarkable, mpv patent, cbd 4mm, right kidney simple cyst 5.6 x 4.8 x 5.2 cm, echoegenic focus 1.3cm consider kidney stone.  His cholesterol is checked by Dr. Sachin Dimas. He says cholesterol has been ok.   Reminded pt re bone density and needs to be following locally and he has not done this and reminded to get with local provider.  11-13-19: spine normal and t score 0.1 and z score 0.1/ femur neck -0.9 and -0.4 and femur total -1.0 and -0.9. He shared with primary md. he will check with primary md to redo as been 2 yrs.   Plan: 1. Stay  ursodol to 500mg 1.5am and 1 in the evening with food. 2. Redo the labs in 3m . 3, U/s Sanju here same day of the visit. 4. See in person. 5. He will send the egd info.   Stressed to pt the need for social distancing and strict handwashing and wearing a mask and to follow any other new or added CDC recommendations as this is an evolving target.  Duration of the visit was 30 min with 10 min of prep and 20 min from 107 to 127 pm by clock as healow video fluxed during visit with greater than 50% of the time spent on coordination of care and in reviewing chart with the pt.

## 2022-09-30 ENCOUNTER — TELEPHONE ENCOUNTER (OUTPATIENT)
Dept: URBAN - METROPOLITAN AREA CLINIC 86 | Facility: CLINIC | Age: 82
End: 2022-09-30

## 2022-10-03 ENCOUNTER — TELEPHONE ENCOUNTER (OUTPATIENT)
Dept: URBAN - METROPOLITAN AREA CLINIC 86 | Facility: CLINIC | Age: 82
End: 2022-10-03

## 2022-10-14 ENCOUNTER — TELEPHONE ENCOUNTER (OUTPATIENT)
Dept: URBAN - METROPOLITAN AREA CLINIC 86 | Facility: CLINIC | Age: 82
End: 2022-10-14

## 2022-12-22 ENCOUNTER — TELEPHONE ENCOUNTER (OUTPATIENT)
Dept: URBAN - METROPOLITAN AREA CLINIC 92 | Facility: CLINIC | Age: 82
End: 2022-12-22

## 2022-12-22 LAB
A/G RATIO: 1.3
ALBUMIN: 4.4
ALKALINE PHOSPHATASE: 423
ALT (SGPT): 70
AST (SGOT): 72
BASO (ABSOLUTE): 0
BASOS: 1
BILIRUBIN, TOTAL: 1.1
BUN/CREATININE RATIO: 19
BUN: 17
CALCIUM: 10.2
CARBON DIOXIDE, TOTAL: 25
CHLORIDE: 101
CREATININE: 0.89
EGFR: 86
EOS (ABSOLUTE): 0.1
EOS: 3
GLOBULIN, TOTAL: 3.3
GLUCOSE: 131
HEMATOCRIT: 44.6
HEMATOLOGY COMMENTS:: (no result)
HEMOGLOBIN: 14.6
IMMATURE CELLS: (no result)
IMMATURE GRANS (ABS): 0
IMMATURE GRANULOCYTES: 0
INR: 1
LYMPHS (ABSOLUTE): 1.3
LYMPHS: 32
MCH: 29.4
MCHC: 32.7
MCV: 90
MONOCYTES(ABSOLUTE): 0.5
MONOCYTES: 11
NEUTROPHILS (ABSOLUTE): 2.2
NEUTROPHILS: 53
NRBC: (no result)
PLATELETS: 151
POTASSIUM: 4.8
PROTEIN, TOTAL: 7.7
PROTHROMBIN TIME: 10.7
RBC: 4.97
RDW: 12.7
SODIUM: 141
WBC: 4.1

## 2022-12-22 NOTE — HPI-TODAY'S VISIT:
Dear Roel Person, December 21 labs show INR normal at 1.0 which is good to see.  Is actually lower than back in September when it was 1.1. Sugar was elevated at 131 and as we mentioned before that may be related to you doing the labs not fasting.  Please share with primary providers. BUN of 17 creatinine 0.89 sodium 141 potassium 4.8 calcium 10.2 albumin 4.4 bilirubin 1.1 alkaline phosphatase went up a little to 423 from 323.  Any recent issues?  AST was 72 ALT of 70 up from 57 and 49. White blood cell count 4.1 hemoglobin 14.6 platelet count 151 MCV 98 neutrophils 2.2 lymphocytes 1.3. Called pt: took a medicine for his knees: meloxicam he took and has 8% risk to raise the labs. So that is what did that.  He will stop this. Dr Longoria

## 2022-12-26 ENCOUNTER — LAB OUTSIDE AN ENCOUNTER (OUTPATIENT)
Dept: URBAN - METROPOLITAN AREA TELEHEALTH 2 | Facility: TELEHEALTH | Age: 82
End: 2022-12-26

## 2023-01-09 ENCOUNTER — OFFICE VISIT (OUTPATIENT)
Dept: URBAN - METROPOLITAN AREA CLINIC 86 | Facility: CLINIC | Age: 83
End: 2023-01-09

## 2023-01-09 ENCOUNTER — WEB ENCOUNTER (OUTPATIENT)
Dept: URBAN - METROPOLITAN AREA CLINIC 86 | Facility: CLINIC | Age: 83
End: 2023-01-09

## 2023-01-09 ENCOUNTER — LAB OUTSIDE AN ENCOUNTER (OUTPATIENT)
Dept: URBAN - METROPOLITAN AREA TELEHEALTH 2 | Facility: TELEHEALTH | Age: 83
End: 2023-01-09

## 2023-01-09 ENCOUNTER — TELEPHONE ENCOUNTER (OUTPATIENT)
Dept: URBAN - METROPOLITAN AREA CLINIC 92 | Facility: CLINIC | Age: 83
End: 2023-01-09

## 2023-01-09 ENCOUNTER — LAB OUTSIDE AN ENCOUNTER (OUTPATIENT)
Dept: URBAN - METROPOLITAN AREA CLINIC 86 | Facility: CLINIC | Age: 83
End: 2023-01-09

## 2023-01-09 ENCOUNTER — OFFICE VISIT (OUTPATIENT)
Dept: URBAN - METROPOLITAN AREA CLINIC 86 | Facility: CLINIC | Age: 83
End: 2023-01-09
Payer: MEDICARE

## 2023-01-09 VITALS
DIASTOLIC BLOOD PRESSURE: 90 MMHG | SYSTOLIC BLOOD PRESSURE: 154 MMHG | BODY MASS INDEX: 30.32 KG/M2 | TEMPERATURE: 97 F | HEART RATE: 93 BPM | HEIGHT: 65 IN | WEIGHT: 182 LBS

## 2023-01-09 DIAGNOSIS — L29.9 PRURITIC CONDITION: ICD-10-CM

## 2023-01-09 DIAGNOSIS — K76.6 PORTAL HYPERTENSION: ICD-10-CM

## 2023-01-09 DIAGNOSIS — R94.5 LIVER FUNCTION STUDY, ABNORMAL: ICD-10-CM

## 2023-01-09 DIAGNOSIS — K74.02 HEPATIC FIBROSIS, ADVANCED FIBROSIS: ICD-10-CM

## 2023-01-09 DIAGNOSIS — Z13.820 SCREENING FOR OSTEOPOROSIS: ICD-10-CM

## 2023-01-09 DIAGNOSIS — N28.1 RENAL CYST: ICD-10-CM

## 2023-01-09 DIAGNOSIS — N20.0 RENAL STONE: ICD-10-CM

## 2023-01-09 DIAGNOSIS — R74.8 ELEVATED ALKALINE PHOSPHATASE LEVEL: ICD-10-CM

## 2023-01-09 DIAGNOSIS — K76.0 FATTY LIVER: ICD-10-CM

## 2023-01-09 DIAGNOSIS — E11.9 DIABETES: ICD-10-CM

## 2023-01-09 DIAGNOSIS — K74.3 PBC (PRIMARY BILIARY CIRRHOSIS): ICD-10-CM

## 2023-01-09 PROBLEM — 197321007: Status: ACTIVE | Noted: 2020-08-08

## 2023-01-09 PROBLEM — 3723001 ARTHRITIS: Status: ACTIVE | Noted: 2022-09-28

## 2023-01-09 PROBLEM — 722223000: Status: ACTIVE | Noted: 2020-08-08

## 2023-01-09 PROBLEM — 279333002: Status: ACTIVE | Noted: 2020-08-08

## 2023-01-09 PROBLEM — 34742003: Status: ACTIVE | Noted: 2021-06-15

## 2023-01-09 PROBLEM — 95570007: Status: ACTIVE | Noted: 2020-08-08

## 2023-01-09 PROBLEM — 238131007: Status: ACTIVE | Noted: 2020-08-10

## 2023-01-09 PROCEDURE — 99214 OFFICE O/P EST MOD 30 MIN: CPT

## 2023-01-09 RX ORDER — GABAPENTIN 300 MG/1
1 CAPSULE CAPSULE ORAL TWICE A DAY
Status: ACTIVE | COMMUNITY

## 2023-01-09 RX ORDER — GLIPIZIDE 5 MG/1
0.5 TABLET 30 MINUTES BEFORE BREAKFAST TABLET ORAL ONCE A DAY
Status: ACTIVE | COMMUNITY

## 2023-01-09 RX ORDER — URSODIOL 500 MG/1
TAKE 1.5 TABLET IN AM AND 1 IN PM (TAKE WITH FOOD) TABLET ORAL TWICE A DAY
Qty: 225 | Refills: 1

## 2023-01-09 RX ORDER — MELOXICAM 15 MG/1
1 TABLET TABLET ORAL ONCE A DAY
Status: DISCONTINUED | COMMUNITY

## 2023-01-09 RX ORDER — URSODIOL 500 MG/1
TAKE 1.5 TABLET IN AM AND 1 IN PM (TAKE WITH FOOD) TABLET ORAL TWICE A DAY
Qty: 225 | Refills: 1 | Status: ACTIVE | COMMUNITY

## 2023-01-09 RX ORDER — FAMOTIDINE 40 MG/1
1 TABLET AT BEDTIME TABLET, FILM COATED ORAL ONCE A DAY
Status: ACTIVE | COMMUNITY

## 2023-01-09 RX ORDER — AMLODIPINE BESYLATE 5 MG/1
TAKE 1 TABLET (5 MG) BY ORAL ROUTE ONCE DAILY TABLET ORAL 1
Qty: 0 | Refills: 0 | Status: ACTIVE | COMMUNITY
Start: 1900-01-01

## 2023-01-09 NOTE — HPI-TODAY'S VISIT:
Khanh Sevilla Person, January 9 ultrasound shows liver to have an increased geographic echogenicity with macro and micro nodular contour and no definite focal lesions.  This would indicate chronic liver disease is present as expected. No dilated bile ducts were seen. Common bile duct normal at 3.4 mm. Gallbladder appeared normal with Wick sign negative. Pancreas appeared normal where seen. Right kidney was 9.5 cm with no hydronephrosis.  A simple right renal cyst was seen measuring 6.5 cm x 4 cm and previously 6.4 x 6 cm that was better seen on a recent MRI.  Nonshadowing echogenic foci seen in the mid right renal pole measuring 1.4 x 1.1 cm which they said could be corresponding to renal sinus fat.  Please share with primary provider/urologist to be aware of. Left kidney 11.4 cm with no hydronephrosis. Spleen normal at 12 cm. Liver vessels patent. Overall morphologic changes of chronic liver disease were seen with no suspicious lesions.  Patent vessels were seen.  Please share the right kidney findings with your primary urologist. Dr. Longoria

## 2023-01-09 NOTE — EXAM-PHYSICAL EXAM
General: pleasant, well developed, well nourished, no acute distress, non ill appearing Eyes- no scleral icterus HENT: normocephalic, atraumatic head, face mask covering mouth and nose Neck: supple, no JVD Chest: normal breath sounds, normal work of breathing Heart: regular rate and rhythm without murmur Abdomen: soft, non tender, non distended, bowel sounds present, no appreciable hepatomegaly or splenomegaly, no ascites Musculoskeletal: normal gait and station Extremities: no edema, no asterixis, some palmar erythema Skin: no rashes, no overt jaundice Neurologic: no focal deficits, alert and oriented x 3 Psychiatric: stable mood, appropriate affect

## 2023-01-09 NOTE — HPI-TODAY'S VISIT:
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  December 21 labs show INR normal at 1.0 which is good to see.  Is actually lower than back in September when it was 1.1. Sugar was elevated at 131 and as we mentioned before that may be related to you doing the labs not fasting.  Please share with primary providers. BUN of 17 creatinine 0.89 sodium 141 potassium 4.8 calcium 10.2 albumin 4.4 bilirubin 1.1 alkaline phosphatase went up a little to 423 from 323.  Any recent issues?  AST was 72 ALT of 70 up from 57 and 49. White blood cell count 4.1 hemoglobin 14.6 platelet count 151 MCV 98 neutrophils 2.2 lymphocytes 1.3. Called pt: took a medicine for his knees: meloxicam he took and has 8% risk to raise the labs. So that is what did that.  He will stop this.  Need to redo the labs to see how doing off the meloxicam  September 19 labs show white blood cell count 3.6 hemoglobin 14.9 platelet count 145 down from 164.  Normal is from 150 up to 450.  1 year ago and September 3, 2021 the platelet count was about the same at 144. Neutrophils 1.6 lymphocytes 1.4 both normal.  Sugar was slightly up at 148 from previous 137.  BUN of 12 Cr 0.68.  Sodium 143 potassium 4.2 albumin 4.2 bilirubin normal at 0.6 and down from 0.9. Alkaline phosphatase down from 404 to 323.  AST slightly lower at 57 from 58.  ALT down to 49 from 58.  So they are moving in the right direction. INR normal at 1.1. Meld 7 and meld na 7 so remains low.  June 20 labs showed sugar elevated at 138.  BUN of 14 creatinine 0.79 sodium 140 potassium 4.6 chloride 102 CO2 27. Albumin 4.0 bilirubin 0.8 down from 1.1.  Urine bilirubin 0.38 down from 0.43.  Alkaline phosphatase 319 from 298 so it went up slightly.  AST went down to 45 and ALT to 46 from previous AST 60 and ALT 60. Overall the liver labs and the AST and ALT are lower and the alk phos slightly higher.  Did the u,.s and pending.   July 9 MRI sent in to me. Lower thorax shows minimal bibasilar atelectasis. Liver showed no significant fat or iron but does have chronic liver disease changes seen including lobar redistribution and nodular contour.  There are some reticular enhancement changes that are compatible with fibrosis.  No suspicious liver lesions seen.   Liver vessels are patent as expected. Small varices seen near the stomach and spleen.  Important to stay on top of egd surveillance for these issues. Recannulized periumbilical vein noted which is another sign of portal hypertension Spleen slightly enlarged at 13 cm. Pancreas and adrenal glands normal. Kidneys show some renal cysts and no further description was given. They did see your appendix and it appeared normal to them. They also saw some mild atherosclerotic disease of the abdominal aorta but without aneurysm.  Please share with primary providers to be aware of same. Degenerative changes seen of your spine. We may be able to look towards doing an ultrasound alternating with an MRI and we will discuss this further at your next visit.   March 15 labs show glucose elevated at 137 previously 143 and please share with primary provider.  BUN of 14 creatinine 0.86 sodium 139 potassium 4.8 calcium 10.0 albumin 4.2 bilirubin 0.9.  These other labs are normal range. Alkaline phosphatase did go up to 404 from previous 394 and prior 327.  AST came down from 63 to 58.  ALT came down from 68 to 58. Hill cell count 4.1 hemoglobin 14.9 platelet count 164 normal.  MCV 89. Neutrophils 1.4 and lymphocytes 1.9.  Weight is stable for him.  Newer meds for opbc coming.   Ocaliva was on it had toxicity risk and not much lab benefit and being cirrhotic is higher risk and he is  getting older.  January 29 2022 MRI Lower thorax showed bibasilar atelectasis which means that the bases of the lungs were not fully expanded.  So metimes we can see that when you are in the MRI machine in that fixed position for a while.  Please share with primary provider. Liver showed no fat or iron but did have chronic liver disease changes including a nodular contour and lobar redistribution.  Some fibrosis changes seen.  No suspicious lesions.  Small left lobe hepatic cyst seen. Gallbladder normal with some unchanged mild intrahepatic bile duct dilation most pronounced in the periphery. Spleen was top normal in size at 13 cm. Pancreas, adrenal glands, and lymph nodes were normal. Stable renal cyst seen bilaterally. Mild atherosclerosis of the aorta seen without aneurysm.  Small varices near the esophagus and stomach so would need to stay on top of that EGD surveillance. Mild degenerative changes seen of the spine.  He did a scope with Dr Gabby Vann and take anticid. She did not mention any varices. Need that report.  He did the covid 19 series and March 5 2021. He did the covid booster x 2.  Meds off ocaliva for summer 2020.   November 30 labs show INR normal at 1.0 which is good to see.  Glucose currently 154 elevated and previously was 143 and prior to that 129.  All 3 were elevated.  We have discussed this previously.  Please share with primary providers per their review. BUN of 13 creatinine 0.84 sodium 142 potassium 4.8 chloride 102 CO2 of 27 calcium elevated at 10.3. Asked if he is on any calcium supplements?  he said not taking any that he knows. Previously was normal at 10.2 and prior 10.1.  Please share with primary provider also.  Albumin 4.5 bilirubin 1.0 which is normal.  Alkaline phosphatase elevated at 394 previously 327 and prior 309.  AST 63 and ALT 68 which appeared to be slightly higher from prior AST of 47 ALT 45. Asked if he had any new meds and he says he is a vit d supplement. White blood cell count 4 hemoglobin 14.8 platelet count slightly low at 146 but improved from last time at 144.  MCV normal at 90.  Neutrophils 1.5 and lymphocytes 1.7. Meld low at 6 and meld na 6.    September 3 labs show INR remains perfectly normal at 1.0 and was previously 1.1. Glucose still remains elevated at 143 previously 129 and share with primary provider.  BUN of 13 creatinine 0.84 sodium 140 potassium 4.5 calcium 10.2 albumin 4.1 bilirubin 1.0.  Previously bilirubin 0.7 but remains normal again at 1.0.  Alkaline phosphatase slightly up at 327 from 309 previously 355.  AST down to 47 from 55 from prior 63.  ALT 45 down from 51 and prior 59 so those continue to drop.  White blood cell count 4.3 hemoglobin 14.9 platelet count 144 which is slightly lower from 162.  MCV 90.  Neutrophils normal at 1.4. Meld remains low at 6 and meld na 6.  Last mri in July and due for another one in Jan 2022.   July 10 MRI shows the lower thorax to have bibasilar atelectasis. Liver shows morphologic changes of chronic liver disease with nodular contour and lobar redistribution.  They do see changes in the liver parenchyma that are suggestive of fibrosis.  More confluent fibrosis seen in the right lobe. No definite suspicious liver lesions seen.  You do have some perfusion anomalies which need to be rechecked in 6 months.  Scattered hepatic cysts are seen. Gallbladder was unremarkable and mild intrahepatic bile duct dilation was seen also more conspicuous in the right lobe versus the left.  This is felt to be compatible with your PBC diagnosis.  No extrahepatic bile duct dilation seen. Spleen top normal at 12.9 cm with small splenule in the left upper quadrant. Pancreas normal. Renal cysts were seen. Colon diverticulosis was noted as well. No inflammation however was seen in the colon. Mild paraesophageal perigastric and perisplenic varices were seen and moderate aortobiiliac atherosclerosis seen. Overall they felt you had signs of chronic liver disease but no evidence of any malignancy.   June 9 laboratories show white blood cell count 4.3 hemoglobin 13.8 platelet 162.  These are stable for you.  Platelet count is actually better than it was before at 151.  Neutrophils are stable at 1.6 and previously were 1.5.  Total bilirubin is down to 0.7 from 0.8.  Alkaline phosphatase is lower at 309 from 355.  AST down to 55 from 63 ALT 51 down from 59 and prior to that 68.  So the liver labs continue to be dropping.  Sodium 139 potassium 4.6 chloride 102 CO2 26 BUN 17 creatinine 0.93 glucose 129.  Sugar is actually lower than the previous time at 146. INR 1.1.   Meld score low at 7 and meld na 7.  April 27: alb 4.3 and tb 1.1 and db 0.43 and alk 298 and ast 60 and alt 60.  March 2: alk 355 and ast 63 and alt 59 so in fact alk phos lower now to 298 and ast and alt about the same.  8 weeks off labs March 2 and inr 1.0 and tb 0.8 and alk 355 and down from 376 and prior 392. ast 63 and alt 59 and down from 65 and 68 so little lower.  ca 10.0. na 140 and k 5.0 and glu 146 elevated. bun 18 and cr 0.9. wbc 3.9 and hg 14.9 and plat 151 and mcv 90.   4 weeks off labs: jan 25 2021 and inr normal 1.0 and so little lower now. tb 0.8 and lower from 0.9 and alk 376 from 392 so lower and ast 65 and alt 68 and prior ast 60 and alt 68 so not much of a change seen. na 143 and k 4.6 and cl 103 and co2 23 and cr 0.93. glu 128 elevated and mentioned to him. defer to local md re your sugars. cr 0.93. wbc 4.1 hg 14.5 and plat 138 (down from 165) and mcv 89.  Jan 23 mri: Morphologic changes of chronic liver disease without  hepatocellular carcinoma seen so that is good to note. Patent portal venous system with stigmata portal hypertension, including upper abdominal varices and splenomegaly noted so need to stay on top of egd surveillance. Right renal cyst with area of complexity on prior ultrasound demonstrates no septation, enhancement, or nodularity on this examination, compatible  with a simple cyst ( 4.7 x 6.2 x 7.8 cm.) This may have represented artifact on prior ultrasound. Continued attention on follow-up for liver disease was recommended. Liver showed no fat or iron. Scattered simple cysts in the left hepatic  lobe. Periesophageal, perigastric, perisplenic varices seen. Mild intrahepatic duct dilatation peripherally extending to the capsule, greater in the right hepatic lobe, compatible with primary biliary cirrhosis. No extrahepatic biliary ductal dilatation. Normal gallbladder. Spleen was enlarged measuring 13.1 cm in the craniocaudal dimension.Adjacent splenule. Pancreas normal. Mild atherosclerotic disease abdominal aorta without aneurysm. Mild degenerative changes of the spine.  Document Type: MRI Abdomen w/ + w/o Contrast Document Date: January 23, 2021 09:35 Document Status: Auth (Verified) Document Title: MRI Abdomen w/ + w/o Contrast Performed By: Jason Kapadia Verified By: Jason Kapadia on January 25, 2021 07:35 Encounter info: [id number], [facility], Single Visit OP, 1/23/2021 - 1/23/2021 * Final Report * Reason For Exam PBC//HEPATIC FIBROSIS//DIABETES//FATTY LIVER//RENAL CYST//ELEVATED ALKALINE PHOSPHATASE LEVEL REPORT EXAM: MRI Abdomen w/ + w/o Contrast CLINICAL INDICATION: PBC//HEPATIC FIBROSIS//DIABETES//FATTY LIVER//RENAL CYST//ELEVATED ALKALINE PHOSPHATASE LEVEL. TECHNIQUE: Multisequence, multiplanar MRI of the abdomen was performed without and with intravenous contrast. ESRC.2.7.3 CONTRAST: 16 cc of Prohance COMPARISON: Outside MRI dated 9/1/2020. Abdominal ultrasound dated 8/10/2020. FINDINGS: Lower Thorax: Normal. Liver: No fat or iron. Morphologic changes of chronic liver disease, including lobar redistribution and nodular contour. Delayed reticular enhancement of the hepatic parenchyma, compatible with fibrosis. More confluent fibrosis in the right hepatic lobe. Scattered simple cysts in the left hepatic lobe. No hepatocellular carcinoma. Hepatic vasculature is patent. Recannulized paraumbilical vein. Periesophageal, perigastric, perisplenic varices. Gallbladder/Biliary Tree: Mild intrahepatic duct dilatation peripherally extending to the capsule, greater in the right hepatic lobe, compatible primary biliary cirrhosis. No extrahepatic biliary ductal dilatation. Normal gallbladder. Spleen: Enlarged measuring 13.1 cm in the craniocaudal dimension. Adjacent splenule. Pancreas: Normal. Adrenal Glands: Normal. Kidneys/Ureters: Renal cysts. Right renal cyst with area of complexity on prior ultrasound demonstrates no septation, enhancement, or nodularity on this examination and measures 4.7 x 6.2 x 7.8 cm. Gastrointestinal: No bowel obstruction. Lymph Nodes: Normal. Vessels: Mild atherosclerotic disease abdominal aorta without aneurysm. Peritoneum/Retroperitoneum: Normal. Bones/Soft Tissues: Mild degenerative changes of the spine. IMPRESSION: 1. Morphologic changes of chronic liver disease without hepatocellular carcinoma. 2. Patent portal venous system with stigmata portal hypertension, including upper abdominal varices and splenomegaly. 3. Right renal cyst with area of complexity on prior ultrasound demonstrates no septation, enhancement, or nodularity on this examination, compatible with a simple cyst. This may have represented artifact on prior ultrasound. Continued attention on follow-up for liver disease. Signature Line *** Final ***  Electronically Signed By: Jason Kapadia on 01/25/2021 07:35 Dictated by: Jason Kapadia  2 weeks off ocaliva: Sanju 15: glu 117 elevated some but similar to nov 117 but down from 204 in dec 30. bun 16 and cr 0.91. na 141 and k 4.7 and cl 102 and ca 10.1 and alb 4.2 and tb 0.9 down from 1.1 in nov. ast 60 and alt 68 and so about the same as you can see vs other two labs and alk 392  about the same vs dec but little up from nov 354. wbc 4.1 hg 14.8 plat 165. inr 1.1 stable. So no dramatic changes seen yet that point.  Dec 30 2020 labs in middle: wbc 3.9 hg 15.1 plat 167. mcv 97. tb 0.9 down  from 1.1 and alk 399 slightly higher from 354. ast 64 and alt 66 and prior ast 63 and alt 60. na 139 and k 4.7 and cl 99 and co2 27. glu 204 elevated so that is newer issue as prior 117 and 134 so show to local md. bun 15 and cr 1.07 little up and prior 0.85 so little dry and could be linked to the sugars.  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  He is on urosodiol 500mg BID and had been on for years Ocaliva 10mg q other day due to pruritis and then off due to pruritis.  He weighs 183. Max dose 1100 to 1250 range 13-15mg/kg and we can ursodiol to 1.5 in the am and 1 in the evening. that may help and will avoid something more risky.  Itching much better on the gabapentin and off the ocaliva.  Nov 25 2020: wbc 4.2 hg 15 and plat 150 and mcv 90. Neutrophils 1.3 and prior 1.4 and lymphs 2.0 and prior 1.9. tb 1.1 and alk 354 and ast 63 and alt  60 and prior tb 0.9 and alk 343 and ast 55 and alt 61. So about the same. glu 117 and down from 134. bun 15 and cr 0.85 and na 139 and k 4.4 and cl 101 and co2 25. alb 4.2 normal.  Saw local mri: 9-1 20: nonenhancing renal cyst and no definite renal mass. Largest cyst right kidney 5.7cm. Not surprisingly they thought liver appeared to be cirrhotic. Also apparent nonenhancing cysts in the liver up to 10.6mm. Nonspecific biliary dilation in liver. Extrahepatic duct appears decompressed. Nonspecific splenomegaly seen and no significant varices seen. Left adrenal suspected adenoma. Appendix seemed somewhat prominent to then at 6.4mm but was probably similar to prior per them. No definite gallstones. Prior [facility] imaging liver coarsened. They recommended mri to better see possible complex renal cyst.  Saw urologist re the kidney issue was stable. They were to see him again in feb 2021 and had been changed.  The patient relates no significant family or personal history of liver disease. He states no history of new medications or alcohol use. The  patient reports a personal history of no other habits that could cause liver damage.  July 2020: tsh very low and show local md. T4 normal 8.5. inr 1.1 and tb 0.9 and alk 343 and ast 55 and alt 61 and tb 0.9 and alb 4.2 and ca 10.6 elevated and show local md also. na 144 and k 4.8. glu 134 elevated and maybe not fasting. cr 0.83. wbc 4.1 hg 15.3 plat 170.  Prior April ast 67 and alt 66 and alk 362 and tb 0.9 so liver labs little lower this last time despite the low dose and alk little lower also. calcium prior 10.1 and is not on any calcium supplements.  He says local doctor following thyroid and says he is doing better.  4- wbc 4.0 and hg 14.3 and plat 155 and neutrophils 1.2 little low. glu 130 and cr 0.78 and na 140 and k 4.4 and cl 101 and co2 22 and alb 4.3 and tb 0.9 and alk 362 and ast 67 and alt 66 and inr 1.1.  2-17-20 and wbc 4.1 hg 14.5 plat 153 and neutrophils 1.3 and glu 143 and cr 0.96 and na 140 and k 4.3 and cl 100 and co2 25 and alb 4.0 and tb 0.8 and alk 352 and ast 66 and alt 61 and inr 1.0.  12-16-19 and wbc 3.6 and hg 14.3 and plat 184 and neutrophils 1.3 and glu 124 and cr 0.86 and na 142 and k 5.0 and cl 102 and cl2 25 and alb 4.3 and tb 0.9 and alk 353 and ast 70 and alt 67 and inr 1.0.  Document Type: US Abdomen Doppler Complete Document Date: August 10, 2020 10:04 Document Status: Auth (Verified) Document Title: US Abdomen Doppler Complete Performed By: Jack Martin Verified By: Candelaria Rodriguez on August 10, 2020 11:41 Encounter info: [id number], [facility], Single Visit OP, 8/10/2020 - * Final Report * Reason For Exam primary billary cholangitis REPORT EXAM: US Abdomen Doppler Complete, US Abdomen Complete CLINICAL INDICATION: Primary billary cholangitis. TECHNIQUE: Grayscale, pulsed wave and color Doppler sonography of the upper abdomen were performed. ESRC.3.7.1 COMPARISON: None FINDINGS: Liver: Coarsened echotexture. No lesions. Bile Ducts: No dilated intrahepatic biliary radicles. Common duct measures 4 mm. Gallbladder: Normal. Wick's sign is negative. Pancreas: Partially obscured by overlying structures. Right Kidney: Measures 11.4 cm. Normal echogenicity. No hydronephrosis. 6.4 x 5.9 x 5.3 cm inferior pole cystic lesion with internal thickened septation versus areas of nodularity, indeterminate. Hyperechoic nonshadowing cortical focus measuring 0.5 x 0.4 x 0.2 cm.. Nonshadowing hyperechoic focus near the corticomedullary junction measuring 1.1 x 0.8 x 0.6 cm. Left Kidney: Measures 10.6 cm. Normal echogenicity. No hydronephrosis. Spleen: Measures 12.2 cm. Aorta: Normal caliber where imaged. IVC: Normal proximally, not imaged distally. Hepatic Veins: Normal. Portal Veins: Hepatopetal flow. Velocity of 27 cm/s in the main portal vein, 25 cm/s in the right portal vein, and 20 cm/s in the left portal vein. Hepatic Arteries: Peak systolic velocity 115 cm/s. Resistive index 0.77. Other: None. IMPRESSION: 1. Complex cystic lesion within the right kidney with thickened septation/areas of nodularity. Recommend MRI for further evaluation to exclude enhancing/solid components. Nonshadowing echogenic foci in the right kidney may represent nonobstructing stones although underlying lesions cannot be excluded. This can be evaluated at the time of MRI. 2. Coarsened hepatic echotexture can be seen with hepatic steatosis or chronic liver disease. No intra or extrahepatic biliary ductal dilation. Patent hepatic vasculature. The images were reviewed and interpreted by Candelaria Rodriguez MD. Signature Line *** Final *** Electronically Signed By: Candelaria Rodriguez on 08/10/2020 11:41 Dictated by: Jack Martin  12- u/s coarse hepatic ehcotexure and consistent with cirrhosis and 1.2cm hepatic cyst. Patent vessels and right renal cysts up to 5.7cm and some nonobstructing right renal calculi. Spleen 12.3cm.  Oct 14 2019 wbc 3.8 and hg 14.4 plat 177 and neutrophils 1.3 little low and glu 123 and cr 0.74 and na 141 and k 4.5 and cl 100 and co2 25 and alb 4.3 and tb 0.9 and alk 336 ast 67 and alt 61. hep b immune 40.5  Sept 18 2019 na 140 and k 4.7 and cl 103 and glu 121 and bun 12 and cr 0.79 alb 3,8 and tb 0,9 and ca 9.6 and ast 49 and alt 48 and alk 334 and a1c 5.7 and chol 228 and trg 52 and hdl 86 and ldl 132 nd psa 0.01 and wbc 4.2 and hg 144 and plat 183.  June 19 2019 and liver midly coarse and 1cm hepatic cyst and stable and gb not distended and no stones and bile ducts not dilated and spleen stable 12.3cm abd right kdiney 11cm and several right kidney cysts up to 6.2cm. Saw stone sin the right kidneuy. No hydronephrosios. Vessels patent. No change vs 2018.  Dec 2018 u.s with liver appearing fatty and patent vessels. Right kidney cyst 6.1x4.7x5.3cm stable abd simple. Liver cyst 1.1x0.8x1.1.cm. Similar to prior scan.  April 2019 labs wbc 4.3 hg 14.7 plat 183 and glu 134 and cr 0.85 and na 141 k 5.1 and tb 1.0 and alk 446 and ast 85 and alt 91.  Feb 2019 alk 418 and ast 79 and alt 81.  Dec 2018 alk 440 and tb 0.9 and db 0.48 and ast 76 and alt 92. Liver 76% and bone 20% and intestine 4%.  11/26/2018: wbc 4.6, hgb 14.7, plts 175,000, gluc 130, vet 0.89, na 142, k 4.0, alb 4.4, frances 2.9, t.bili 1.0, , AST 75, ASLT 80,INR 1.0, TSH 2.26  9/04/2018: wbc 4.6, hgb 14.4 plts 166,000, gluc 130, creat 0.95, na 143, k 4.5, alb 4.3, frances 3.1, t.bili 0.8, , AST56, ALT 62, INR 1.0, TSH 2.17,  6/05/2018: HBsAb 4.5, HBsAg neg, HBcAb neg, HAV pos  6/20/2018: liver echogenic suggesting mild fatty infiltration, simple cyst 1.1 x 0.7 x 1.3,, gallbladder unremarkable, mpv patent, cbd 4mm, right kidney simple cyst 5.6 x 4.8 x 5.2 cm, echoegenic focus 1.3cm consider kidney stone.  His cholesterol is checked by Dr. Sachin Dimas. He says cholesterol has been ok.   Reminded pt re bone density and needs to be following locally and he has not done this and reminded to get with local provider.  11-13-19: spine normal and t score 0.1 and z score 0.1/ femur neck -0.9 and -0.4 and femur total -1.0 and -0.9. He shared with primary md. he will check with primary md to redo as been 2 yrs.   Plan: 1. Stay  ursodol to 500mg 1.5am and 1 in the evening with food. 2. Need to see how labs are off the meloxicam. 3. Plan to redo the labs in  . 4. Telemed in  and plan mri vs u.s next time.  Stressed to pt the need for social distancing and strict handwashing and wearing a mask and to follow any other new or added CDC recommendations as this is an evolving target.  Duration of the visit was 30 min with 10 min of prep and 20 min with greater than 50% of the time spent on coordination of care and in reviewing chart with the pt.

## 2023-01-10 ENCOUNTER — TELEPHONE ENCOUNTER (OUTPATIENT)
Dept: URBAN - METROPOLITAN AREA CLINIC 92 | Facility: CLINIC | Age: 83
End: 2023-01-10

## 2023-01-10 ENCOUNTER — OFFICE VISIT (OUTPATIENT)
Dept: URBAN - METROPOLITAN AREA CLINIC 86 | Facility: CLINIC | Age: 83
End: 2023-01-10

## 2023-01-10 LAB
ALBUMIN/GLOBULIN RATIO: 1.2
ALBUMIN: 4.3
ALKALINE PHOSPHATASE: 400
ALT (SGPT): 69
AST (SGOT): 63
BILIRUBIN, DIRECT: 0.5
BILIRUBIN, INDIRECT: 0.8
BILIRUBIN, TOTAL: 1.3
GLOBULIN: 3.5
PROTEIN, TOTAL: 7.8

## 2023-01-10 RX ORDER — URSODIOL 500 MG/1
TAKE 1.5 TABLET IN AM AND 1 IN PM (TAKE WITH FOOD) TABLET ORAL TWICE A DAY
Qty: 225 | Refills: 1

## 2023-01-10 NOTE — HPI-TODAY'S VISIT:
Dear Roel Trejo, The redo the January 9 labs show albumin 4.3 bilirubin elevated at 1.3, direct 0.5, alk phos 400 and AST 63 and ALT 69. These are slightly lower than your December 21 labs that showed an alkaline phosphatase of 423 AST of 72 and ALT of 70.   I would redo the labs again in 2 to 3 weeks and see if they drop more off that meloxicam.

## 2023-01-10 NOTE — HPI-TODAY'S VISIT:
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  Dear Roel Trejo, December 21 labs show INR normal at 1.0 which is good to see.  Is actually lower than back in September when it was 1.1. Sugar was elevated at 131 and as we mentioned before that may be related to you doing the labs not fasting.  Please share with primary providers. BUN of 17 creatinine 0.89 sodium 141 potassium 4.8 calcium 10.2 albumin 4.4 bilirubin 1.1 alkaline phosphatase went up a little to 423 from 323.  Any recent issues?  AST was 72 ALT of 70 up from 57 and 49. White blood cell count 4.1 hemoglobin 14.6 platelet count 151 MCV 98 neutrophils 2.2 lymphocytes 1.3. Called pt: took a medicine for his knees: meloxicam he took and has 8% risk to raise the labs. So that is what did that.  He will stop this. Dr Longoria  September 19 labs show white blood cell count 3.6 hemoglobin 14.9 platelet count 145 down from 164.  Normal is from 150 up to 450.  1 year ago and September 3, 2021 the platelet count was about the same at 144. Neutrophils 1.6 lymphocytes 1.4 both normal.  Sugar was slightly up at 148 from previous 137.  BUN of 12 Cr 0.68.  Sodium 143 potassium 4.2 albumin 4.2 bilirubin normal at 0.6 and down from 0.9. Alkaline phosphatase down from 404 to 323.  AST slightly lower at 57 from 58.  ALT down to 49 from 58.  So they are moving in the right direction. INR normal at 1.1. Meld 7 and meld na 7 so remains low.  June 20 labs showed sugar elevated at 138.  BUN of 14 creatinine 0.79 sodium 140 potassium 4.6 chloride 102 CO2 27. Albumin 4.0 bilirubin 0.8 down from 1.1.  Urine bilirubin 0.38 down from 0.43.  Alkaline phosphatase 319 from 298 so it went up slightly.  AST went down to 45 and ALT to 46 from previous AST 60 and ALT 60. Overall the liver labs and the AST and ALT are lower and the alk phos slightly higher. Dr Longoria   July 9 MRI sent in to me. Lower thorax shows minimal bibasilar atelectasis. Liver showed no significant fat or iron but does have chronic liver disease changes seen including lobar redistribution and nodular contour.  There are some reticular enhancement changes that are compatible with fibrosis.  No suspicious liver lesions seen.   Liver vessels are patent as expected. Small varices seen near the stomach and spleen.  Important to stay on top of egd surveillance for these issues. Recannulized periumbilical vein noted which is another sign of portal hypertension Spleen slightly enlarged at 13 cm. Pancreas and adrenal glands normal. Kidneys show some renal cysts and no further description was given. They did see your appendix and it appeared normal to them. They also saw some mild atherosclerotic disease of the abdominal aorta but without aneurysm.  Please share with primary providers to be aware of same. Degenerative changes seen of your spine. We may be able to look towards doing an ultrasound alternating with an MRI and we will discuss this further at your next visit.   March 15 labs show glucose elevated at 137 previously 143 and please share with primary provider.  BUN of 14 creatinine 0.86 sodium 139 potassium 4.8 calcium 10.0 albumin 4.2 bilirubin 0.9.  These other labs are normal range. Alkaline phosphatase did go up to 404 from previous 394 and prior 327.  AST came down from 63 to 58.  ALT came down from 68 to 58. Hill cell count 4.1 hemoglobin 14.9 platelet count 164 normal.  MCV 89. Neutrophils 1.4 and lymphocytes 1.9.  Weight is stable.  New medicines may be coming next year coming.  Ocaliva was on it had toxicity risk and not much lab benefit and being cirrhotic is higher risk and he is  getting older.  January 29 2022 MRI Lower thorax showed bibasilar atelectasis which means that the bases of the lungs were not fully expanded.  So metimes we can see that when you are in the MRI machine in that fixed position for a while.  Please share with primary provider. Liver showed no fat or iron but did have chronic liver disease changes including a nodular contour and lobar redistribution.  Some fibrosis changes seen.  No suspicious lesions.  Small left lobe hepatic cyst seen. Gallbladder normal with some unchanged mild intrahepatic bile duct dilation most pronounced in the periphery. Spleen was top normal in size at 13 cm. Pancreas, adrenal glands, and lymph nodes were normal. Stable renal cyst seen bilaterally. Mild atherosclerosis of the aorta seen without aneurysm.  Small varices near the esophagus and stomach so would need to stay on top of that EGD surveillance. Mild degenerative changes seen of the spine.  He did a scope with Dr Gabby Vann and take anticid. She did not mention any varices. Need that report.  He did the covid 19 series and March 5 2021. He did the covid booster.  Meds off ocaliva for summer 2020.   November 30 labs show INR normal at 1.0 which is good to see.  Glucose currently 154 elevated and previously was 143 and prior to that 129.  All 3 were elevated.  We have discussed this previously.  Please share with primary providers per their review. BUN of 13 creatinine 0.84 sodium 142 potassium 4.8 chloride 102 CO2 of 27 calcium elevated at 10.3. Asked if he is on any calcium supplements?  he said not taking any that he knows. Previously was normal at 10.2 and prior 10.1.  Please share with primary provider also.  Albumin 4.5 bilirubin 1.0 which is normal.  Alkaline phosphatase elevated at 394 previously 327 and prior 309.  AST 63 and ALT 68 which appeared to be slightly higher from prior AST of 47 ALT 45. Asked if he had any new meds and he says he is a vit d supplement. White blood cell count 4 hemoglobin 14.8 platelet count slightly low at 146 but improved from last time at 144.  MCV normal at 90.  Neutrophils 1.5 and lymphocytes 1.7. Meld low at 6 and meld na 6.    September 3 labs show INR remains perfectly normal at 1.0 and was previously 1.1. Glucose still remains elevated at 143 previously 129 and share with primary provider.  BUN of 13 creatinine 0.84 sodium 140 potassium 4.5 calcium 10.2 albumin 4.1 bilirubin 1.0.  Previously bilirubin 0.7 but remains normal again at 1.0.  Alkaline phosphatase slightly up at 327 from 309 previously 355.  AST down to 47 from 55 from prior 63.  ALT 45 down from 51 and prior 59 so those continue to drop.  White blood cell count 4.3 hemoglobin 14.9 platelet count 144 which is slightly lower from 162.  MCV 90.  Neutrophils normal at 1.4. Meld remains low at 6 and meld na 6.  Last mri in July and due for another one in Jan 2022.   July 10 MRI shows the lower thorax to have bibasilar atelectasis. Liver shows morphologic changes of chronic liver disease with nodular contour and lobar redistribution.  They do see changes in the liver parenchyma that are suggestive of fibrosis.  More confluent fibrosis seen in the right lobe. No definite suspicious liver lesions seen.  You do have some perfusion anomalies which need to be rechecked in 6 months.  Scattered hepatic cysts are seen. Gallbladder was unremarkable and mild intrahepatic bile duct dilation was seen also more conspicuous in the right lobe versus the left.  This is felt to be compatible with your PBC diagnosis.  No extrahepatic bile duct dilation seen. Spleen top normal at 12.9 cm with small splenule in the left upper quadrant. Pancreas normal. Renal cysts were seen. Colon diverticulosis was noted as well. No inflammation however was seen in the colon. Mild paraesophageal perigastric and perisplenic varices were seen and moderate aortobiiliac atherosclerosis seen. Overall they felt you had signs of chronic liver disease but no evidence of any malignancy.   June 9 laboratories show white blood cell count 4.3 hemoglobin 13.8 platelet 162.  These are stable for you.  Platelet count is actually better than it was before at 151.  Neutrophils are stable at 1.6 and previously were 1.5.  Total bilirubin is down to 0.7 from 0.8.  Alkaline phosphatase is lower at 309 from 355.  AST down to 55 from 63 ALT 51 down from 59 and prior to that 68.  So the liver labs continue to be dropping.  Sodium 139 potassium 4.6 chloride 102 CO2 26 BUN 17 creatinine 0.93 glucose 129.  Sugar is actually lower than the previous time at 146. INR 1.1.   Meld score low at 7 and meld na 7.  April 27: alb 4.3 and tb 1.1 and db 0.43 and alk 298 and ast 60 and alt 60.  March 2: alk 355 and ast 63 and alt 59 so in fact alk phos lower now to 298 and ast and alt about the same.  8 weeks off labs March 2 and inr 1.0 and tb 0.8 and alk 355 and down from 376 and prior 392. ast 63 and alt 59 and down from 65 and 68 so little lower.  ca 10.0. na 140 and k 5.0 and glu 146 elevated. bun 18 and cr 0.9. wbc 3.9 and hg 14.9 and plat 151 and mcv 90.   4 weeks off labs: jan 25 2021 and inr normal 1.0 and so little lower now. tb 0.8 and lower from 0.9 and alk 376 from 392 so lower and ast 65 and alt 68 and prior ast 60 and alt 68 so not much of a change seen. na 143 and k 4.6 and cl 103 and co2 23 and cr 0.93. glu 128 elevated and mentioned to him. defer to local md re your sugars. cr 0.93. wbc 4.1 hg 14.5 and plat 138 (down from 165) and mcv 89.  Jan 23 mri: Morphologic changes of chronic liver disease without  hepatocellular carcinoma seen so that is good to note. Patent portal venous system with stigmata portal hypertension, including upper abdominal varices and splenomegaly noted so need to stay on top of egd surveillance. Right renal cyst with area of complexity on prior ultrasound demonstrates no septation, enhancement, or nodularity on this examination, compatible  with a simple cyst ( 4.7 x 6.2 x 7.8 cm.) This may have represented artifact on prior ultrasound. Continued attention on follow-up for liver disease was recommended. Liver showed no fat or iron. Scattered simple cysts in the left hepatic  lobe. Periesophageal, perigastric, perisplenic varices seen. Mild intrahepatic duct dilatation peripherally extending to the capsule, greater in the right hepatic lobe, compatible with primary biliary cirrhosis. No extrahepatic biliary ductal dilatation. Normal gallbladder. Spleen was enlarged measuring 13.1 cm in the craniocaudal dimension.Adjacent splenule. Pancreas normal. Mild atherosclerotic disease abdominal aorta without aneurysm. Mild degenerative changes of the spine.  Document Type: MRI Abdomen w/ + w/o Contrast Document Date: January 23, 2021 09:35 Document Status: Auth (Verified) Document Title: MRI Abdomen w/ + w/o Contrast Performed By: Jason Kapadia Verified By: Jason Kapadia on January 25, 2021 07:35 Encounter info: [id number], On license of [facility], Single Visit OP, 1/23/2021 - 1/23/2021 * Final Report * Reason For Exam PBC//HEPATIC FIBROSIS//DIABETES//FATTY LIVER//RENAL CYST//ELEVATED ALKALINE PHOSPHATASE LEVEL REPORT EXAM: MRI Abdomen w/ + w/o Contrast CLINICAL INDICATION: PBC//HEPATIC FIBROSIS//DIABETES//FATTY LIVER//RENAL CYST//ELEVATED ALKALINE PHOSPHATASE LEVEL. TECHNIQUE: Multisequence, multiplanar MRI of the abdomen was performed without and with intravenous contrast. ESRC.2.7.3 CONTRAST: 16 cc of Prohance COMPARISON: Outside MRI dated 9/1/2020. Abdominal ultrasound dated 8/10/2020. FINDINGS: Lower Thorax: Normal. Liver: No fat or iron. Morphologic changes of chronic liver disease, including lobar redistribution and nodular contour. Delayed reticular enhancement of the hepatic parenchyma, compatible with fibrosis. More confluent fibrosis in the right hepatic lobe. Scattered simple cysts in the left hepatic lobe. No hepatocellular carcinoma. Hepatic vasculature is patent. Recannulized paraumbilical vein. Periesophageal, perigastric, perisplenic varices. Gallbladder/Biliary Tree: Mild intrahepatic duct dilatation peripherally extending to the capsule, greater in the right hepatic lobe, compatible primary biliary cirrhosis. No extrahepatic biliary ductal dilatation. Normal gallbladder. Spleen: Enlarged measuring 13.1 cm in the craniocaudal dimension. Adjacent splenule. Pancreas: Normal. Adrenal Glands: Normal. Kidneys/Ureters: Renal cysts. Right renal cyst with area of complexity on prior ultrasound demonstrates no septation, enhancement, or nodularity on this examination and measures 4.7 x 6.2 x 7.8 cm. Gastrointestinal: No bowel obstruction. Lymph Nodes: Normal. Vessels: Mild atherosclerotic disease abdominal aorta without aneurysm. Peritoneum/Retroperitoneum: Normal. Bones/Soft Tissues: Mild degenerative changes of the spine. IMPRESSION: 1. Morphologic changes of chronic liver disease without hepatocellular carcinoma. 2. Patent portal venous system with stigmata portal hypertension, including upper abdominal varices and splenomegaly. 3. Right renal cyst with area of complexity on prior ultrasound demonstrates no septation, enhancement, or nodularity on this examination, compatible with a simple cyst. This may have represented artifact on prior ultrasound. Continued attention on follow-up for liver disease. Signature Line *** Final ***  Electronically Signed By: Jason Kapadia on 01/25/2021 07:35 Dictated by: Jason Kapadia  2 weeks off ocaliva: Sanju 15: glu 117 elevated some but similar to nov 117 but down from 204 in dec 30. bun 16 and cr 0.91. na 141 and k 4.7 and cl 102 and ca 10.1 and alb 4.2 and tb 0.9 down from 1.1 in nov. ast 60 and alt 68 and so about the same as you can see vs other two labs and alk 392  about the same vs dec but little up from nov 354. wbc 4.1 hg 14.8 plat 165. inr 1.1 stable. So no dramatic changes seen yet that point.  Dec 30 2020 labs in middle: wbc 3.9 hg 15.1 plat 167. mcv 97. tb 0.9 down  from 1.1 and alk 399 slightly higher from 354. ast 64 and alt 66 and prior ast 63 and alt 60. na 139 and k 4.7 and cl 99 and co2 27. glu 204 elevated so that is newer issue as prior 117 and 134 so show to local md. bun 15 and cr 1.07 little up and prior 0.85 so little dry and could be linked to the sugars.  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  He is on urosodiol 500mg BID and had been on for years Ocaliva 10mg q other day due to pruritis and then off due to pruritis.  He weighs 183. Max dose 1100 to 1250 range 13-15mg/kg and we can ursodiol to 1.5 in the am and 1 in the evening. that may help and will avoid something more risky.  Itching much better on the gabapentin and off the ocaliva.  Nov 25 2020: wbc 4.2 hg 15 and plat 150 and mcv 90. Neutrophils 1.3 and prior 1.4 and lymphs 2.0 and prior 1.9. tb 1.1 and alk 354 and ast 63 and alt  60 and prior tb 0.9 and alk 343 and ast 55 and alt 61. So about the same. glu 117 and down from 134. bun 15 and cr 0.85 and na 139 and k 4.4 and cl 101 and co2 25. alb 4.2 normal.  Saw local mri: 9-1 20: nonenhancing renal cyst and no definite renal mass. Largest cyst right kidney 5.7cm. Not surprisingly they thought liver appeared to be cirrhotic. Also apparent nonenhancing cysts in the liver up to 10.6mm. Nonspecific biliary dilation in liver. Extrahepatic duct appears decompressed. Nonspecific splenomegaly seen and no significant varices seen. Left adrenal suspected adenoma. Appendix seemed somewhat prominent to then at 6.4mm but was probably similar to prior per them. No definite gallstones. Prior [place] imaging liver coarsened. They recommended mri to better see possible complex renal cyst.  Saw urologist re the kidney issue was stable. They were to see him again in feb 2021 and had been changed.  The patient relates no significant family or personal history of liver disease. He states no history of new medications or alcohol use. The  patient reports a personal history of no other habits that could cause liver damage.  July 2020: tsh very low and show local md. T4 normal 8.5. inr 1.1 and tb 0.9 and alk 343 and ast 55 and alt 61 and tb 0.9 and alb 4.2 and ca 10.6 elevated and show local md also. na 144 and k 4.8. glu 134 elevated and maybe not fasting. cr 0.83. wbc 4.1 hg 15.3 plat 170.  Prior April ast 67 and alt 66 and alk 362 and tb 0.9 so liver labs little lower this last time despite the low dose and alk little lower also. calcium prior 10.1 and is not on any calcium supplements.  He says local doctor following thyroid and says he is doing better.  4- wbc 4.0 and hg 14.3 and plat 155 and neutrophils 1.2 little low. glu 130 and cr 0.78 and na 140 and k 4.4 and cl 101 and co2 22 and alb 4.3 and tb 0.9 and alk 362 and ast 67 and alt 66 and inr 1.1.  2-17-20 and wbc 4.1 hg 14.5 plat 153 and neutrophils 1.3 and glu 143 and cr 0.96 and na 140 and k 4.3 and cl 100 and co2 25 and alb 4.0 and tb 0.8 and alk 352 and ast 66 and alt 61 and inr 1.0.  12-16-19 and wbc 3.6 and hg 14.3 and plat 184 and neutrophils 1.3 and glu 124 and cr 0.86 and na 142 and k 5.0 and cl 102 and cl2 25 and alb 4.3 and tb 0.9 and alk 353 and ast 70 and alt 67 and inr 1.0.  Document Type: US Abdomen Doppler Complete Document Date: August 10, 2020 10:04 Document Status: Auth (Verified) Document Title: US Abdomen Doppler Complete Performed By: Jack Martin Verified By: Candelaria Rodriguez on August 10, 2020 11:41 Encounter info: [id number], On license of [facility], Single Visit OP, 8/10/2020 - * Final Report * Reason For Exam primary billary cholangitis REPORT EXAM: US Abdomen Doppler Complete, US Abdomen Complete CLINICAL INDICATION: Primary billary cholangitis. TECHNIQUE: Grayscale, pulsed wave and color Doppler sonography of the upper abdomen were performed. ESRC.3.7.1 COMPARISON: None FINDINGS: Liver: Coarsened echotexture. No lesions. Bile Ducts: No dilated intrahepatic biliary radicles. Common duct measures 4 mm. Gallbladder: Normal. Wick's sign is negative. Pancreas: Partially obscured by overlying structures. Right Kidney: Measures 11.4 cm. Normal echogenicity. No hydronephrosis. 6.4 x 5.9 x 5.3 cm inferior pole cystic lesion with internal thickened septation versus areas of nodularity, indeterminate. Hyperechoic nonshadowing cortical focus measuring 0.5 x 0.4 x 0.2 cm.. Nonshadowing hyperechoic focus near the corticomedullary junction measuring 1.1 x 0.8 x 0.6 cm. Left Kidney: Measures 10.6 cm. Normal echogenicity. No hydronephrosis. Spleen: Measures 12.2 cm. Aorta: Normal caliber where imaged. IVC: Normal proximally, not imaged distally. Hepatic Veins: Normal. Portal Veins: Hepatopetal flow. Velocity of 27 cm/s in the main portal vein, 25 cm/s in the right portal vein, and 20 cm/s in the left portal vein. Hepatic Arteries: Peak systolic velocity 115 cm/s. Resistive index 0.77. Other: None. IMPRESSION: 1. Complex cystic lesion within the right kidney with thickened septation/areas of nodularity. Recommend MRI for further evaluation to exclude enhancing/solid components. Nonshadowing echogenic foci in the right kidney may represent nonobstructing stones although underlying lesions cannot be excluded. This can be evaluated at the time of MRI. 2. Coarsened hepatic echotexture can be seen with hepatic steatosis or chronic liver disease. No intra or extrahepatic biliary ductal dilation. Patent hepatic vasculature. The images were reviewed and interpreted by Candelaria Rodriguez MD. Signature Line *** Final *** Electronically Signed By: Candelaria Rodriguez on 08/10/2020 11:41 Dictated by: Jack Martin  12- u/s coarse hepatic ehcotexure and consistent with cirrhosis and 1.2cm hepatic cyst. Patent vessels and right renal cysts up to 5.7cm and some nonobstructing right renal calculi. Spleen 12.3cm.  Oct 14 2019 wbc 3.8 and hg 14.4 plat 177 and neutrophils 1.3 little low and glu 123 and cr 0.74 and na 141 and k 4.5 and cl 100 and co2 25 and alb 4.3 and tb 0.9 and alk 336 ast 67 and alt 61. hep b immune 40.5  Sept 18 2019 na 140 and k 4.7 and cl 103 and glu 121 and bun 12 and cr 0.79 alb 3,8 and tb 0,9 and ca 9.6 and ast 49 and alt 48 and alk 334 and a1c 5.7 and chol 228 and trg 52 and hdl 86 and ldl 132 nd psa 0.01 and wbc 4.2 and hg 144 and plat 183.  June 19 2019 and liver midly coarse and 1cm hepatic cyst and stable and gb not distended and no stones and bile ducts not dilated and spleen stable 12.3cm abd right kdiney 11cm and several right kidney cysts up to 6.2cm. Saw stone sin the right kidneuy. No hydronephrosios. Vessels patent. No change vs 2018.  Dec 2018 u.s with liver appearing fatty and patent vessels. Right kidney cyst 6.1x4.7x5.3cm stable abd simple. Liver cyst 1.1x0.8x1.1.cm. Similar to prior scan.  April 2019 labs wbc 4.3 hg 14.7 plat 183 and glu 134 and cr 0.85 and na 141 k 5.1 and tb 1.0 and alk 446 and ast 85 and alt 91.  Feb 2019 alk 418 and ast 79 and alt 81.  Dec 2018 alk 440 and tb 0.9 and db 0.48 and ast 76 and alt 92. Liver 76% and bone 20% and intestine 4%.  11/26/2018: wbc 4.6, hgb 14.7, plts 175,000, gluc 130, vet 0.89, na 142, k 4.0, alb 4.4, frances 2.9, t.bili 1.0, , AST 75, ASLT 80,INR 1.0, TSH 2.26  9/04/2018: wbc 4.6, hgb 14.4 plts 166,000, gluc 130, creat 0.95, na 143, k 4.5, alb 4.3, frances 3.1, t.bili 0.8, , AST56, ALT 62, INR 1.0, TSH 2.17,  6/05/2018: HBsAb 4.5, HBsAg neg, HBcAb neg, HAV pos  6/20/2018: liver echogenic suggesting mild fatty infiltration, simple cyst 1.1 x 0.7 x 1.3,, gallbladder unremarkable, mpv patent, cbd 4mm, right kidney simple cyst 5.6 x 4.8 x 5.2 cm, echoegenic focus 1.3cm consider kidney stone.  His cholesterol is checked by Dr. Sachin Dimas. He says cholesterol has been ok.   Reminded pt re bone density and needs to be following locally and he has not done this and reminded to get with local provider.  11-13-19: spine normal and t score 0.1 and z score 0.1/ femur neck -0.9 and -0.4 and femur total -1.0 and -0.9. He shared with primary md. he will check with primary md to redo as been 2 yrs.   Plan: 1. Stay  ursodol to 500mg 1.5am and 1 in the evening with food. 2. Redo the labs in  . 3, U/s Sanju here same day of the visit. 4. See in person. 5. He will send the egd info.   Stressed to pt the need for social distancing and strict handwashing and wearing a mask and to follow any other new or added CDC recommendations as this is an evolving target.  Duration of the visit was 0 min with 10 min of prep and 20 min from 107 to 127 pm by clock as healow video fluxed during visit with greater than 50% of the time spent on coordination of care and in reviewing chart with the pt.

## 2023-01-22 ENCOUNTER — TELEPHONE ENCOUNTER (OUTPATIENT)
Dept: URBAN - METROPOLITAN AREA CLINIC 92 | Facility: CLINIC | Age: 83
End: 2023-01-22

## 2023-01-22 NOTE — HPI-TODAY'S VISIT:
Dear Roel Trejo, Thank you for sending the Egd from June 9 from Dr Vann. Gastric body and gastric antrum with congestion, erosions, and erythema. Duodenum was normal. Small hiatal hernia present. LA grade B esophagitis seen in esophagus. 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 They mentioned to redo in 3-5 yrs. They ordered pantoprazole 40mg po qd. Dr Longoria

## 2023-01-31 ENCOUNTER — LAB OUTSIDE AN ENCOUNTER (OUTPATIENT)
Dept: URBAN - METROPOLITAN AREA CLINIC 92 | Facility: CLINIC | Age: 83
End: 2023-01-31

## 2023-02-07 ENCOUNTER — TELEPHONE ENCOUNTER (OUTPATIENT)
Dept: URBAN - METROPOLITAN AREA CLINIC 92 | Facility: CLINIC | Age: 83
End: 2023-02-07

## 2023-02-07 LAB
ALBUMIN: 4.4
ALKALINE PHOSPHATASE: 427
ALT (SGPT): 82
AST (SGOT): 84
BILIRUBIN, DIRECT: 0.68
BILIRUBIN, TOTAL: 1.3
PROTEIN, TOTAL: 7.2

## 2023-02-07 NOTE — HPI-TODAY'S VISIT:
Dear Roel Trejo, February 6 labs show albumin 4.4 which is normal and in fact a little higher for you.  Total bilirubin still 1.3 as before on January 9.  Direct bilirubin 0.68.  Alk phos went up a little to 427 from 400 and AST 84 and ALT 82 with previous AST 63 and ALT 69. Any new changes to explain this?  I would repeat these labs again in a month to see how they are doing. Dr. Longoria

## 2023-03-07 ENCOUNTER — LAB OUTSIDE AN ENCOUNTER (OUTPATIENT)
Dept: URBAN - METROPOLITAN AREA CLINIC 92 | Facility: CLINIC | Age: 83
End: 2023-03-07

## 2023-03-08 ENCOUNTER — TELEPHONE ENCOUNTER (OUTPATIENT)
Dept: URBAN - METROPOLITAN AREA CLINIC 92 | Facility: CLINIC | Age: 83
End: 2023-03-08

## 2023-03-08 LAB
ALBUMIN: 4.5
ALKALINE PHOSPHATASE: 470
ALT (SGPT): 98
AST (SGOT): 93
BILIRUBIN, DIRECT: 0.68
BILIRUBIN, TOTAL: 1.1
PROTEIN, TOTAL: 7.3

## 2023-03-08 NOTE — HPI-TODAY'S VISIT:
Dear Roel Trejo, March 7 labs show albumin stable at 4.5 and actually rising which is good to see.  Bilirubin down to 1.1 from 1.3 with a direct 0.68.  Alk phos slightly higher at 470 from 427 AST and ALT slightly higher at 93 and 98 from previous AST of 84 and ALT of 82. Not really see in the labs drop yet and still slightly going up.  Again just confirming no new meds or herbal remedies?  Please let us know. Please review the labs for us before the next visit. Dr. Longoria

## 2023-04-08 ENCOUNTER — TELEPHONE ENCOUNTER (OUTPATIENT)
Dept: URBAN - METROPOLITAN AREA CLINIC 86 | Facility: CLINIC | Age: 83
End: 2023-04-08

## 2023-04-08 LAB
A/G RATIO: 1.2
ALBUMIN: 4.1
ALKALINE PHOSPHATASE: 452
ALT (SGPT): 88
AST (SGOT): 87
BILIRUBIN, TOTAL: 1.6
BUN/CREATININE RATIO: 22
BUN: 17
CALCIUM: 10.5
CARBON DIOXIDE, TOTAL: 26
CHLORIDE: 101
CREATININE: 0.77
EGFR: 90
GLOBULIN, TOTAL: 3.3
GLUCOSE: 119
POTASSIUM: 4.8
PROTEIN, TOTAL: 7.4
SODIUM: 141

## 2023-04-08 NOTE — HPI-TODAY'S VISIT:
Dear Roel Trejo, April 7 labs show sugar still elevated at 119 but down from 131 December.  Please share with primary provider.  BUN is 17 creatinine 0.77 which is actually better than in December when it was 0.89.  Both however are normal range. Sodium 141 potassium 4.8 and chloride 101 which is normal. Your calcium was up at 10.5.  Are you taking a calcium and vitamin D supplement?  If you are that sometimes can cause this.  Please share with primary provider as this was normal before at 10.2 and prior to that 9.7. Albumin normal at 4.1. Bilirubin slightly higher this time at 1.6 and previously 1.1.  Alkaline phosphatase was higher at 452 from 423 and AST was higher at 87 and ALT of 88 from prior levels. Are you on any new medicines that could be bumping the liver labs up?  Please let us know. I was not able to get you today but possibly that was due to the holiday weekend. Dr. Longoria

## 2023-04-10 ENCOUNTER — LAB OUTSIDE AN ENCOUNTER (OUTPATIENT)
Dept: URBAN - METROPOLITAN AREA CLINIC 86 | Facility: CLINIC | Age: 83
End: 2023-04-10

## 2023-04-12 ENCOUNTER — LAB OUTSIDE AN ENCOUNTER (OUTPATIENT)
Dept: URBAN - METROPOLITAN AREA TELEHEALTH 2 | Facility: TELEHEALTH | Age: 83
End: 2023-04-12

## 2023-04-12 ENCOUNTER — OFFICE VISIT (OUTPATIENT)
Dept: URBAN - METROPOLITAN AREA TELEHEALTH 2 | Facility: TELEHEALTH | Age: 83
End: 2023-04-12
Payer: MEDICARE

## 2023-04-12 VITALS — BODY MASS INDEX: 29.99 KG/M2 | HEIGHT: 65 IN | WEIGHT: 180 LBS

## 2023-04-12 DIAGNOSIS — E66.3 OVERWEIGHT: ICD-10-CM

## 2023-04-12 DIAGNOSIS — Z79.899 HIGH RISK MEDICATION USE: ICD-10-CM

## 2023-04-12 DIAGNOSIS — N28.1 RENAL CYST: ICD-10-CM

## 2023-04-12 DIAGNOSIS — K74.3 PBC (PRIMARY BILIARY CIRRHOSIS): ICD-10-CM

## 2023-04-12 DIAGNOSIS — K74.02 HEPATIC FIBROSIS, ADVANCED FIBROSIS: ICD-10-CM

## 2023-04-12 DIAGNOSIS — E11.9 DIABETES: ICD-10-CM

## 2023-04-12 DIAGNOSIS — K76.0 FATTY LIVER: ICD-10-CM

## 2023-04-12 DIAGNOSIS — Z13.820 SCREENING FOR OSTEOPOROSIS: ICD-10-CM

## 2023-04-12 DIAGNOSIS — R74.8 ELEVATED ALKALINE PHOSPHATASE LEVEL: ICD-10-CM

## 2023-04-12 DIAGNOSIS — L29.8 CHOLESTATIC PRURITUS: ICD-10-CM

## 2023-04-12 DIAGNOSIS — N20.0 RENAL STONE: ICD-10-CM

## 2023-04-12 DIAGNOSIS — R94.5 LIVER FUNCTION STUDY, ABNORMAL: ICD-10-CM

## 2023-04-12 PROCEDURE — 99214 OFFICE O/P EST MOD 30 MIN: CPT

## 2023-04-12 RX ORDER — URSODIOL 500 MG/1
TAKE 1.5 TABLET IN AM AND 1 IN PM (TAKE WITH FOOD) TABLET ORAL TWICE A DAY
Qty: 225 | Refills: 1 | Status: ACTIVE | COMMUNITY

## 2023-04-12 RX ORDER — AMLODIPINE BESYLATE 5 MG/1
TAKE 1 TABLET (5 MG) BY ORAL ROUTE ONCE DAILY TABLET ORAL 1
Qty: 0 | Refills: 0 | Status: ACTIVE | COMMUNITY
Start: 1900-01-01

## 2023-04-12 RX ORDER — URSODIOL 500 MG/1
TAKE 1.5 TABLET IN AM AND 1 IN PM (TAKE WITH FOOD) TABLET ORAL TWICE A DAY
Qty: 225 | Refills: 1

## 2023-04-12 RX ORDER — FAMOTIDINE 40 MG/1
1 TABLET AT BEDTIME TABLET, FILM COATED ORAL ONCE A DAY
Status: ACTIVE | COMMUNITY

## 2023-04-12 RX ORDER — MIRABEGRON 25 MG/1
1 TABLET TABLET, FILM COATED, EXTENDED RELEASE ORAL ONCE A DAY
Status: ACTIVE | COMMUNITY

## 2023-04-12 RX ORDER — GABAPENTIN 300 MG/1
1 CAPSULE CAPSULE ORAL TWICE A DAY
Status: ACTIVE | COMMUNITY

## 2023-04-12 RX ORDER — GLIPIZIDE 5 MG/1
0.5 TABLET 30 MINUTES BEFORE BREAKFAST TABLET ORAL ONCE A DAY
Status: ACTIVE | COMMUNITY

## 2023-04-12 NOTE — HPI-TODAY'S VISIT:
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  April 7 labs show sugar still elevated at 119 but down from 131 December.  Please share with primary provider.  BUN is 17 creatinine 0.77 which is actually better than in December when it was 0.89.  Both however are normal range. Sodium 141 potassium 4.8 and chloride 101 which is normal. Your calcium was up at 10.5.  Are you taking a calcium and vitamin D supplement?  If you are that sometimes can cause this.  Please share with primary provider as this was normal before at 10.2 and prior to that 9.7. Asked pt and he is on vit d and regular vitamin. Asked him to check with primary re that as his calcium was high. Albumin normal at 4.1. Bilirubin slightly higher this time at 1.6 and previously 1.1.  Alkaline phosphatase was higher at 452 from 423 and AST was higher at 87 and ALT of 88 from prior levels. Asked pt if he was ill and says has been a bit stressed as son ill.  March 7 labs show albumin stable at 4.5 and actually rising which is good to see.  Bilirubin down to 1.1 from 1.3 with a direct 0.68.  Alk phos slightly higher at 470 from 427 AST and ALT slightly higher at 93 and 98 from previous AST of 84 and ALT of 82. Not really see in the labs drop yet and still slightly going up.  Again just confirming no new meds or herbal remedies?  Please let us know.  February 6 labs show albumin 4.4 which is normal and in fact a little higher for you.  Total bilirubin still 1.3 as before on January 9.  Direct bilirubin 0.68.  Alk phos went up a little to 427 from 400 and AST 84 and ALT 82 with previous AST 63 and ALT 69.  Egd from June 9 from Dr Vann. Gastric body and gastric antrum with congestion, erosions, and erythema. Duodenum was normal. Small hiatal hernia present. LA grade B esophagitis seen in esophagus. 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 They mentioned to redo in 3-5 yrs. They ordered pantoprazole 40mg po qd.  December 21 2022 labs show INR normal at 1.0 which is good to see.  Is actually lower than back in September when it was 1.1. Sugar was elevated at 131 and as we mentioned before that may be related to you doing the labs not fasting.  Please share with primary providers. BUN of 17 creatinine 0.89 sodium 141 potassium 4.8 calcium 10.2 albumin 4.4 bilirubin 1.1 alkaline phosphatase went up a little to 423 from 323.  Any recent issues?  AST was 72 ALT of 70 up from 57 and 49. White blood cell count 4.1 hemoglobin 14.6 platelet count 151 MCV 98 neutrophils 2.2 lymphocytes 1.3. Called pt: took a medicine for his knees: meloxicam he took and has 8% risk to raise the labs. So that is what did that.  He did stop that.   September 19 labs show white blood cell count 3.6 hemoglobin 14.9 platelet count 145 down from 164.  Normal is from 150 up to 450.  1 year ago and September 3, 2021 the platelet count was about the same at 144. Neutrophils 1.6 lymphocytes 1.4 both normal.  Sugar was slightly up at 148 from previous 137.  BUN of 12 Cr 0.68.  Sodium 143 potassium 4.2 albumin 4.2 bilirubin normal at 0.6 and down from 0.9. Alkaline phosphatase down from 404 to 323.  AST slightly lower at 57 from 58.  ALT down to 49 from 58.  So they are moving in the right direction. INR normal at 1.1. Meld 7 and meld na 7 so remains low.  June 20 labs showed sugar elevated at 138.  BUN of 14 creatinine 0.79 sodium 140 potassium 4.6 chloride 102 CO2 27. Albumin 4.0 bilirubin 0.8 down from 1.1.  Urine bilirubin 0.38 down from 0.43.  Alkaline phosphatase 319 from 298 so it went up slightly.  AST went down to 45 and ALT to 46 from previous AST 60 and ALT 60. Overall the liver labs and the AST and ALT are lower and the alk phos slightly higher.    July 9 MRI sent in to me. Lower thorax shows minimal bibasilar atelectasis. Liver showed no significant fat or iron but does have chronic liver disease changes seen including lobar redistribution and nodular contour.  There are some reticular enhancement changes that are compatible with fibrosis.  No suspicious liver lesions seen.   Liver vessels are patent as expected. Small varices seen near the stomach and spleen.  Important to stay on top of egd surveillance for these issues. Recannulized periumbilical vein noted which is another sign of portal hypertension Spleen slightly enlarged at 13 cm. Pancreas and adrenal glands normal. Kidneys show some renal cysts and no further description was given. They did see your appendix and it appeared normal to them. They also saw some mild atherosclerotic disease of the abdominal aorta but without aneurysm.  Please share with primary providers to be aware of same. Degenerative changes seen of your spine. We may be able to look towards doing an ultrasound alternating with an MRI and we will discuss this further at your next visit.   March 15 labs show glucose elevated at 137 previously 143 and please share with primary provider.  BUN of 14 creatinine 0.86 sodium 139 potassium 4.8 calcium 10.0 albumin 4.2 bilirubin 0.9.  These other labs are normal range. Alkaline phosphatase did go up to 404 from previous 394 and prior 327.  AST came down from 63 to 58.  ALT came down from 68 to 58. Hill cell count 4.1 hemoglobin 14.9 platelet count 164 normal.  MCV 89. Neutrophils 1.4 and lymphocytes 1.9.  Weight is stable.  New medicines may be coming next year coming.  Ocaliva was on it had toxicity risk and not much lab benefit and being cirrhotic is higher risk and he is  getting older.  January 29 2022 MRI Lower thorax showed bibasilar atelectasis which means that the bases of the lungs were not fully expanded.  So metimes we can see that when you are in the MRI machine in that fixed position for a while.  Please share with primary provider. Liver showed no fat or iron but did have chronic liver disease changes including a nodular contour and lobar redistribution.  Some fibrosis changes seen.  No suspicious lesions.  Small left lobe hepatic cyst seen. Gallbladder normal with some unchanged mild intrahepatic bile duct dilation most pronounced in the periphery. Spleen was top normal in size at 13 cm. Pancreas, adrenal glands, and lymph nodes were normal. Stable renal cyst seen bilaterally. Mild atherosclerosis of the aorta seen without aneurysm.  Small varices near the esophagus and stomach so would need to stay on top of that EGD surveillance. Mild degenerative changes seen of the spine.  He did a scope with Dr Gabby Vann and take anticid. She did not mention any varices. Need that report.  He did the covid 19 series and March 5 2021. He did the covid booster.  Meds off ocaliva for summer 2020.   November 30 labs show INR normal at 1.0 which is good to see.  Glucose currently 154 elevated and previously was 143 and prior to that 129.  All 3 were elevated.  We have discussed this previously.  Please share with primary providers per their review. BUN of 13 creatinine 0.84 sodium 142 potassium 4.8 chloride 102 CO2 of 27 calcium elevated at 10.3. Asked if he is on any calcium supplements?  he said not taking any that he knows. Previously was normal at 10.2 and prior 10.1.  Please share with primary provider also.  Albumin 4.5 bilirubin 1.0 which is normal.  Alkaline phosphatase elevated at 394 previously 327 and prior 309.  AST 63 and ALT 68 which appeared to be slightly higher from prior AST of 47 ALT 45. Asked if he had any new meds and he says he is a vit d supplement. White blood cell count 4 hemoglobin 14.8 platelet count slightly low at 146 but improved from last time at 144.  MCV normal at 90.  Neutrophils 1.5 and lymphocytes 1.7. Meld low at 6 and meld na 6.    September 3 labs show INR remains perfectly normal at 1.0 and was previously 1.1. Glucose still remains elevated at 143 previously 129 and share with primary provider.  BUN of 13 creatinine 0.84 sodium 140 potassium 4.5 calcium 10.2 albumin 4.1 bilirubin 1.0.  Previously bilirubin 0.7 but remains normal again at 1.0.  Alkaline phosphatase slightly up at 327 from 309 previously 355.  AST down to 47 from 55 from prior 63.  ALT 45 down from 51 and prior 59 so those continue to drop.  White blood cell count 4.3 hemoglobin 14.9 platelet count 144 which is slightly lower from 162.  MCV 90.  Neutrophils normal at 1.4. Meld remains low at 6 and meld na 6.  Last mri in July and due for another one in Jan 2022.  He needs to do u.s and wants to do at [facility].  July 10 MRI shows the lower thorax to have bibasilar atelectasis. Liver shows morphologic changes of chronic liver disease with nodular contour and lobar redistribution.  They do see changes in the liver parenchyma that are suggestive of fibrosis.  More confluent fibrosis seen in the right lobe. No definite suspicious liver lesions seen.  You do have some perfusion anomalies which need to be rechecked in 6 months.  Scattered hepatic cysts are seen. Gallbladder was unremarkable and mild intrahepatic bile duct dilation was seen also more conspicuous in the right lobe versus the left.  This is felt to be compatible with your PBC diagnosis.  No extrahepatic bile duct dilation seen. Spleen top normal at 12.9 cm with small splenule in the left upper quadrant. Pancreas normal. Renal cysts were seen. Colon diverticulosis was noted as well. No inflammation however was seen in the colon. Mild paraesophageal perigastric and perisplenic varices were seen and moderate aortobiiliac atherosclerosis seen. Overall they felt you had signs of chronic liver disease but no evidence of any malignancy.   June 9 laboratories show white blood cell count 4.3 hemoglobin 13.8 platelet 162.  These are stable for you.  Platelet count is actually better than it was before at 151.  Neutrophils are stable at 1.6 and previously were 1.5.  Total bilirubin is down to 0.7 from 0.8.  Alkaline phosphatase is lower at 309 from 355.  AST down to 55 from 63 ALT 51 down from 59 and prior to that 68.  So the liver labs continue to be dropping.  Sodium 139 potassium 4.6 chloride 102 CO2 26 BUN 17 creatinine 0.93 glucose 129.  Sugar is actually lower than the previous time at 146. INR 1.1.   Meld score low at 7 and meld na 7.  April 27: alb 4.3 and tb 1.1 and db 0.43 and alk 298 and ast 60 and alt 60.  March 2: alk 355 and ast 63 and alt 59 so in fact alk phos lower now to 298 and ast and alt about the same.  8 weeks off labs March 2 and inr 1.0 and tb 0.8 and alk 355 and down from 376 and prior 392. ast 63 and alt 59 and down from 65 and 68 so little lower.  ca 10.0. na 140 and k 5.0 and glu 146 elevated. bun 18 and cr 0.9. wbc 3.9 and hg 14.9 and plat 151 and mcv 90.   4 weeks off labs: jan 25 2021 and inr normal 1.0 and so little lower now. tb 0.8 and lower from 0.9 and alk 376 from 392 so lower and ast 65 and alt 68 and prior ast 60 and alt 68 so not much of a change seen. na 143 and k 4.6 and cl 103 and co2 23 and cr 0.93. glu 128 elevated and mentioned to him. defer to local md re your sugars. cr 0.93. wbc 4.1 hg 14.5 and plat 138 (down from 165) and mcv 89.  Jan 23 mri: Morphologic changes of chronic liver disease without  hepatocellular carcinoma seen so that is good to note. Patent portal venous system with stigmata portal hypertension, including upper abdominal varices and splenomegaly noted so need to stay on top of egd surveillance. Right renal cyst with area of complexity on prior ultrasound demonstrates no septation, enhancement, or nodularity on this examination, compatible  with a simple cyst ( 4.7 x 6.2 x 7.8 cm.) This may have represented artifact on prior ultrasound. Continued attention on follow-up for liver disease was recommended. Liver showed no fat or iron. Scattered simple cysts in the left hepatic  lobe. Periesophageal, perigastric, perisplenic varices seen. Mild intrahepatic duct dilatation peripherally extending to the capsule, greater in the right hepatic lobe, compatible with primary biliary cirrhosis. No extrahepatic biliary ductal dilatation. Normal gallbladder. Spleen was enlarged measuring 13.1 cm in the craniocaudal dimension.Adjacent splenule. Pancreas normal. Mild atherosclerotic disease abdominal aorta without aneurysm. Mild degenerative changes of the spine.  Document Type: MRI Abdomen w/ + w/o Contrast Document Date: January 23, 2021 09:35 Document Status: Auth (Verified) Document Title: MRI Abdomen w/ + w/o Contrast Performed By: Jason Kapadia Verified By: Jason Kapadia on January 25, 2021 07:35 Encounter info: [id number], [facility], Single Visit OP, 1/23/2021 - 1/23/2021 * Final Report * Reason For Exam PBC//HEPATIC FIBROSIS//DIABETES//FATTY LIVER//RENAL CYST//ELEVATED ALKALINE PHOSPHATASE LEVEL REPORT EXAM: MRI Abdomen w/ + w/o Contrast CLINICAL INDICATION: PBC//HEPATIC FIBROSIS//DIABETES//FATTY LIVER//RENAL CYST//ELEVATED ALKALINE PHOSPHATASE LEVEL. TECHNIQUE: Multisequence, multiplanar MRI of the abdomen was performed without and with intravenous contrast. ESRC.2.7.3 CONTRAST: 16 cc of Prohance COMPARISON: Outside MRI dated 9/1/2020. Abdominal ultrasound dated 8/10/2020. FINDINGS: Lower Thorax: Normal. Liver: No fat or iron. Morphologic changes of chronic liver disease, including lobar redistribution and nodular contour. Delayed reticular enhancement of the hepatic parenchyma, compatible with fibrosis. More confluent fibrosis in the right hepatic lobe. Scattered simple cysts in the left hepatic lobe. No hepatocellular carcinoma. Hepatic vasculature is patent. Recannulized paraumbilical vein. Periesophageal, perigastric, perisplenic varices. Gallbladder/Biliary Tree: Mild intrahepatic duct dilatation peripherally extending to the capsule, greater in the right hepatic lobe, compatible primary biliary cirrhosis. No extrahepatic biliary ductal dilatation. Normal gallbladder. Spleen: Enlarged measuring 13.1 cm in the craniocaudal dimension. Adjacent splenule. Pancreas: Normal. Adrenal Glands: Normal. Kidneys/Ureters: Renal cysts. Right renal cyst with area of complexity on prior ultrasound demonstrates no septation, enhancement, or nodularity on this examination and measures 4.7 x 6.2 x 7.8 cm. Gastrointestinal: No bowel obstruction. Lymph Nodes: Normal. Vessels: Mild atherosclerotic disease abdominal aorta without aneurysm. Peritoneum/Retroperitoneum: Normal. Bones/Soft Tissues: Mild degenerative changes of the spine. IMPRESSION: 1. Morphologic changes of chronic liver disease without hepatocellular carcinoma. 2. Patent portal venous system with stigmata portal hypertension, including upper abdominal varices and splenomegaly. 3. Right renal cyst with area of complexity on prior ultrasound demonstrates no septation, enhancement, or nodularity on this examination, compatible with a simple cyst. This may have represented artifact on prior ultrasound. Continued attention on follow-up for liver disease. Signature Line *** Final ***  Electronically Signed By: Jason Kapadia on 01/25/2021 07:35 Dictated by: Jason Kapadia  2 weeks off ocaliva: Sanju 15: glu 117 elevated some but similar to nov 117 but down from 204 in dec 30. bun 16 and cr 0.91. na 141 and k 4.7 and cl 102 and ca 10.1 and alb 4.2 and tb 0.9 down from 1.1 in nov. ast 60 and alt 68 and so about the same as you can see vs other two labs and alk 392  about the same vs dec but little up from nov 354. wbc 4.1 hg 14.8 plat 165. inr 1.1 stable. So no dramatic changes seen yet that point.  Dec 30 2020 labs in middle: wbc 3.9 hg 15.1 plat 167. mcv 97. tb 0.9 down  from 1.1 and alk 399 slightly higher from 354. ast 64 and alt 66 and prior ast 63 and alt 60. na 139 and k 4.7 and cl 99 and co2 27. glu 204 elevated so that is newer issue as prior 117 and 134 so show to local md. bun 15 and cr 1.07 little up and prior 0.85 so little dry and could be linked to the sugars.  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  He is on urosodiol 500mg BID and had been on for years Ocaliva 10mg q other day due to pruritis and then off due to pruritis.  He weighs 183. Max dose 1100 to 1250 range 13-15mg/kg and we can ursodiol to 1.5 in the am and 1 in the evening. that may help and will avoid something more risky.  Itching much better on the gabapentin and off the ocaliva.  Nov 25 2020: wbc 4.2 hg 15 and plat 150 and mcv 90. Neutrophils 1.3 and prior 1.4 and lymphs 2.0 and prior 1.9. tb 1.1 and alk 354 and ast 63 and alt  60 and prior tb 0.9 and alk 343 and ast 55 and alt 61. So about the same. glu 117 and down from 134. bun 15 and cr 0.85 and na 139 and k 4.4 and cl 101 and co2 25. alb 4.2 normal.  Saw local mri: 9-1 20: nonenhancing renal cyst and no definite renal mass. Largest cyst right kidney 5.7cm. Not surprisingly they thought liver appeared to be cirrhotic. Also apparent nonenhancing cysts in the liver up to 10.6mm. Nonspecific biliary dilation in liver. Extrahepatic duct appears decompressed. Nonspecific splenomegaly seen and no significant varices seen. Left adrenal suspected adenoma. Appendix seemed somewhat prominent to then at 6.4mm but was probably similar to prior per them. No definite gallstones. Prior [facility] imaging liver coarsened. They recommended mri to better see possible complex renal cyst.  Saw urologist re the kidney issue was stable. They were to see him again in feb 2021 and had been changed.  The patient relates no significant family or personal history of liver disease. He states no history of new medications or alcohol use. The  patient reports a personal history of no other habits that could cause liver damage.  July 2020: tsh very low and show local md. T4 normal 8.5. inr 1.1 and tb 0.9 and alk 343 and ast 55 and alt 61 and tb 0.9 and alb 4.2 and ca 10.6 elevated and show local md also. na 144 and k 4.8. glu 134 elevated and maybe not fasting. cr 0.83. wbc 4.1 hg 15.3 plat 170.  Prior April ast 67 and alt 66 and alk 362 and tb 0.9 so liver labs little lower this last time despite the low dose and alk little lower also. calcium prior 10.1 and is not on any calcium supplements.  He says local doctor following thyroid and says he is doing better.  4- wbc 4.0 and hg 14.3 and plat 155 and neutrophils 1.2 little low. glu 130 and cr 0.78 and na 140 and k 4.4 and cl 101 and co2 22 and alb 4.3 and tb 0.9 and alk 362 and ast 67 and alt 66 and inr 1.1.  2-17-20 and wbc 4.1 hg 14.5 plat 153 and neutrophils 1.3 and glu 143 and cr 0.96 and na 140 and k 4.3 and cl 100 and co2 25 and alb 4.0 and tb 0.8 and alk 352 and ast 66 and alt 61 and inr 1.0.  12-16-19 and wbc 3.6 and hg 14.3 and plat 184 and neutrophils 1.3 and glu 124 and cr 0.86 and na 142 and k 5.0 and cl 102 and cl2 25 and alb 4.3 and tb 0.9 and alk 353 and ast 70 and alt 67 and inr 1.0.  Document Type: US Abdomen Doppler Complete Document Date: August 10, 2020 10:04 Document Status: Auth (Verified) Document Title: US Abdomen Doppler Complete Performed By: Jack Martin Verified By: Candelaria Rodriguez on August 10, 2020 11:41 Encounter info: [id number], [facility], Single Visit OP, 8/10/2020 - * Final Report * Reason For Exam primary billary cholangitis REPORT EXAM: US Abdomen Doppler Complete, US Abdomen Complete CLINICAL INDICATION: Primary billary cholangitis. TECHNIQUE: Grayscale, pulsed wave and color Doppler sonography of the upper abdomen were performed. ESRC.3.7.1 COMPARISON: None FINDINGS: Liver: Coarsened echotexture. No lesions. Bile Ducts: No dilated intrahepatic biliary radicles. Common duct measures 4 mm. Gallbladder: Normal. Wick's sign is negative. Pancreas: Partially obscured by overlying structures. Right Kidney: Measures 11.4 cm. Normal echogenicity. No hydronephrosis. 6.4 x 5.9 x 5.3 cm inferior pole cystic lesion with internal thickened septation versus areas of nodularity, indeterminate. Hyperechoic nonshadowing cortical focus measuring 0.5 x 0.4 x 0.2 cm.. Nonshadowing hyperechoic focus near the corticomedullary junction measuring 1.1 x 0.8 x 0.6 cm. Left Kidney: Measures 10.6 cm. Normal echogenicity. No hydronephrosis. Spleen: Measures 12.2 cm. Aorta: Normal caliber where imaged. IVC: Normal proximally, not imaged distally. Hepatic Veins: Normal. Portal Veins: Hepatopetal flow. Velocity of 27 cm/s in the main portal vein, 25 cm/s in the right portal vein, and 20 cm/s in the left portal vein. Hepatic Arteries: Peak systolic velocity 115 cm/s. Resistive index 0.77. Other: None. IMPRESSION: 1. Complex cystic lesion within the right kidney with thickened septation/areas of nodularity. Recommend MRI for further evaluation to exclude enhancing/solid components. Nonshadowing echogenic foci in the right kidney may represent nonobstructing stones although underlying lesions cannot be excluded. This can be evaluated at the time of MRI. 2. Coarsened hepatic echotexture can be seen with hepatic steatosis or chronic liver disease. No intra or extrahepatic biliary ductal dilation. Patent hepatic vasculature. The images were reviewed and interpreted by Candelaria Rodriguez MD. Signature Line *** Final *** Electronically Signed By: Candelaria Rodriguez on 08/10/2020 11:41 Dictated by: Jack Martin  12- u/s coarse hepatic ehcotexure and consistent with cirrhosis and 1.2cm hepatic cyst. Patent vessels and right renal cysts up to 5.7cm and some nonobstructing right renal calculi. Spleen 12.3cm.  Oct 14 2019 wbc 3.8 and hg 14.4 plat 177 and neutrophils 1.3 little low and glu 123 and cr 0.74 and na 141 and k 4.5 and cl 100 and co2 25 and alb 4.3 and tb 0.9 and alk 336 ast 67 and alt 61. hep b immune 40.5  Sept 18 2019 na 140 and k 4.7 and cl 103 and glu 121 and bun 12 and cr 0.79 alb 3,8 and tb 0,9 and ca 9.6 and ast 49 and alt 48 and alk 334 and a1c 5.7 and chol 228 and trg 52 and hdl 86 and ldl 132 nd psa 0.01 and wbc 4.2 and hg 144 and plat 183.  June 19 2019 and liver midly coarse and 1cm hepatic cyst and stable and gb not distended and no stones and bile ducts not dilated and spleen stable 12.3cm abd right kdiney 11cm and several right kidney cysts up to 6.2cm. Saw stone sin the right kidneuy. No hydronephrosios. Vessels patent. No change vs 2018.  Dec 2018 u.s with liver appearing fatty and patent vessels. Right kidney cyst 6.1x4.7x5.3cm stable abd simple. Liver cyst 1.1x0.8x1.1.cm. Similar to prior scan.  April 2019 labs wbc 4.3 hg 14.7 plat 183 and glu 134 and cr 0.85 and na 141 k 5.1 and tb 1.0 and alk 446 and ast 85 and alt 91.  Feb 2019 alk 418 and ast 79 and alt 81.  Dec 2018 alk 440 and tb 0.9 and db 0.48 and ast 76 and alt 92. Liver 76% and bone 20% and intestine 4%.  11/26/2018: wbc 4.6, hgb 14.7, plts 175,000, gluc 130, vet 0.89, na 142, k 4.0, alb 4.4, frances 2.9, t.bili 1.0, , AST 75, ASLT 80,INR 1.0, TSH 2.26  9/04/2018: wbc 4.6, hgb 14.4 plts 166,000, gluc 130, creat 0.95, na 143, k 4.5, alb 4.3, frances 3.1, t.bili 0.8, , AST56, ALT 62, INR 1.0, TSH 2.17,  6/05/2018: HBsAb 4.5, HBsAg neg, HBcAb neg, HAV pos  6/20/2018: liver echogenic suggesting mild fatty infiltration, simple cyst 1.1 x 0.7 x 1.3,, gallbladder unremarkable, mpv patent, cbd 4mm, right kidney simple cyst 5.6 x 4.8 x 5.2 cm, echoegenic focus 1.3cm consider kidney stone.  His cholesterol is checked by Dr. Sachin Dimas. He says cholesterol has been ok.   Reminded pt re bone density and needs to be following locally and he has not done this and reminded to get with local provider.  11-13-19: spine normal and t score 0.1 and z score 0.1/ femur neck -0.9 and -0.4 and femur total -1.0 and -0.9. He shared with primary md. he will check with primary md to redo as been 2 yrs.   Plan: 1. Stay  ursodol to 500mg 1.5am and 1 in the evening with food. 2. U.s at [facility] [place] at [facility]. 3. Pt will do labs in 3m and do telemed then as labs trends.   Stressed to pt the need for social distancing and strict handwashing and wearing a mask and to follow any other new or added CDC recommendations as this is an evolving target.  Duration of the visit was 31 min with 10 min of prep and 21 min by dox audio as he had internet issues  with greater than 50% of the time spent on coordination of care and in reviewing chart with the pt.

## 2023-04-17 ENCOUNTER — TELEPHONE ENCOUNTER (OUTPATIENT)
Dept: URBAN - METROPOLITAN AREA CLINIC 86 | Facility: CLINIC | Age: 83
End: 2023-04-17

## 2023-05-07 ENCOUNTER — TELEPHONE ENCOUNTER (OUTPATIENT)
Dept: URBAN - METROPOLITAN AREA CLINIC 86 | Facility: CLINIC | Age: 83
End: 2023-05-07

## 2023-05-07 NOTE — HPI-TODAY'S VISIT:
Dear Sweta Saucedo was able to obtain your ultrasound and gave it to me, which was dated January 9, 2023, and it mentions that your liver had increased geographic echogenicity with macro and micronodular contour but no definite lesions. Liver showed no dilated bile ducts and common bile duct 3.4 mm. Gallbladder normal. Pancreas normal. Right kidney measured 9.5 cm with normal echogenicity and no hydronephrosis and simple right renal cyst measuring 6.5 x 4 cm and was previously 6.4 x 6 cm.  This was better evaluated on the more recent MRI.  Nonshadowing echogenic focus seen in the mid renal pole measuring 1.4 x 1.1 cm and felt likely corresponding to renal sinus fat. Left kidney measure 11.4 cm with normal echogenicity. Spleen was normal at 12 cm. Liver vessels were patent. Given this you need to repeat the scan in 6 months which would be then in the July timeframe. We will put in the order for this to be done in July for you. Dr. Longoria

## 2023-05-11 ENCOUNTER — TELEPHONE ENCOUNTER (OUTPATIENT)
Dept: URBAN - METROPOLITAN AREA CLINIC 86 | Facility: CLINIC | Age: 83
End: 2023-05-11

## 2023-05-11 LAB
ALBUMIN: 4.1
ALKALINE PHOSPHATASE: 452
ALT (SGPT): 103
AST (SGOT): 105
BILIRUBIN, DIRECT: 1.37
BILIRUBIN, TOTAL: 2
PROTEIN, TOTAL: 7.1

## 2023-05-11 NOTE — HPI-TODAY'S VISIT:
Dear Roel Person, May 10 labs show albumin 4.1, bilirubin elevated 2.0 and was 1.37 on the direct.  Alk phos was 452 slightly lower.  AST was slightly higher at 105 from 93 and ALT was 103 up from 98. Called pt to see if he is on a new meds. He denies any herbs or teas. No illness. No antibiotics. No nsaids. Not eating any excess of any vegetable supplements. Wt  was 77.5 kg and so now 1162 is max so take 1000 mg and alternate 1250mg a day to get to dose that fits weight now. Dr Longoria

## 2023-05-24 ENCOUNTER — LAB OUTSIDE AN ENCOUNTER (OUTPATIENT)
Dept: URBAN - METROPOLITAN AREA TELEHEALTH 2 | Facility: TELEHEALTH | Age: 83
End: 2023-05-24

## 2023-06-08 ENCOUNTER — LAB OUTSIDE AN ENCOUNTER (OUTPATIENT)
Dept: URBAN - METROPOLITAN AREA CLINIC 86 | Facility: CLINIC | Age: 83
End: 2023-06-08

## 2023-06-10 ENCOUNTER — TELEPHONE ENCOUNTER (OUTPATIENT)
Dept: URBAN - METROPOLITAN AREA CLINIC 86 | Facility: CLINIC | Age: 83
End: 2023-06-10

## 2023-06-10 LAB
A/G RATIO: 1.4
ALBUMIN: 3.9
ALKALINE PHOSPHATASE: 491
ALT (SGPT): 94
AST (SGOT): 98
BASO (ABSOLUTE): 0
BASOS: 0
BILIRUBIN, TOTAL: 1.7
BUN/CREATININE RATIO: 17
BUN: 13
CALCIUM: 9.6
CARBON DIOXIDE, TOTAL: 25
CHLORIDE: 100
CREATININE: 0.75
EGFR: 90
EOS (ABSOLUTE): 0.3
EOS: 6
GLOBULIN, TOTAL: 2.8
GLUCOSE: 135
HEMATOCRIT: 41.3
HEMATOLOGY COMMENTS:: (no result)
HEMOGLOBIN: 13.6
IMMATURE CELLS: (no result)
IMMATURE GRANS (ABS): 0
IMMATURE GRANULOCYTES: 0
LYMPHS (ABSOLUTE): 1.6
LYMPHS: 42
MCH: 30.2
MCHC: 32.9
MCV: 92
MONOCYTES(ABSOLUTE): 0.5
MONOCYTES: 12
NEUTROPHILS (ABSOLUTE): 1.6
NEUTROPHILS: 40
NRBC: (no result)
PLATELETS: 147
POTASSIUM: 4.6
PROTEIN, TOTAL: 6.7
RBC: 4.5
RDW: 13
SODIUM: 141
WBC: 3.9

## 2023-06-10 NOTE — HPI-TODAY'S VISIT:
Dear Roel Trejo, June 8 labs show sugar elevated at 135 and previously 119.  Have you been fasting when you do these labs?  There clearly are remaining elevated. BUN of 13 creatinine 0.756 sodium 141 potassium 4.6 chloride 106 CO2 of 25 albumin 3.9. Bilirubin slightly higher at 1.7 from 1.6.  Alk phos slightly higher at 491 from 452.  AST up to 98 from 87 and ALT up to 94 from 88.  Unclear to me if the sugars could also be making the liver labs be trending higher?  Have you done a hemoglobin A1c with primary provider to see what that shows? WBC 3.9 hemoglobin 13.6 platelet count slightly low at 147 MCV 92 and neutrophils 1.6 and lymphocytes 1.6. I think that looking at and clarifying the issue as to your need for getting the sugars to be optimal may be a reasonable issue to focus on for you and seeing what that does may help lower these. It is known that  sugars if not controlled can impact many other liver diseases. Dr Longoria

## 2023-07-03 ENCOUNTER — LAB OUTSIDE AN ENCOUNTER (OUTPATIENT)
Dept: URBAN - METROPOLITAN AREA CLINIC 86 | Facility: CLINIC | Age: 83
End: 2023-07-03

## 2023-07-03 ENCOUNTER — LAB OUTSIDE AN ENCOUNTER (OUTPATIENT)
Dept: URBAN - METROPOLITAN AREA TELEHEALTH 2 | Facility: TELEHEALTH | Age: 83
End: 2023-07-03

## 2023-07-07 ENCOUNTER — TELEPHONE ENCOUNTER (OUTPATIENT)
Dept: URBAN - METROPOLITAN AREA CLINIC 86 | Facility: CLINIC | Age: 83
End: 2023-07-07

## 2023-07-07 LAB
A/G RATIO: 1.3
ALBUMIN: 3.9
ALKALINE PHOSPHATASE: 496
ALT (SGPT): 98
AST (SGOT): 108
BASO (ABSOLUTE): 0
BASOS: 1
BILIRUBIN, TOTAL: 1.8
BUN/CREATININE RATIO: 18
BUN: 15
CALCIUM: 9.8
CARBON DIOXIDE, TOTAL: 25
CHLORIDE: 102
CREATININE: 0.85
EGFR: 87
EOS (ABSOLUTE): 0.3
EOS: 8
GLOBULIN, TOTAL: 3.1
GLUCOSE: 124
HEMATOCRIT: 42.4
HEMATOLOGY COMMENTS:: (no result)
HEMOGLOBIN: 13.7
IMMATURE CELLS: (no result)
IMMATURE GRANS (ABS): 0
IMMATURE GRANULOCYTES: 1
INR: 1
LIPASE: 41
LYMPHS (ABSOLUTE): 1.5
LYMPHS: 39
MCH: 29.7
MCHC: 32.3
MCV: 92
MONOCYTES(ABSOLUTE): 0.4
MONOCYTES: 11
NEUTROPHILS (ABSOLUTE): 1.6
NEUTROPHILS: 40
NRBC: (no result)
PLATELETS: 159
POTASSIUM: 4.4
PROTEIN, TOTAL: 7
PROTHROMBIN TIME: 10.8
RBC: 4.61
RDW: 12.8
SODIUM: 138
WBC: 3.9

## 2023-07-07 NOTE — HPI-TODAY'S VISIT:
Dear Roel Trejo, July 6 labs show lipase normal at 41, INR normal at 1.0, glucose was elevated at 124 down from 135. BUN of 15 creatinine normal 0.85 sodium 138 potassium 4.4 calcium 9.8 albumin 3.9. Bilirubin slightly higher at 1.8 from 1.7.  Alkaline phosphatase 496 up slightly from 491.   from 98 and ALT 98 from 94. WBC 3.9, hemoglobin 13.7 plate count 159 MCV 92.  Neutrophils 1.6 and lymphocytes 1.5. Sadly for these labs our only option would be to consider trying the Ocaliva again which had side effects for you versus continuing to wait for new or medicines for PBC to come out which may be next year or the following.  There is a medicine from [place] that they are trying to get approved.  That would be the next 1 to come out. We will discuss at your next visit. Dr. Longoria

## 2023-07-10 ENCOUNTER — TELEPHONE ENCOUNTER (OUTPATIENT)
Dept: URBAN - METROPOLITAN AREA CLINIC 86 | Facility: CLINIC | Age: 83
End: 2023-07-10

## 2023-07-10 NOTE — HPI-TODAY'S VISIT:
Khanh Sevilla Person, July 10 ultrasound shows liver coarsened in its echotexture with blunting of the liver edges and mildly nodular contour.  No lesions seen. No dilated bile ducts seen and common bile duct 4.6 mm. Small gallbladder polyp seen measuring 3 x 3 x 3 mm.  Wick sign negative. Of note, prior u.s in Jan 2023 did not show this and so we need to follow this over time. Typically the concern is if this is a gallbladder polyp and not a stone is if it grows to 10 mm or more in size. Pancreas not seen due to overlying structures. Right kidney 10.4 cm with no hydronephrosis but with a simple right renal cyst measuring 6.4 x 6.7 x 3.9 cm that was previously 6.5 x 4.0 x 5.5 cm.  Punctate echogenic focus seen in the mid right kidney measuring 0.5 x 0.7 x 0.4 cm favored to be a stone. Left kidney 10.6 cm with no hydronephrosis. Spleen 13.2 cm. Liver vessels were patent which was good to see. In summary, they see chronic liver disease but without any sonographically evident lesions and patent vessels. They feel that this is a small gallbladder echogenic focus measuring 3 mm that they think is a pedunculated polyp versus less likely a stone. We will need to monitor this and look for any changes over time. Will discuss more at the visit. Dr Longoria

## 2023-07-20 ENCOUNTER — TELEPHONE ENCOUNTER (OUTPATIENT)
Dept: URBAN - METROPOLITAN AREA CLINIC 86 | Facility: CLINIC | Age: 83
End: 2023-07-20

## 2023-07-20 ENCOUNTER — OFFICE VISIT (OUTPATIENT)
Dept: URBAN - METROPOLITAN AREA TELEHEALTH 2 | Facility: TELEHEALTH | Age: 83
End: 2023-07-20
Payer: MEDICARE

## 2023-07-20 VITALS — BODY MASS INDEX: 27.49 KG/M2 | HEIGHT: 65 IN | WEIGHT: 165 LBS

## 2023-07-20 DIAGNOSIS — E11.9 DIABETES: ICD-10-CM

## 2023-07-20 DIAGNOSIS — K76.0 FATTY LIVER: ICD-10-CM

## 2023-07-20 DIAGNOSIS — K74.02 HEPATIC FIBROSIS, ADVANCED FIBROSIS: ICD-10-CM

## 2023-07-20 DIAGNOSIS — K74.60 ADVANCED CIRRHOSIS: ICD-10-CM

## 2023-07-20 PROBLEM — 19943007: Status: ACTIVE | Noted: 2023-07-20

## 2023-07-20 PROCEDURE — 99214 OFFICE O/P EST MOD 30 MIN: CPT

## 2023-07-20 RX ORDER — URSODIOL 500 MG/1
TAKE 1.5 TABLET IN AM AND 1 IN PM (TAKE WITH FOOD) TABLET ORAL TWICE A DAY
Qty: 225 | Refills: 1 | Status: ACTIVE | COMMUNITY

## 2023-07-20 RX ORDER — MIRABEGRON 25 MG/1
1 TABLET TABLET, FILM COATED, EXTENDED RELEASE ORAL ONCE A DAY
Status: ACTIVE | COMMUNITY

## 2023-07-20 RX ORDER — AMLODIPINE BESYLATE 5 MG/1
TAKE 1 TABLET (5 MG) BY ORAL ROUTE ONCE DAILY TABLET ORAL 1
Qty: 0 | Refills: 0 | Status: ACTIVE | COMMUNITY
Start: 1900-01-01

## 2023-07-20 RX ORDER — URSODIOL 500 MG/1
TAKE 1.5 IN AM AND 1 IN PM ALTERNATING 1 PO TWICE A DAY TABLET ORAL
Qty: 202.5 TABLETS | Refills: 1

## 2023-07-20 RX ORDER — FAMOTIDINE 40 MG/1
1 TABLET AT BEDTIME TABLET, FILM COATED ORAL ONCE A DAY
Status: ACTIVE | COMMUNITY

## 2023-07-20 RX ORDER — GABAPENTIN 300 MG/1
1 CAPSULE CAPSULE ORAL TWICE A DAY
Status: ACTIVE | COMMUNITY

## 2023-07-20 RX ORDER — GLIPIZIDE 5 MG/1
0.5 TABLET 30 MINUTES BEFORE BREAKFAST TABLET ORAL ONCE A DAY
Status: DISCONTINUED | COMMUNITY

## 2023-07-20 NOTE — HPI-TODAY'S VISIT:
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  July 10 ultrasound shows liver coarsened in its echotexture with blunting of the liver edges and mildly nodular contour.  No lesions seen. No dilated bile ducts seen and common bile duct 4.6 mm. Small gallbladder polyp seen measuring 3 x 3 x 3 mm.  Wick sign negative. Of note, prior u.s in Jan 2023 did not show this and so we need to follow this over time. Typically the concern is if this is a gallbladder polyp and not a stone is if it grows to 10 mm or more in size. Pancreas not seen due to overlying structures. Right kidney 10.4 cm with no hydronephrosis but with a simple right renal cyst measuring 6.4 x 6.7 x 3.9 cm that was previously 6.5 x 4.0 x 5.5 cm.  Punctate echogenic focus seen in the mid right kidney measuring 0.5 x 0.7 x 0.4 cm favored to be a stone. Left kidney 10.6 cm with no hydronephrosis. Spleen 13.2 cm. Liver vessels were patent which was good to see. In summary, they see chronic liver disease but without any sonographically evident lesions and patent vessels. They feel that this is a small gallbladder echogenic focus measuring 3 mm that they think is a pedunculated polyp versus less likely a stone. We will need to monitor this and look for any changes over time.  July 6 labs show lipase normal at 41, INR normal at 1.0, glucose was elevated at 124 down from 135. BUN of 15 creatinine normal 0.85 sodium 138 potassium 4.4 calcium 9.8 albumin 3.9. Bilirubin slightly higher at 1.8 from 1.7.  Alkaline phosphatase 496 up slightly from 491.   from 98 and ALT 98 from 94. WBC 3.9, hemoglobin 13.7 platelet count 159 MCV 92.  Neutrophils 1.6 and lymphocytes 1.5.  Sadly for these labs our only option would be to consider trying the Ocaliva again which had side effects for you versus continuing to wait for new or medicines for PBC to come out which may be next year or the following.  There is a medicine from [place] that they are trying to get approved.  That would be the next 1 to come out.  June 8 labs show sugar elevated at 135 and previously 119.  Have you been fasting when you do these labs?  There clearly are remaining elevated. BUN of 13 creatinine 0.756 sodium 141 potassium 4.6 chloride 106 CO2 of 25 albumin 3.9. Bilirubin slightly higher at 1.7 from 1.6.  Alk phos slightly higher at 491 from 452.  AST up to 98 from 87 and ALT up to 94 from 88.  Unclear to me if the sugars could also be making the liver labs be trending higher?  Have you done a hemoglobin A1c with primary provider to see what that shows? WBC 3.9 hemoglobin 13.6 platelet count slightly low at 147 MCV 92 and neutrophils 1.6 and lymphocytes 1.6. I think that looking at and clarifying the issue as to your need for getting the sugars to be optimal may be a reasonable issue to focus on for you and seeing what that does may help lower these. It is known that  sugars if not controlled can impact many other liver diseases.  May 10 labs show albumin 4.1, bilirubin elevated 2.0 and was 1.37 on the direct.  Alk phos was 452 slightly lower.  AST was slightly higher at 105 from 93 and ALT was 103 up from 98. Called pt to see if he is on a new meds. He denies any herbs or teas. No illness. No antibiotics. No nsaids. Not eating any excess of any vegetable supplements. Wt  was 77.5 kg and so now 1165 is max 1000 alternate 1250mg a day to get to dose that fits weight now.  Sweta was able to obtain your ultrasound and gave it to me, which was dated January 9, 2023, and it mentions that your liver had increased geographic echogenicity with macro and micronodular contour but no definite lesions. Liver showed no dilated bile ducts and common bile duct 3.4 mm. Gallbladder normal. Pancreas normal. Right kidney measured 9.5 cm with normal echogenicity and no hydronephrosis and simple right renal cyst measuring 6.5 x 4 cm and was previously 6.4 x 6 cm.  This was better evaluated on the more recent MRI.  Nonshadowing echogenic focus seen in the mid renal pole measuring 1.4 x 1.1 cm and felt likely corresponding to renal sinus fat. Left kidney measure 11.4 cm with normal echogenicity. Spleen was normal at 12 cm. Liver vessels were patent. Given this you need to repeat the scan in 6 months which would be then in the July timeframe. We will put in the order for this to be done in July for you.   April 7 labs show sugar still elevated at 119 but down from 131 December.  Please share with primary provider.  BUN is 17 creatinine 0.77 which is actually better than in December when it was 0.89.  Both however are normal range. Sodium 141 potassium 4.8 and chloride 101 which is normal. Your calcium was up at 10.5.  Are you taking a calcium and vitamin D supplement?  If you are that sometimes can cause this.  Please share with primary provider as this was normal before at 10.2 and prior to that 9.7. Asked pt and he is on vit d and regular vitamin. Asked him to check with primary re that as his calcium was high. Albumin normal at 4.1. Bilirubin slightly higher this time at 1.6 and previously 1.1.  Alkaline phosphatase was higher at 452 from 423 and AST was higher at 87 and ALT of 88 from prior levels. Asked pt if he was ill and says has been a bit stressed as son ill.  March 7 labs show albumin stable at 4.5 and actually rising which is good to see.  Bilirubin down to 1.1 from 1.3 with a direct 0.68.  Alk phos slightly higher at 470 from 427 AST and ALT slightly higher at 93 and 98 from previous AST of 84 and ALT of 82. Not really see in the labs drop yet and still slightly going up.  Again just confirming no new meds or herbal remedies?  Please let us know.  February 6 labs show albumin 4.4 which is normal and in fact a little higher for you.  Total bilirubin still 1.3 as before on January 9.  Direct bilirubin 0.68.  Alk phos went up a little to 427 from 400 and AST 84 and ALT 82 with previous AST 63 and ALT 69.  Egd from June 9 from Dr Vann. Told not due yet. Gastric body and gastric antrum with congestion, erosions, and erythema. Duodenum was normal. Small hiatal hernia present. LA grade B esophagitis seen in esophagus. 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 They mentioned to redo in 3-5 yrs? They ordered pantoprazole 40mg po qd.  December 21 2022 labs show INR normal at 1.0 which is good to see.  Is actually lower than back in September when it was 1.1. Sugar was elevated at 131 and as we mentioned before that may be related to you doing the labs not fasting.  Please share with primary providers. BUN of 17 creatinine 0.89 sodium 141 potassium 4.8 calcium 10.2 albumin 4.4 bilirubin 1.1 alkaline phosphatase went up a little to 423 from 323.  Any recent issues?  AST was 72 ALT of 70 up from 57 and 49. White blood cell count 4.1 hemoglobin 14.6 platelet count 151 MCV 98 neutrophils 2.2 lymphocytes 1.3. Called pt: took a medicine for his knees: meloxicam he took and has 8% risk to raise the labs. So that is what did that.  He did stop that.   September 19 labs show white blood cell count 3.6 hemoglobin 14.9 platelet count 145 down from 164.  Normal is from 150 up to 450.  1 year ago and September 3, 2021 the platelet count was about the same at 144. Neutrophils 1.6 lymphocytes 1.4 both normal.  Sugar was slightly up at 148 from previous 137.  BUN of 12 Cr 0.68.  Sodium 143 potassium 4.2 albumin 4.2 bilirubin normal at 0.6 and down from 0.9. Alkaline phosphatase down from 404 to 323.  AST slightly lower at 57 from 58.  ALT down to 49 from 58.  So they are moving in the right direction. INR normal at 1.1. Meld 7 and meld na 7 so remains low.  June 20 labs showed sugar elevated at 138.  BUN of 14 creatinine 0.79 sodium 140 potassium 4.6 chloride 102 CO2 27. Albumin 4.0 bilirubin 0.8 down from 1.1.  Urine bilirubin 0.38 down from 0.43.  Alkaline phosphatase 319 from 298 so it went up slightly.  AST went down to 45 and ALT to 46 from previous AST 60 and ALT 60. Overall the liver labs and the AST and ALT are lower and the alk phos slightly higher.  July 9 MRI sent in to me. Lower thorax shows minimal bibasilar atelectasis. Liver showed no significant fat or iron but does have chronic liver disease changes seen including lobar redistribution and nodular contour.  There are some reticular enhancement changes that are compatible with fibrosis.  No suspicious liver lesions seen.   Liver vessels are patent as expected. Small varices seen near the stomach and spleen.  Important to stay on top of egd surveillance for these issues. Recannulized periumbilical vein noted which is another sign of portal hypertension Spleen slightly enlarged at 13 cm. Pancreas and adrenal glands normal. Kidneys show some renal cysts and no further description was given. They did see your appendix and it appeared normal to them. They also saw some mild atherosclerotic disease of the abdominal aorta but without aneurysm.  Please share with primary providers to be aware of same. Degenerative changes seen of your spine. We may be able to look towards doing an ultrasound alternating with an MRI and we will discuss this further at your next visit.   March 15 labs show glucose elevated at 137 previously 143 and please share with primary provider.  BUN of 14 creatinine 0.86 sodium 139 potassium 4.8 calcium 10.0 albumin 4.2 bilirubin 0.9.  These other labs are normal range. Alkaline phosphatase did go up to 404 from previous 394 and prior 327.  AST came down from 63 to 58.  ALT came down from 68 to 58. Hlil cell count 4.1 hemoglobin 14.9 platelet count 164 normal.  MCV 89. Neutrophils 1.4 and lymphocytes 1.9.  Ocaliva was on it had toxicity risk and not much lab benefit and being cirrhotic is higher risk and he is  getting older.  January 29 2022 MRI Lower thorax showed bibasilar atelectasis which means that the bases of the lungs were not fully expanded.  So metimes we can see that when you are in the MRI machine in that fixed position for a while.  Please share with primary provider. Liver showed no fat or iron but did have chronic liver disease changes including a nodular contour and lobar redistribution.  Some fibrosis changes seen.  No suspicious lesions.  Small left lobe hepatic cyst seen. Gallbladder normal with some unchanged mild intrahepatic bile duct dilation most pronounced in the periphery. Spleen was top normal in size at 13 cm. Pancreas, adrenal glands, and lymph nodes were normal. Stable renal cyst seen bilaterally. Mild atherosclerosis of the aorta seen without aneurysm.  Small varices near the esophagus and stomach so would need to stay on top of that EGD surveillance. Mild degenerative changes seen of the spine.  He did the covid 19 series and March 5 2021. He did the covid booster.  Meds off ocaliva for summer 2020.   November 30 labs show INR normal at 1.0 which is good to see.  Glucose currently 154 elevated and previously was 143 and prior to that 129.  All 3 were elevated.  We have discussed this previously.  Please share with primary providers per their review. BUN of 13 creatinine 0.84 sodium 142 potassium 4.8 chloride 102 CO2 of 27 calcium elevated at 10.3. Asked if he is on any calcium supplements?  he said not taking any that he knows. Previously was normal at 10.2 and prior 10.1.  Please share with primary provider also.  Albumin 4.5 bilirubin 1.0 which is normal.  Alkaline phosphatase elevated at 394 previously 327 and prior 309.  AST 63 and ALT 68 which appeared to be slightly higher from prior AST of 47 ALT 45. Asked if he had any new meds and he says he is a vit d supplement. White blood cell count 4 hemoglobin 14.8 platelet count slightly low at 146 but improved from last time at 144.  MCV normal at 90.  Neutrophils 1.5 and lymphocytes 1.7. Meld low at 6 and meld na 6.    September 3 labs show INR remains perfectly normal at 1.0 and was previously 1.1. Glucose still remains elevated at 143 previously 129 and share with primary provider.  BUN of 13 creatinine 0.84 sodium 140 potassium 4.5 calcium 10.2 albumin 4.1 bilirubin 1.0.  Previously bilirubin 0.7 but remains normal again at 1.0.  Alkaline phosphatase slightly up at 327 from 309 previously 355.  AST down to 47 from 55 from prior 63.  ALT 45 down from 51 and prior 59 so those continue to drop.  White blood cell count 4.3 hemoglobin 14.9 platelet count 144 which is slightly lower from 162.  MCV 90.  Neutrophils normal at 1.4. Meld remains low at 6 and meld na 6.  July 10 MRI shows the lower thorax to have bibasilar atelectasis. Liver shows morphologic changes of chronic liver disease with nodular contour and lobar redistribution.  They do see changes in the liver parenchyma that are suggestive of fibrosis.  More confluent fibrosis seen in the right lobe. No definite suspicious liver lesions seen.  You do have some perfusion anomalies which need to be rechecked in 6 months.  Scattered hepatic cysts are seen. Gallbladder was unremarkable and mild intrahepatic bile duct dilation was seen also more conspicuous in the right lobe versus the left.  This is felt to be compatible with your PBC diagnosis.  No extrahepatic bile duct dilation seen. Spleen top normal at 12.9 cm with small splenule in the left upper quadrant. Pancreas normal. Renal cysts were seen. Colon diverticulosis was noted as well. No inflammation however was seen in the colon. Mild paraesophageal perigastric and perisplenic varices were seen and moderate aortobiiliac atherosclerosis seen. Overall they felt you had signs of chronic liver disease but no evidence of any malignancy.   June 9 laboratories show white blood cell count 4.3 hemoglobin 13.8 platelet 162.  These are stable for you.  Platelet count is actually better than it was before at 151.  Neutrophils are stable at 1.6 and previously were 1.5.  Total bilirubin is down to 0.7 from 0.8.  Alkaline phosphatase is lower at 309 from 355.  AST down to 55 from 63 ALT 51 down from 59 and prior to that 68.  So the liver labs continue to be dropping.  Sodium 139 potassium 4.6 chloride 102 CO2 26 BUN 17 creatinine 0.93 glucose 129.  Sugar is actually lower than the previous time at 146. INR 1.1.   Meld score low at 7 and meld na 7.  April 27: alb 4.3 and tb 1.1 and db 0.43 and alk 298 and ast 60 and alt 60.  March 2: alk 355 and ast 63 and alt 59 so in fact alk phos lower now to 298 and ast and alt about the same.  8 weeks off labs March 2 and inr 1.0 and tb 0.8 and alk 355 and down from 376 and prior 392. ast 63 and alt 59 and down from 65 and 68 so little lower.  ca 10.0. na 140 and k 5.0 and glu 146 elevated. bun 18 and cr 0.9. wbc 3.9 and hg 14.9 and plat 151 and mcv 90.   4 weeks off labs: jan 25 2021 and inr normal 1.0 and so little lower now. tb 0.8 and lower from 0.9 and alk 376 from 392 so lower and ast 65 and alt 68 and prior ast 60 and alt 68 so not much of a change seen. na 143 and k 4.6 and cl 103 and co2 23 and cr 0.93. glu 128 elevated and mentioned to him. defer to local md re your sugars. cr 0.93. wbc 4.1 hg 14.5 and plat 138 (down from 165) and mcv 89.  Jan 23 mri: Morphologic changes of chronic liver disease without  hepatocellular carcinoma seen so that is good to note. Patent portal venous system with stigmata portal hypertension, including upper abdominal varices and splenomegaly noted so need to stay on top of egd surveillance. Right renal cyst with area of complexity on prior ultrasound demonstrates no septation, enhancement, or nodularity on this examination, compatible  with a simple cyst ( 4.7 x 6.2 x 7.8 cm.) This may have represented artifact on prior ultrasound. Continued attention on follow-up for liver disease was recommended. Liver showed no fat or iron. Scattered simple cysts in the left hepatic  lobe. Periesophageal, perigastric, perisplenic varices seen. Mild intrahepatic duct dilatation peripherally extending to the capsule, greater in the right hepatic lobe, compatible with primary biliary cirrhosis. No extrahepatic biliary ductal dilatation. Normal gallbladder. Spleen was enlarged measuring 13.1 cm in the craniocaudal dimension.Adjacent splenule. Pancreas normal. Mild atherosclerotic disease abdominal aorta without aneurysm. Mild degenerative changes of the spine.  Document Type: MRI Abdomen w/ + w/o Contrast Document Date: January 23, 2021 09:35 Document Status: Auth (Verified) Document Title: MRI Abdomen w/ + w/o Contrast Performed By: Jason Kapadia Verified By: Jason Kapadia on January 25, 2021 07:35 Encounter info: [id number], [facility], later [facility], Single Visit OP, 1/23/2021 - 1/23/2021 * Final Report * Reason For Exam PBC//HEPATIC FIBROSIS//DIABETES//FATTY LIVER//RENAL CYST//ELEVATED ALKALINE PHOSPHATASE LEVEL REPORT EXAM: MRI Abdomen w/ + w/o Contrast CLINICAL INDICATION: PBC//HEPATIC FIBROSIS//DIABETES//FATTY LIVER//RENAL CYST//ELEVATED ALKALINE PHOSPHATASE LEVEL. TECHNIQUE: Multisequence, multiplanar MRI of the abdomen was performed without and with intravenous contrast. ESRC.2.7.3 CONTRAST: 16 cc of Prohance COMPARISON: Outside MRI dated 9/1/2020. Abdominal ultrasound dated 8/10/2020. FINDINGS: Lower Thorax: Normal. Liver: No fat or iron. Morphologic changes of chronic liver disease, including lobar redistribution and nodular contour. Delayed reticular enhancement of the hepatic parenchyma, compatible with fibrosis. More confluent fibrosis in the right hepatic lobe. Scattered simple cysts in the left hepatic lobe. No hepatocellular carcinoma. Hepatic vasculature is patent. Recannulized paraumbilical vein. Periesophageal, perigastric, perisplenic varices. Gallbladder/Biliary Tree: Mild intrahepatic duct dilatation peripherally extending to the capsule, greater in the right hepatic lobe, compatible primary biliary cirrhosis. No extrahepatic biliary ductal dilatation. Normal gallbladder. Spleen: Enlarged measuring 13.1 cm in the craniocaudal dimension. Adjacent splenule. Pancreas: Normal. Adrenal Glands: Normal. Kidneys/Ureters: Renal cysts. Right renal cyst with area of complexity on prior ultrasound demonstrates no septation, enhancement, or nodularity on this examination and measures 4.7 x 6.2 x 7.8 cm. Gastrointestinal: No bowel obstruction. Lymph Nodes: Normal. Vessels: Mild atherosclerotic disease abdominal aorta without aneurysm. Peritoneum/Retroperitoneum: Normal. Bones/Soft Tissues: Mild degenerative changes of the spine. IMPRESSION: 1. Morphologic changes of chronic liver disease without hepatocellular carcinoma. 2. Patent portal venous system with stigmata portal hypertension, including upper abdominal varices and splenomegaly. 3. Right renal cyst with area of complexity on prior ultrasound demonstrates no septation, enhancement, or nodularity on this examination, compatible with a simple cyst. This may have represented artifact on prior ultrasound. Continued attention on follow-up for liver disease. Signature Line *** Final ***  Electronically Signed By: Jason Kapadia on 01/25/2021 07:35 Dictated by: Jason Kapadia  2 weeks off ocaliva: Sanju 15: glu 117 elevated some but similar to nov 117 but down from 204 in dec 30. bun 16 and cr 0.91. na 141 and k 4.7 and cl 102 and ca 10.1 and alb 4.2 and tb 0.9 down from 1.1 in nov. ast 60 and alt 68 and so about the same as you can see vs other two labs and alk 392  about the same vs dec but little up from nov 354. wbc 4.1 hg 14.8 plat 165. inr 1.1 stable. So no dramatic changes seen yet that point.  Dec 30 2020 labs in middle: wbc 3.9 hg 15.1 plat 167. mcv 97. tb 0.9 down  from 1.1 and alk 399 slightly higher from 354. ast 64 and alt 66 and prior ast 63 and alt 60. na 139 and k 4.7 and cl 99 and co2 27. glu 204 elevated so that is newer issue as prior 117 and 134 so show to local md. bun 15 and cr 1.07 little up and prior 0.85 so little dry and could be linked to the sugars.  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  He is on urosodiol 500mg BID and had been on for years Ocaliva 10mg q other day due to pruritis and then off due to pruritis.  He weighs 183. Max dose 1100 to 1250 range 13-15mg/kg and we can ursodiol to 1.5 in the am and 1 in the evening. that may help and will avoid something more risky.  Itching much better on the gabapentin and off the ocaliva.  Nov 25 2020: wbc 4.2 hg 15 and plat 150 and mcv 90. Neutrophils 1.3 and prior 1.4 and lymphs 2.0 and prior 1.9. tb 1.1 and alk 354 and ast 63 and alt  60 and prior tb 0.9 and alk 343 and ast 55 and alt 61. So about the same. glu 117 and down from 134. bun 15 and cr 0.85 and na 139 and k 4.4 and cl 101 and co2 25. alb 4.2 normal.  Saw local mri: 9-1 20: nonenhancing renal cyst and no definite renal mass. Largest cyst right kidney 5.7cm. Not surprisingly they thought liver appeared to be cirrhotic. Also apparent nonenhancing cysts in the liver up to 10.6mm. Nonspecific biliary dilation in liver. Extrahepatic duct appears decompressed. Nonspecific splenomegaly seen and no significant varices seen. Left adrenal suspected adenoma. Appendix seemed somewhat prominent to then at 6.4mm but was probably similar to prior per them. No definite gallstones. Prior [place] imaging liver coarsened. They recommended mri to better see possible complex renal cyst.  Saw urologist re the kidney issue was stable. They were to see him again in feb 2021 and had been changed.  The patient relates no significant family or personal history of liver disease. He states no history of new medications or alcohol use. The  patient reports a personal history of no other habits that could cause liver damage.  July 2020: tsh very low and show local md. T4 normal 8.5. inr 1.1 and tb 0.9 and alk 343 and ast 55 and alt 61 and tb 0.9 and alb 4.2 and ca 10.6 elevated and show local md also. na 144 and k 4.8. glu 134 elevated and maybe not fasting. cr 0.83. wbc 4.1 hg 15.3 plat 170.  Prior April ast 67 and alt 66 and alk 362 and tb 0.9 so liver labs little lower this last time despite the low dose and alk little lower also. calcium prior 10.1 and is not on any calcium supplements.  He says local doctor following thyroid and says he is doing better.  4- wbc 4.0 and hg 14.3 and plat 155 and neutrophils 1.2 little low. glu 130 and cr 0.78 and na 140 and k 4.4 and cl 101 and co2 22 and alb 4.3 and tb 0.9 and alk 362 and ast 67 and alt 66 and inr 1.1.  2-17-20 and wbc 4.1 hg 14.5 plat 153 and neutrophils 1.3 and glu 143 and cr 0.96 and na 140 and k 4.3 and cl 100 and co2 25 and alb 4.0 and tb 0.8 and alk 352 and ast 66 and alt 61 and inr 1.0.  12-16-19 and wbc 3.6 and hg 14.3 and plat 184 and neutrophils 1.3 and glu 124 and cr 0.86 and na 142 and k 5.0 and cl 102 and cl2 25 and alb 4.3 and tb 0.9 and alk 353 and ast 70 and alt 67 and inr 1.0.  Document Type: US Abdomen Doppler Complete Document Date: August 10, 2020 10:04 Document Status: Auth (Verified) Document Title: US Abdomen Doppler Complete Performed By: Jack Martin Verified By: Candelaria Rodriguez on August 10, 2020 11:41 Encounter info: [id number], [facility], Single Visit OP, 8/10/2020 - * Final Report * Reason For Exam primary billary cholangitis REPORT EXAM: US Abdomen Doppler Complete, US Abdomen Complete CLINICAL INDICATION: Primary billary cholangitis. TECHNIQUE: Grayscale, pulsed wave and color Doppler sonography of the upper abdomen were performed. ESRC.3.7.1 COMPARISON: None FINDINGS: Liver: Coarsened echotexture. No lesions. Bile Ducts: No dilated intrahepatic biliary radicles. Common duct measures 4 mm. Gallbladder: Normal. Wick's sign is negative. Pancreas: Partially obscured by overlying structures. Right Kidney: Measures 11.4 cm. Normal echogenicity. No hydronephrosis. 6.4 x 5.9 x 5.3 cm inferior pole cystic lesion with internal thickened septation versus areas of nodularity, indeterminate. Hyperechoic nonshadowing cortical focus measuring 0.5 x 0.4 x 0.2 cm.. Nonshadowing hyperechoic focus near the corticomedullary junction measuring 1.1 x 0.8 x 0.6 cm. Left Kidney: Measures 10.6 cm. Normal echogenicity. No hydronephrosis. Spleen: Measures 12.2 cm. Aorta: Normal caliber where imaged. IVC: Normal proximally, not imaged distally. Hepatic Veins: Normal. Portal Veins: Hepatopetal flow. Velocity of 27 cm/s in the main portal vein, 25 cm/s in the right portal vein, and 20 cm/s in the left portal vein. Hepatic Arteries: Peak systolic velocity 115 cm/s. Resistive index 0.77. Other: None. IMPRESSION: 1. Complex cystic lesion within the right kidney with thickened septation/areas of nodularity. Recommend MRI for further evaluation to exclude enhancing/solid components. Nonshadowing echogenic foci in the right kidney may represent nonobstructing stones although underlying lesions cannot be excluded. This can be evaluated at the time of MRI. 2. Coarsened hepatic echotexture can be seen with hepatic steatosis or chronic liver disease. No intra or extrahepatic biliary ductal dilation. Patent hepatic vasculature. The images were reviewed and interpreted by Candelaria Rodriguez MD. Signature Line *** Final *** Electronically Signed By: Candelaria Rodriguez on 08/10/2020 11:41 Dictated by: Jack Martin  12- u/s coarse hepatic ehcotexure and consistent with cirrhosis and 1.2cm hepatic cyst. Patent vessels and right renal cysts up to 5.7cm and some nonobstructing right renal calculi. Spleen 12.3cm.  Oct 14 2019 wbc 3.8 and hg 14.4 plat 177 and neutrophils 1.3 little low and glu 123 and cr 0.74 and na 141 and k 4.5 and cl 100 and co2 25 and alb 4.3 and tb 0.9 and alk 336 ast 67 and alt 61. hep b immune 40.5  Sept 18 2019 na 140 and k 4.7 and cl 103 and glu 121 and bun 12 and cr 0.79 alb 3,8 and tb 0,9 and ca 9.6 and ast 49 and alt 48 and alk 334 and a1c 5.7 and chol 228 and trg 52 and hdl 86 and ldl 132 nd psa 0.01 and wbc 4.2 and hg 144 and plat 183.  June 19 2019 and liver midly coarse and 1cm hepatic cyst and stable and gb not distended and no stones and bile ducts not dilated and spleen stable 12.3cm abd right kdiney 11cm and several right kidney cysts up to 6.2cm. Saw stone sin the right kidneuy. No hydronephrosios. Vessels patent. No change vs 2018.  Dec 2018 u.s with liver appearing fatty and patent vessels. Right kidney cyst 6.1x4.7x5.3cm stable abd simple. Liver cyst 1.1x0.8x1.1.cm. Similar to prior scan.  April 2019 labs wbc 4.3 hg 14.7 plat 183 and glu 134 and cr 0.85 and na 141 k 5.1 and tb 1.0 and alk 446 and ast 85 and alt 91.  Feb 2019 alk 418 and ast 79 and alt 81.  Dec 2018 alk 440 and tb 0.9 and db 0.48 and ast 76 and alt 92. Liver 76% and bone 20% and intestine 4%.  11/26/2018: wbc 4.6, hgb 14.7, plts 175,000, gluc 130, vet 0.89, na 142, k 4.0, alb 4.4, frances 2.9, t.bili 1.0, , AST 75, ASLT 80,INR 1.0, TSH 2.26  9/04/2018: wbc 4.6, hgb 14.4 plts 166,000, gluc 130, creat 0.95, na 143, k 4.5, alb 4.3, frances 3.1, t.bili 0.8, , AST56, ALT 62, INR 1.0, TSH 2.17,  6/05/2018: HBsAb 4.5, HBsAg neg, HBcAb neg, HAV pos  6/20/2018: liver echogenic suggesting mild fatty infiltration, simple cyst 1.1 x 0.7 x 1.3,, gallbladder unremarkable, mpv patent, cbd 4mm, right kidney simple cyst 5.6 x 4.8 x 5.2 cm, echoegenic focus 1.3cm consider kidney stone.  His cholesterol is checked by Dr. Sachin Dimas. He says cholesterol has been ok.   Reminded pt re bone density and needs to be following locally and he has not done this and reminded to get with local provider.  11-13-19: spine normal and t score 0.1 and z score 0.1/ femur neck -0.9 and -0.4 and femur total -1.0 and -0.9. He shared with primary md. he will check with primary md to redo as been 2 yrs.   Plan: 1. Stay  ursodol to 500mg 1.5am and 1 in the evening with food. 2. U.s at [facility] in [place] at [facility] in . 3. Pt will do labs in 6 and 12 weeks.  Duration of the visit was 35 min with 10 min of prep and 25 min by paul as he had internet issues  with greater than 50% of the time spent on coordination of care and in reviewing chart with the pt.

## 2023-07-23 ENCOUNTER — TELEPHONE ENCOUNTER (OUTPATIENT)
Dept: URBAN - METROPOLITAN AREA CLINIC 86 | Facility: CLINIC | Age: 83
End: 2023-07-23

## 2023-07-23 NOTE — HPI-TODAY'S VISIT:
Dear Roel Trejo, Thank you for sending in your bone density report. This was done on July 10 and was reviewed by Dr. Hopkins per the note. And mentioned that you had osteopenia with your spine score being -0.7 the left hip -1.3 and the left femoral neck being -1.6. There is a suture fracture risk for the hip is 0.7% and major osteoporotic fracture risk is 2.5% over the next 10 years. Dr Longoria

## 2023-08-28 ENCOUNTER — LAB OUTSIDE AN ENCOUNTER (OUTPATIENT)
Dept: URBAN - METROPOLITAN AREA TELEHEALTH 2 | Facility: TELEHEALTH | Age: 83
End: 2023-08-28

## 2023-09-01 ENCOUNTER — TELEPHONE ENCOUNTER (OUTPATIENT)
Dept: URBAN - METROPOLITAN AREA CLINIC 86 | Facility: CLINIC | Age: 83
End: 2023-09-01

## 2023-09-01 LAB
ALBUMIN: 4.1
ALKALINE PHOSPHATASE: 456
ALT (SGPT): 82
AST (SGOT): 85
BILIRUBIN, DIRECT: 1.1
BILIRUBIN, TOTAL: 1.8
PROTEIN, TOTAL: 6.9

## 2023-09-01 NOTE — HPI-TODAY'S VISIT:
Dear Roel Trejo, August 30 labs show albumin stable at 4.1, bilirubin down to 1.8 from 2.0. Direct bilirubin 1.1. Alk phos 456 slightly up from 432 in May. AST 85 down from 105.  ALT 82 down from 103 in May. Glad to see that that this is starting to look like it is coming down lets see what the trend is. Dr. Longoria

## 2023-09-08 ENCOUNTER — TELEPHONE ENCOUNTER (OUTPATIENT)
Dept: URBAN - METROPOLITAN AREA CLINIC 86 | Facility: CLINIC | Age: 83
End: 2023-09-08

## 2023-09-27 ENCOUNTER — TELEPHONE ENCOUNTER (OUTPATIENT)
Dept: URBAN - METROPOLITAN AREA CLINIC 86 | Facility: CLINIC | Age: 83
End: 2023-09-27

## 2023-10-09 ENCOUNTER — LAB OUTSIDE AN ENCOUNTER (OUTPATIENT)
Dept: URBAN - METROPOLITAN AREA TELEHEALTH 2 | Facility: TELEHEALTH | Age: 83
End: 2023-10-09

## 2023-10-10 ENCOUNTER — TELEPHONE ENCOUNTER (OUTPATIENT)
Dept: URBAN - METROPOLITAN AREA CLINIC 86 | Facility: CLINIC | Age: 83
End: 2023-10-10

## 2023-10-10 ENCOUNTER — LAB OUTSIDE AN ENCOUNTER (OUTPATIENT)
Dept: URBAN - METROPOLITAN AREA CLINIC 86 | Facility: CLINIC | Age: 83
End: 2023-10-10

## 2023-10-10 NOTE — HPI-TODAY'S VISIT:
Dear Roel Trejo, I saw the report there was forwarded to us from October 9, 2023. Also saw that i was listed as your ordering provider for this (I do not see that we have ordered this) and it states that you have no evidence of any abdominal aortic aneurysm. I would asked that you share this report with your primary providers and will include this information attached to this letter. Dr. Longoria

## 2023-10-13 ENCOUNTER — TELEPHONE ENCOUNTER (OUTPATIENT)
Dept: URBAN - METROPOLITAN AREA CLINIC 86 | Facility: CLINIC | Age: 83
End: 2023-10-13

## 2023-10-13 LAB
A/G RATIO: 1.2
ALBUMIN: 3.8
ALKALINE PHOSPHATASE: 442
ALT (SGPT): 81
AST (SGOT): 83
BASO (ABSOLUTE): 0
BASOS: 1
BILIRUBIN, TOTAL: 1.6
BUN/CREATININE RATIO: 19
BUN: 16
CALCIUM: 9.8
CARBON DIOXIDE, TOTAL: 26
CHLORIDE: 103
CREATININE: 0.85
EGFR: 87
EOS (ABSOLUTE): 0.2
EOS: 4
GLOBULIN, TOTAL: 3.3
GLUCOSE: 124
HEMATOCRIT: 41.7
HEMATOLOGY COMMENTS:: (no result)
HEMOGLOBIN: 13.8
IMMATURE CELLS: (no result)
IMMATURE GRANS (ABS): 0
IMMATURE GRANULOCYTES: 0
LYMPHS (ABSOLUTE): 1.8
LYMPHS: 48
MCH: 29.9
MCHC: 33.1
MCV: 90
MONOCYTES(ABSOLUTE): 0.6
MONOCYTES: 14
NEUTROPHILS (ABSOLUTE): 1.3
NEUTROPHILS: 33
NRBC: (no result)
PLATELETS: 132
POTASSIUM: 4.4
PROTEIN, TOTAL: 7.1
RBC: 4.62
RDW: 13.1
SODIUM: 140
WBC: 3.9

## 2023-10-13 NOTE — HPI-TODAY'S VISIT:
Dear Roel Trejo, Oct 10 labs show wbc 3.9 and hg 13.8 and platelets 132 little lower and prior normal 159.  Mcv normal 90. Neutrophils 1.3 little low and lymphs 1.8 normal. Glucose 124 elevated and prior 124 also. Were you fasting this day? Bun 16 and cr 0.85 and stable.Na 140 and k 4.4 and cl 103 and co2 26. Ca 9.8 and alb 3.8 and tb 1.6 little lower from 1.8.Alk 442 little lower from 496. Ast 83 and alt 81 and both down from prior 108 ast and alt 98. Good to see these be lower. Dr Longoria

## 2023-10-16 ENCOUNTER — LAB OUTSIDE AN ENCOUNTER (OUTPATIENT)
Dept: URBAN - METROPOLITAN AREA TELEHEALTH 2 | Facility: TELEHEALTH | Age: 83
End: 2023-10-16

## 2023-10-16 ENCOUNTER — OFFICE VISIT (OUTPATIENT)
Dept: URBAN - METROPOLITAN AREA TELEHEALTH 2 | Facility: TELEHEALTH | Age: 83
End: 2023-10-16
Payer: MEDICARE

## 2023-10-16 VITALS — HEIGHT: 65 IN | BODY MASS INDEX: 27.99 KG/M2 | WEIGHT: 168 LBS

## 2023-10-16 DIAGNOSIS — K74.3 PBC (PRIMARY BILIARY CIRRHOSIS): ICD-10-CM

## 2023-10-16 DIAGNOSIS — K76.0 FATTY LIVER: ICD-10-CM

## 2023-10-16 DIAGNOSIS — K74.02 HEPATIC FIBROSIS, ADVANCED FIBROSIS: ICD-10-CM

## 2023-10-16 DIAGNOSIS — K74.60 CIRRHOSIS OF LIVER WITHOUT ASCITES: ICD-10-CM

## 2023-10-16 PROCEDURE — 99214 OFFICE O/P EST MOD 30 MIN: CPT | Performed by: PHYSICIAN ASSISTANT

## 2023-10-16 RX ORDER — URSODIOL 500 MG/1
TAKE 1.5 IN AM AND 1 IN PM ALTERNATING 1 PO TWICE A DAY TABLET ORAL
Qty: 202.5 TABLETS | Refills: 1

## 2023-10-16 RX ORDER — GABAPENTIN 300 MG/1
1 CAPSULE CAPSULE ORAL TWICE A DAY
Status: ACTIVE | COMMUNITY

## 2023-10-16 RX ORDER — AMLODIPINE BESYLATE 5 MG/1
TAKE 1 TABLET (5 MG) BY ORAL ROUTE ONCE DAILY TABLET ORAL 1
Qty: 0 | Refills: 0 | Status: ACTIVE | COMMUNITY
Start: 1900-01-01

## 2023-10-16 RX ORDER — URSODIOL 500 MG/1
TAKE 1.5 IN AM AND 1 IN PM ALTERNATING 1 PO TWICE A DAY TABLET ORAL
Qty: 202.5 TABLETS | Refills: 1 | Status: ACTIVE | COMMUNITY

## 2023-10-16 RX ORDER — FAMOTIDINE 40 MG/1
1 TABLET AT BEDTIME TABLET, FILM COATED ORAL ONCE A DAY
Status: ACTIVE | COMMUNITY

## 2023-10-16 RX ORDER — MIRABEGRON 25 MG/1
1 TABLET TABLET, FILM COATED, EXTENDED RELEASE ORAL ONCE A DAY
Status: ACTIVE | COMMUNITY

## 2023-10-16 NOTE — HPI-TODAY'S VISIT:
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  10/16/23 telemed Khanh Trejo, Oct 10 labs show wbc 3.9 and hg 13.8 and platelets 132 little lower and prior normal 159. Mcv normal 90. Neutrophils 1.3 little low and lymphs 1.8 normal. Glucose 124 elevated and prior 124 also. Were you fasting this day? Bun 16 and cr 0.85 and stable.Na 140 and k 4.4 and cl 103 and co2 26. Ca 9.8 and alb 3.8 and tb 1.6 little lower from 1.8.Alk 442 little lower from 496. Ast 83 and alt 81 and both down from prior 108 ast and alt 98. Good to see these be lower. Dr Von Trejo, I saw the report there was forwarded to us from October 9, 2023. Also saw that i was listed as your ordering provider for this (I do not see that we have ordered this) and it states that you have no evidence of any abdominal aortic aneurysm. I would asked that you share this report with your primary providers and will include this information attached to this letter. Dr. Von Trejo, August 30 labs show albumin stable at 4.1, bilirubin down to 1.8 from 2.0. Direct bilirubin 1.1. Alk phos 456 slightly up from 432 in May. AST 85 down from 105. ALT 82 down from 103 in May. Glad to see that that this is starting to look like it is coming down lets see what the trend is. Dr. Von Trejo, Thank you for sending in your bone density report. This was done on July 10 and was reviewed by Dr. Hopkins per the note. And mentioned that you had osteopenia with your spine score being -0.7 the left hip -1.3 and the left femoral neck being -1.6. There is a suture fracture risk for the hip is 0.7% and major osteoporotic fracture risk is 2.5% over the next 10 years.  Discused the labs and 10/10 labs better.  discussed still need the US given they did   recap July 10 ultrasound shows liver coarsened in its echotexture with blunting of the liver edges and mildly nodular contour.  No lesions seen. No dilated bile ducts seen and common bile duct 4.6 mm. Small gallbladder polyp seen measuring 3 x 3 x 3 mm.  Wick sign negative. Of note, prior u.s in Jan 2023 did not show this and so we need to follow this over time. Typically the concern is if this is a gallbladder polyp and not a stone is if it grows to 10 mm or more in size. Pancreas not seen due to overlying structures. Right kidney 10.4 cm with no hydronephrosis but with a simple right renal cyst measuring 6.4 x 6.7 x 3.9 cm that was previously 6.5 x 4.0 x 5.5 cm.  Punctate echogenic focus seen in the mid right kidney measuring 0.5 x 0.7 x 0.4 cm favored to be a stone. Left kidney 10.6 cm with no hydronephrosis. Spleen 13.2 cm. Liver vessels were patent which was good to see. In summary, they see chronic liver disease but without any sonographically evident lesions and patent vessels. They feel that this is a small gallbladder echogenic focus measuring 3 mm that they think is a pedunculated polyp versus less likely a stone. We will need to monitor this and look for any changes over time.  July 6 labs show lipase normal at 41, INR normal at 1.0, glucose was elevated at 124 down from 135. BUN of 15 creatinine normal 0.85 sodium 138 potassium 4.4 calcium 9.8 albumin 3.9. Bilirubin slightly higher at 1.8 from 1.7.  Alkaline phosphatase 496 up slightly from 491.   from 98 and ALT 98 from 94. WBC 3.9, hemoglobin 13.7 platelet count 159 MCV 92.  Neutrophils 1.6 and lymphocytes 1.5.  Sadly for these labs our only option would be to consider trying the Ocaliva again which had side effects for you versus continuing to wait for new or medicines for PBC to come out which may be next year or the following.  There is a medicine from [place] that they are trying to get approved.  That would be the next 1 to come out.  June 8 labs show sugar elevated at 135 and previously 119.  Have you been fasting when you do these labs?  There clearly are remaining elevated. BUN of 13 creatinine 0.756 sodium 141 potassium 4.6 chloride 106 CO2 of 25 albumin 3.9. Bilirubin slightly higher at 1.7 from 1.6.  Alk phos slightly higher at 491 from 452.  AST up to 98 from 87 and ALT up to 94 from 88.  Unclear to me if the sugars could also be making the liver labs be trending higher?  Have you done a hemoglobin A1c with primary provider to see what that shows? WBC 3.9 hemoglobin 13.6 platelet count slightly low at 147 MCV 92 and neutrophils 1.6 and lymphocytes 1.6. I think that looking at and clarifying the issue as to your need for getting the sugars to be optimal may be a reasonable issue to focus on for you and seeing what that does may help lower these. It is known that  sugars if not controlled can impact many other liver diseases.  May 10 labs show albumin 4.1, bilirubin elevated 2.0 and was 1.37 on the direct.  Alk phos was 452 slightly lower.  AST was slightly higher at 105 from 93 and ALT was 103 up from 98. Called pt to see if he is on a new meds. He denies any herbs or teas. No illness. No antibiotics. No nsaids. Not eating any excess of any vegetable supplements. Wt  was 77.5 kg and so now 1165 is max 1000 alternate 1250mg a day to get to dose that fits weight now.  Sweta was able to obtain your ultrasound and gave it to me, which was dated January 9, 2023, and it mentions that your liver had increased geographic echogenicity with macro and micronodular contour but no definite lesions. Liver showed no dilated bile ducts and common bile duct 3.4 mm. Gallbladder normal. Pancreas normal. Right kidney measured 9.5 cm with normal echogenicity and no hydronephrosis and simple right renal cyst measuring 6.5 x 4 cm and was previously 6.4 x 6 cm.  This was better evaluated on the more recent MRI.  Nonshadowing echogenic focus seen in the mid renal pole measuring 1.4 x 1.1 cm and felt likely corresponding to renal sinus fat. Left kidney measure 11.4 cm with normal echogenicity. Spleen was normal at 12 cm. Liver vessels were patent. Given this you need to repeat the scan in 6 months which would be then in the July timeframe. We will put in the order for this to be done in July for you.   April 7 labs show sugar still elevated at 119 but down from 131 December.  Please share with primary provider.  BUN is 17 creatinine 0.77 which is actually better than in December when it was 0.89.  Both however are normal range. Sodium 141 potassium 4.8 and chloride 101 which is normal. Your calcium was up at 10.5.  Are you taking a calcium and vitamin D supplement?  If you are that sometimes can cause this.  Please share with primary provider as this was normal before at 10.2 and prior to that 9.7. Asked pt and he is on vit d and regular vitamin. Asked him to check with primary re that as his calcium was high. Albumin normal at 4.1. Bilirubin slightly higher this time at 1.6 and previously 1.1.  Alkaline phosphatase was higher at 452 from 423 and AST was higher at 87 and ALT of 88 from prior levels. Asked pt if he was ill and says has been a bit stressed as son ill.  March 7 labs show albumin stable at 4.5 and actually rising which is good to see.  Bilirubin down to 1.1 from 1.3 with a direct 0.68.  Alk phos slightly higher at 470 from 427 AST and ALT slightly higher at 93 and 98 from previous AST of 84 and ALT of 82. Not really see in the labs drop yet and still slightly going up.  Again just confirming no new meds or herbal remedies?  Please let us know.  February 6 labs show albumin 4.4 which is normal and in fact a little higher for you.  Total bilirubin still 1.3 as before on January 9.  Direct bilirubin 0.68.  Alk phos went up a little to 427 from 400 and AST 84 and ALT 82 with previous AST 63 and ALT 69.  Egd from June 9 from Dr Vann. Told not due yet. Gastric body and gastric antrum with congestion, erosions, and erythema. Duodenum was normal. Small hiatal hernia present. LA grade B esophagitis seen in esophagus. 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 They mentioned to redo in 3-5 yrs? They ordered pantoprazole 40mg po qd.  December 21 2022 labs show INR normal at 1.0 which is good to see.  Is actually lower than back in September when it was 1.1. Sugar was elevated at 131 and as we mentioned before that may be related to you doing the labs not fasting.  Please share with primary providers. BUN of 17 creatinine 0.89 sodium 141 potassium 4.8 calcium 10.2 albumin 4.4 bilirubin 1.1 alkaline phosphatase went up a little to 423 from 323.  Any recent issues?  AST was 72 ALT of 70 up from 57 and 49. White blood cell count 4.1 hemoglobin 14.6 platelet count 151 MCV 98 neutrophils 2.2 lymphocytes 1.3. Called pt: took a medicine for his knees: meloxicam he took and has 8% risk to raise the labs. So that is what did that.  He did stop that.   September 19 labs show white blood cell count 3.6 hemoglobin 14.9 platelet count 145 down from 164.  Normal is from 150 up to 450.  1 year ago and September 3, 2021 the platelet count was about the same at 144. Neutrophils 1.6 lymphocytes 1.4 both normal.  Sugar was slightly up at 148 from previous 137.  BUN of 12 Cr 0.68.  Sodium 143 potassium 4.2 albumin 4.2 bilirubin normal at 0.6 and down from 0.9. Alkaline phosphatase down from 404 to 323.  AST slightly lower at 57 from 58.  ALT down to 49 from 58.  So they are moving in the right direction. INR normal at 1.1. Meld 7 and meld na 7 so remains low.  June 20 labs showed sugar elevated at 138.  BUN of 14 creatinine 0.79 sodium 140 potassium 4.6 chloride 102 CO2 27. Albumin 4.0 bilirubin 0.8 down from 1.1.  Urine bilirubin 0.38 down from 0.43.  Alkaline phosphatase 319 from 298 so it went up slightly.  AST went down to 45 and ALT to 46 from previous AST 60 and ALT 60. Overall the liver labs and the AST and ALT are lower and the alk phos slightly higher.  July 9 MRI sent in to me. Lower thorax shows minimal bibasilar atelectasis. Liver showed no significant fat or iron but does have chronic liver disease changes seen including lobar redistribution and nodular contour.  There are some reticular enhancement changes that are compatible with fibrosis.  No suspicious liver lesions seen.   Liver vessels are patent as expected. Small varices seen near the stomach and spleen.  Important to stay on top of egd surveillance for these issues. Recannulized periumbilical vein noted which is another sign of portal hypertension Spleen slightly enlarged at 13 cm. Pancreas and adrenal glands normal. Kidneys show some renal cysts and no further description was given. They did see your appendix and it appeared normal to them. They also saw some mild atherosclerotic disease of the abdominal aorta but without aneurysm.  Please share with primary providers to be aware of same. Degenerative changes seen of your spine. We may be able to look towards doing an ultrasound alternating with an MRI and we will discuss this further at your next visit.   March 15 labs show glucose elevated at 137 previously 143 and please share with primary provider.  BUN of 14 creatinine 0.86 sodium 139 potassium 4.8 calcium 10.0 albumin 4.2 bilirubin 0.9.  These other labs are normal range. Alkaline phosphatase did go up to 404 from previous 394 and prior 327.  AST came down from 63 to 58.  ALT came down from 68 to 58. Hill cell count 4.1 hemoglobin 14.9 platelet count 164 normal.  MCV 89. Neutrophils 1.4 and lymphocytes 1.9.  Ocaliva was on it had toxicity risk and not much lab benefit and being cirrhotic is higher risk and he is  getting older.  January 29 2022 MRI Lower thorax showed bibasilar atelectasis which means that the bases of the lungs were not fully expanded.  So metimes we can see that when you are in the MRI machine in that fixed position for a while.  Please share with primary provider. Liver showed no fat or iron but did have chronic liver disease changes including a nodular contour and lobar redistribution.  Some fibrosis changes seen.  No suspicious lesions.  Small left lobe hepatic cyst seen. Gallbladder normal with some unchanged mild intrahepatic bile duct dilation most pronounced in the periphery. Spleen was top normal in size at 13 cm. Pancreas, adrenal glands, and lymph nodes were normal. Stable renal cyst seen bilaterally. Mild atherosclerosis of the aorta seen without aneurysm.  Small varices near the esophagus and stomach so would need to stay on top of that EGD surveillance. Mild degenerative changes seen of the spine.  He did the covid 19 series and March 5 2021. He did the covid booster.  Meds off ocaliva for summer 2020.   November 30 labs show INR normal at 1.0 which is good to see.  Glucose currently 154 elevated and previously was 143 and prior to that 129.  All 3 were elevated.  We have discussed this previously.  Please share with primary providers per their review. BUN of 13 creatinine 0.84 sodium 142 potassium 4.8 chloride 102 CO2 of 27 calcium elevated at 10.3. Asked if he is on any calcium supplements?  he said not taking any that he knows. Previously was normal at 10.2 and prior 10.1.  Please share with primary provider also.  Albumin 4.5 bilirubin 1.0 which is normal.  Alkaline phosphatase elevated at 394 previously 327 and prior 309.  AST 63 and ALT 68 which appeared to be slightly higher from prior AST of 47 ALT 45. Asked if he had any new meds and he says he is a vit d supplement. White blood cell count 4 hemoglobin 14.8 platelet count slightly low at 146 but improved from last time at 144.  MCV normal at 90.  Neutrophils 1.5 and lymphocytes 1.7. Meld low at 6 and meld na 6.    September 3 labs show INR remains perfectly normal at 1.0 and was previously 1.1. Glucose still remains elevated at 143 previously 129 and share with primary provider.  BUN of 13 creatinine 0.84 sodium 140 potassium 4.5 calcium 10.2 albumin 4.1 bilirubin 1.0.  Previously bilirubin 0.7 but remains normal again at 1.0.  Alkaline phosphatase slightly up at 327 from 309 previously 355.  AST down to 47 from 55 from prior 63.  ALT 45 down from 51 and prior 59 so those continue to drop.  White blood cell count 4.3 hemoglobin 14.9 platelet count 144 which is slightly lower from 162.  MCV 90.  Neutrophils normal at 1.4. Meld remains low at 6 and meld na 6.  July 10 MRI shows the lower thorax to have bibasilar atelectasis. Liver shows morphologic changes of chronic liver disease with nodular contour and lobar redistribution.  They do see changes in the liver parenchyma that are suggestive of fibrosis.  More confluent fibrosis seen in the right lobe. No definite suspicious liver lesions seen.  You do have some perfusion anomalies which need to be rechecked in 6 months.  Scattered hepatic cysts are seen. Gallbladder was unremarkable and mild intrahepatic bile duct dilation was seen also more conspicuous in the right lobe versus the left.  This is felt to be compatible with your PBC diagnosis.  No extrahepatic bile duct dilation seen. Spleen top normal at 12.9 cm with small splenule in the left upper quadrant. Pancreas normal. Renal cysts were seen. Colon diverticulosis was noted as well. No inflammation however was seen in the colon. Mild paraesophageal perigastric and perisplenic varices were seen and moderate aortobiiliac atherosclerosis seen. Overall they felt you had signs of chronic liver disease but no evidence of any malignancy.   June 9 laboratories show white blood cell count 4.3 hemoglobin 13.8 platelet 162.  These are stable for you.  Platelet count is actually better than it was before at 151.  Neutrophils are stable at 1.6 and previously were 1.5.  Total bilirubin is down to 0.7 from 0.8.  Alkaline phosphatase is lower at 309 from 355.  AST down to 55 from 63 ALT 51 down from 59 and prior to that 68.  So the liver labs continue to be dropping.  Sodium 139 potassium 4.6 chloride 102 CO2 26 BUN 17 creatinine 0.93 glucose 129.  Sugar is actually lower than the previous time at 146. INR 1.1.   Meld score low at 7 and meld na 7.  April 27: alb 4.3 and tb 1.1 and db 0.43 and alk 298 and ast 60 and alt 60.  March 2: alk 355 and ast 63 and alt 59 so in fact alk phos lower now to 298 and ast and alt about the same.  8 weeks off labs March 2 and inr 1.0 and tb 0.8 and alk 355 and down from 376 and prior 392. ast 63 and alt 59 and down from 65 and 68 so little lower.  ca 10.0. na 140 and k 5.0 and glu 146 elevated. bun 18 and cr 0.9. wbc 3.9 and hg 14.9 and plat 151 and mcv 90.   4 weeks off labs: jan 25 2021 and inr normal 1.0 and so little lower now. tb 0.8 and lower from 0.9 and alk 376 from 392 so lower and ast 65 and alt 68 and prior ast 60 and alt 68 so not much of a change seen. na 143 and k 4.6 and cl 103 and co2 23 and cr 0.93. glu 128 elevated and mentioned to him. defer to local md re your sugars. cr 0.93. wbc 4.1 hg 14.5 and plat 138 (down from 165) and mcv 89.  Jan 23 mri: Morphologic changes of chronic liver disease without  hepatocellular carcinoma seen so that is good to note. Patent portal venous system with stigmata portal hypertension, including upper abdominal varices and splenomegaly noted so need to stay on top of egd surveillance. Right renal cyst with area of complexity on prior ultrasound demonstrates no septation, enhancement, or nodularity on this examination, compatible  with a simple cyst ( 4.7 x 6.2 x 7.8 cm.) This may have represented artifact on prior ultrasound. Continued attention on follow-up for liver disease was recommended. Liver showed no fat or iron. Scattered simple cysts in the left hepatic  lobe. Periesophageal, perigastric, perisplenic varices seen. Mild intrahepatic duct dilatation peripherally extending to the capsule, greater in the right hepatic lobe, compatible with primary biliary cirrhosis. No extrahepatic biliary ductal dilatation. Normal gallbladder. Spleen was enlarged measuring 13.1 cm in the craniocaudal dimension.Adjacent splenule. Pancreas normal. Mild atherosclerotic disease abdominal aorta without aneurysm. Mild degenerative changes of the spine.  Document Type: MRI Abdomen w/ + w/o Contrast Document Date: January 23, 2021 09:35 Document Status: Auth (Verified) Document Title: MRI Abdomen w/ + w/o Contrast Performed By: Jason Kapadia Verified By: Jason Kapadia on January 25, 2021 07:35 Encounter info: [id number], [facility], Single Visit OP, 1/23/2021 - 1/23/2021 * Final Report * Reason For Exam PBC//HEPATIC FIBROSIS//DIABETES//FATTY LIVER//RENAL CYST//ELEVATED ALKALINE PHOSPHATASE LEVEL REPORT EXAM: MRI Abdomen w/ + w/o Contrast CLINICAL INDICATION: PBC//HEPATIC FIBROSIS//DIABETES//FATTY LIVER//RENAL CYST//ELEVATED ALKALINE PHOSPHATASE LEVEL. TECHNIQUE: Multisequence, multiplanar MRI of the abdomen was performed without and with intravenous contrast. [id number] CONTRAST: 16 cc of Prohance COMPARISON: Outside MRI dated 9/1/2020. Abdominal ultrasound dated 8/10/2020. FINDINGS: Lower Thorax: Normal. Liver: No fat or iron. Morphologic changes of chronic liver disease, including lobar redistribution and nodular contour. Delayed reticular enhancement of the hepatic parenchyma, compatible with fibrosis. More confluent fibrosis in the right hepatic lobe. Scattered simple cysts in the left hepatic lobe. No hepatocellular carcinoma. Hepatic vasculature is patent. Recannulized paraumbilical vein. Periesophageal, perigastric, perisplenic varices. Gallbladder/Biliary Tree: Mild intrahepatic duct dilatation peripherally extending to the capsule, greater in the right hepatic lobe, compatible primary biliary cirrhosis. No extrahepatic biliary ductal dilatation. Normal gallbladder. Spleen: Enlarged measuring 13.1 cm in the craniocaudal dimension. Adjacent splenule. Pancreas: Normal. Adrenal Glands: Normal. Kidneys/Ureters: Renal cysts. Right renal cyst with area of complexity on prior ultrasound demonstrates no septation, enhancement, or nodularity on this examination and measures 4.7 x 6.2 x 7.8 cm. Gastrointestinal: No bowel obstruction. Lymph Nodes: Normal. Vessels: Mild atherosclerotic disease abdominal aorta without aneurysm. Peritoneum/Retroperitoneum: Normal. Bones/Soft Tissues: Mild degenerative changes of the spine. IMPRESSION: 1. Morphologic changes of chronic liver disease without hepatocellular carcinoma. 2. Patent portal venous system with stigmata portal hypertension, including upper abdominal varices and splenomegaly. 3. Right renal cyst with area of complexity on prior ultrasound demonstrates no septation, enhancement, or nodularity on this examination, compatible with a simple cyst. This may have represented artifact on prior ultrasound. Continued attention on follow-up for liver disease. Signature Line *** Final ***  Electronically Signed By: Jason Kapadia on 01/25/2021 07:35 Dictated by: Jason Kapadia  2 weeks off ocaliva: Sanju 15: glu 117 elevated some but similar to nov 117 but down from 204 in dec 30. bun 16 and cr 0.91. na 141 and k 4.7 and cl 102 and ca 10.1 and alb 4.2 and tb 0.9 down from 1.1 in nov. ast 60 and alt 68 and so about the same as you can see vs other two labs and alk 392  about the same vs dec but little up from nov 354. wbc 4.1 hg 14.8 plat 165. inr 1.1 stable. So no dramatic changes seen yet that point.  Dec 30 2020 labs in middle: wbc 3.9 hg 15.1 plat 167. mcv 97. tb 0.9 down  from 1.1 and alk 399 slightly higher from 354. ast 64 and alt 66 and prior ast 63 and alt 60. na 139 and k 4.7 and cl 99 and co2 27. glu 204 elevated so that is newer issue as prior 117 and 134 so show to local md. bun 15 and cr 1.07 little up and prior 0.85 so little dry and could be linked to the sugars.  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  He is on urosodiol 500mg BID and had been on for years Ocaliva 10mg q other day due to pruritis and then off due to pruritis.  He weighs 183. Max dose 1100 to 1250 range 13-15mg/kg and we can ursodiol to 1.5 in the am and 1 in the evening. that may help and will avoid something more risky.  Itching much better on the gabapentin and off the ocaliva.  Nov 25 2020: wbc 4.2 hg 15 and plat 150 and mcv 90. Neutrophils 1.3 and prior 1.4 and lymphs 2.0 and prior 1.9. tb 1.1 and alk 354 and ast 63 and alt  60 and prior tb 0.9 and alk 343 and ast 55 and alt 61. So about the same. glu 117 and down from 134. bun 15 and cr 0.85 and na 139 and k 4.4 and cl 101 and co2 25. alb 4.2 normal.  Saw local mri: 9-1 20: nonenhancing renal cyst and no definite renal mass. Largest cyst right kidney 5.7cm. Not surprisingly they thought liver appeared to be cirrhotic. Also apparent nonenhancing cysts in the liver up to 10.6mm. Nonspecific biliary dilation in liver. Extrahepatic duct appears decompressed. Nonspecific splenomegaly seen and no significant varices seen. Left adrenal suspected adenoma. Appendix seemed somewhat prominent to then at 6.4mm but was probably similar to prior per them. No definite gallstones. Prior [place] imaging liver coarsened. They recommended mri to better see possible complex renal cyst.  Saw urologist re the kidney issue was stable. They were to see him again in feb 2021 and had been changed.  The patient relates no significant family or personal history of liver disease. He states no history of new medications or alcohol use. The  patient reports a personal history of no other habits that could cause liver damage.  July 2020: tsh very low and show local md. T4 normal 8.5. inr 1.1 and tb 0.9 and alk 343 and ast 55 and alt 61 and tb 0.9 and alb 4.2 and ca 10.6 elevated and show local md also. na 144 and k 4.8. glu 134 elevated and maybe not fasting. cr 0.83. wbc 4.1 hg 15.3 plat 170.  Prior April ast 67 and alt 66 and alk 362 and tb 0.9 so liver labs little lower this last time despite the low dose and alk little lower also. calcium prior 10.1 and is not on any calcium supplements.  He says local doctor following thyroid and says he is doing better.  4- wbc 4.0 and hg 14.3 and plat 155 and neutrophils 1.2 little low. glu 130 and cr 0.78 and na 140 and k 4.4 and cl 101 and co2 22 and alb 4.3 and tb 0.9 and alk 362 and ast 67 and alt 66 and inr 1.1.  2-17-20 and wbc 4.1 hg 14.5 plat 153 and neutrophils 1.3 and glu 143 and cr 0.96 and na 140 and k 4.3 and cl 100 and co2 25 and alb 4.0 and tb 0.8 and alk 352 and ast 66 and alt 61 and inr 1.0.  12-16-19 and wbc 3.6 and hg 14.3 and plat 184 and neutrophils 1.3 and glu 124 and cr 0.86 and na 142 and k 5.0 and cl 102 and cl2 25 and alb 4.3 and tb 0.9 and alk 353 and ast 70 and alt 67 and inr 1.0.  Document Type: US Abdomen Doppler Complete Document Date: August 10, 2020 10:04 Document Status: Auth (Verified) Document Title: US Abdomen Doppler Complete Performed By: Jack Martin Verified By: Candelaria Rodriguez on August 10, 2020 11:41 Encounter info: [id number], [facility], Single Visit OP, 8/10/2020 - * Final Report * Reason For Exam primary billary cholangitis REPORT EXAM: US Abdomen Doppler Complete, US Abdomen Complete CLINICAL INDICATION: Primary billary cholangitis. TECHNIQUE: Grayscale, pulsed wave and color Doppler sonography of the upper abdomen were performed. [id number] COMPARISON: None FINDINGS: Liver: Coarsened echotexture. No lesions. Bile Ducts: No dilated intrahepatic biliary radicles. Common duct measures 4 mm. Gallbladder: Normal. Wick's sign is negative. Pancreas: Partially obscured by overlying structures. Right Kidney: Measures 11.4 cm. Normal echogenicity. No hydronephrosis. 6.4 x 5.9 x 5.3 cm inferior pole cystic lesion with internal thickened septation versus areas of nodularity, indeterminate. Hyperechoic nonshadowing cortical focus measuring 0.5 x 0.4 x 0.2 cm.. Nonshadowing hyperechoic focus near the corticomedullary junction measuring 1.1 x 0.8 x 0.6 cm. Left Kidney: Measures 10.6 cm. Normal echogenicity. No hydronephrosis. Spleen: Measures 12.2 cm. Aorta: Normal caliber where imaged. IVC: Normal proximally, not imaged distally. Hepatic Veins: Normal. Portal Veins: Hepatopetal flow. Velocity of 27 cm/s in the main portal vein, 25 cm/s in the right portal vein, and 20 cm/s in the left portal vein. Hepatic Arteries: Peak systolic velocity 115 cm/s. Resistive index 0.77. Other: None. IMPRESSION: 1. Complex cystic lesion within the right kidney with thickened septation/areas of nodularity. Recommend MRI for further evaluation to exclude enhancing/solid components. Nonshadowing echogenic foci in the right kidney may represent nonobstructing stones although underlying lesions cannot be excluded. This can be evaluated at the time of MRI. 2. Coarsened hepatic echotexture can be seen with hepatic steatosis or chronic liver disease. No intra or extrahepatic biliary ductal dilation. Patent hepatic vasculature. The images were reviewed and interpreted by Candelaria Rodriguez MD. Signature Line *** Final *** Electronically Signed By: Candelaria Rodriguez on 08/10/2020 11:41 Dictated by: Jack Martin  12- u/s coarse hepatic ehcotexure and consistent with cirrhosis and 1.2cm hepatic cyst. Patent vessels and right renal cysts up to 5.7cm and some nonobstructing right renal calculi. Spleen 12.3cm.  Oct 14 2019 wbc 3.8 and hg 14.4 plat 177 and neutrophils 1.3 little low and glu 123 and cr 0.74 and na 141 and k 4.5 and cl 100 and co2 25 and alb 4.3 and tb 0.9 and alk 336 ast 67 and alt 61. hep b immune 40.5  Sept 18 2019 na 140 and k 4.7 and cl 103 and glu 121 and bun 12 and cr 0.79 alb 3,8 and tb 0,9 and ca 9.6 and ast 49 and alt 48 and alk 334 and a1c 5.7 and chol 228 and trg 52 and hdl 86 and ldl 132 nd psa 0.01 and wbc 4.2 and hg 144 and plat 183.  June 19 2019 and liver midly coarse and 1cm hepatic cyst and stable and gb not distended and no stones and bile ducts not dilated and spleen stable 12.3cm abd right kdiney 11cm and several right kidney cysts up to 6.2cm. Saw stone sin the right kidneuy. No hydronephrosios. Vessels patent. No change vs 2018.  Dec 2018 u.s with liver appearing fatty and patent vessels. Right kidney cyst 6.1x4.7x5.3cm stable abd simple. Liver cyst 1.1x0.8x1.1.cm. Similar to prior scan.  April 2019 labs wbc 4.3 hg 14.7 plat 183 and glu 134 and cr 0.85 and na 141 k 5.1 and tb 1.0 and alk 446 and ast 85 and alt 91.  Feb 2019 alk 418 and ast 79 and alt 81.  Dec 2018 alk 440 and tb 0.9 and db 0.48 and ast 76 and alt 92. Liver 76% and bone 20% and intestine 4%.  11/26/2018: wbc 4.6, hgb 14.7, plts 175,000, gluc 130, vet 0.89, na 142, k 4.0, alb 4.4, frances 2.9, t.bili 1.0, , AST 75, ASLT 80,INR 1.0, TSH 2.26  9/04/2018: wbc 4.6, hgb 14.4 plts 166,000, gluc 130, creat 0.95, na 143, k 4.5, alb 4.3, frances 3.1, t.bili 0.8, , AST56, ALT 62, INR 1.0, TSH 2.17,  6/05/2018: HBsAb 4.5, HBsAg neg, HBcAb neg, HAV pos  6/20/2018: liver echogenic suggesting mild fatty infiltration, simple cyst 1.1 x 0.7 x 1.3,, gallbladder unremarkable, mpv patent, cbd 4mm, right kidney simple cyst 5.6 x 4.8 x 5.2 cm, echoegenic focus 1.3cm consider kidney stone.  His cholesterol is checked by Dr. Sachin Dimas. He says cholesterol has been ok.   Reminded pt re bone density and needs to be following locally and he has not done this and reminded to get with local provider.  11-13-19: spine normal and t score 0.1 and z score 0.1/ femur neck -0.9 and -0.4 and femur total -1.0 and -0.9. He shared with primary md. he will check with primary md to redo as been 2 yrs.

## 2023-12-11 ENCOUNTER — LAB OUTSIDE AN ENCOUNTER (OUTPATIENT)
Dept: URBAN - METROPOLITAN AREA TELEHEALTH 2 | Facility: TELEHEALTH | Age: 83
End: 2023-12-11

## 2023-12-14 ENCOUNTER — OFFICE VISIT (OUTPATIENT)
Dept: URBAN - METROPOLITAN AREA TELEHEALTH 2 | Facility: TELEHEALTH | Age: 83
End: 2023-12-14

## 2023-12-21 ENCOUNTER — OFFICE VISIT (OUTPATIENT)
Dept: URBAN - METROPOLITAN AREA CLINIC 86 | Facility: CLINIC | Age: 83
End: 2023-12-21

## 2023-12-22 ENCOUNTER — TELEPHONE ENCOUNTER (OUTPATIENT)
Dept: URBAN - METROPOLITAN AREA CLINIC 91 | Facility: CLINIC | Age: 83
End: 2023-12-22

## 2024-01-16 ENCOUNTER — LAB OUTSIDE AN ENCOUNTER (OUTPATIENT)
Dept: URBAN - METROPOLITAN AREA CLINIC 86 | Facility: CLINIC | Age: 84
End: 2024-01-16

## 2024-01-17 ENCOUNTER — TELEPHONE ENCOUNTER (OUTPATIENT)
Dept: URBAN - METROPOLITAN AREA CLINIC 86 | Facility: CLINIC | Age: 84
End: 2024-01-17

## 2024-01-17 LAB
A/G RATIO: 1.4
ALBUMIN: 4.2
ALKALINE PHOSPHATASE: 483
ALT (SGPT): 107
AST (SGOT): 96
BASO (ABSOLUTE): 0
BASOS: 1
BILIRUBIN, TOTAL: 2.2
BUN/CREATININE RATIO: 16
BUN: 13
CALCIUM: 10.3
CARBON DIOXIDE, TOTAL: 26
CHLORIDE: 102
CREATININE: 0.81
EGFR: 88
EOS (ABSOLUTE): 0.2
EOS: 4
GLOBULIN, TOTAL: 3
GLUCOSE: 136
HEMATOCRIT: 42.3
HEMATOLOGY COMMENTS:: (no result)
HEMOGLOBIN: 14.5
IMMATURE CELLS: (no result)
IMMATURE GRANS (ABS): 0
IMMATURE GRANULOCYTES: 0
LYMPHS (ABSOLUTE): 1.8
LYMPHS: 46
MCH: 30.8
MCHC: 34.3
MCV: 90
MONOCYTES(ABSOLUTE): 0.4
MONOCYTES: 11
NEUTROPHILS (ABSOLUTE): 1.5
NEUTROPHILS: 38
NRBC: (no result)
PLATELETS: 127
POTASSIUM: 4.7
PROTEIN, TOTAL: 7.2
RBC: 4.71
RDW: 12.8
SODIUM: 141
WBC: 3.9

## 2024-01-17 NOTE — HPI-TODAY'S VISIT:
Dear Roel Trejo,  January 16 labs show sugar slightly up at 136 and unclear if you were fasting or not?  BUN of 13 creatinine 0.81 sodium 141 potassium 4.7 chloride 102 and CO2 of 26 and these are normal. Your calcium was slightly up at 10.3 with normal being from 8.6 up to 10.2.  Have you been taking any calcium or vitamin D supplements lately?  Albumin was 4.2 normal. Bilirubin slightly higher at 2.2 from 1.6 and alk phos 483 from 442.  AST up slightly to 96 and ALT also up to 107. Any recent illness or other issues that may have caused the labs to fluctuate up versus your prior labs?  WBC 3.9 hemoglobin 14.5 and plate count slightly lower at 127 from 132 before.  MCV 90. Neutrophils were normal at 1.5 and lymphocytes 1.8 which would suggest less likely for you to have been ill recently. Any new medicines that you have started?  Any new vitamins?  Will review with you more at your visit. Dr. Longoria

## 2024-01-24 ENCOUNTER — LAB OUTSIDE AN ENCOUNTER (OUTPATIENT)
Dept: URBAN - METROPOLITAN AREA TELEHEALTH 2 | Facility: TELEHEALTH | Age: 84
End: 2024-01-24

## 2024-01-24 ENCOUNTER — OFFICE VISIT (OUTPATIENT)
Dept: URBAN - METROPOLITAN AREA TELEHEALTH 2 | Facility: TELEHEALTH | Age: 84
End: 2024-01-24
Payer: MEDICARE

## 2024-01-24 VITALS — WEIGHT: 170 LBS | HEIGHT: 65 IN | BODY MASS INDEX: 28.32 KG/M2

## 2024-01-24 DIAGNOSIS — L29.9 PRURITIC CONDITION: ICD-10-CM

## 2024-01-24 DIAGNOSIS — Z79.899 HIGH RISK MEDICATION USE: ICD-10-CM

## 2024-01-24 DIAGNOSIS — K74.02 HEPATIC FIBROSIS, ADVANCED FIBROSIS: ICD-10-CM

## 2024-01-24 DIAGNOSIS — K74.3 PBC (PRIMARY BILIARY CIRRHOSIS): ICD-10-CM

## 2024-01-24 PROCEDURE — 99215 OFFICE O/P EST HI 40 MIN: CPT

## 2024-01-24 RX ORDER — AMLODIPINE BESYLATE 5 MG/1
TAKE 1 TABLET (5 MG) BY ORAL ROUTE ONCE DAILY TABLET ORAL 1
Qty: 0 | Refills: 0 | Status: ACTIVE | COMMUNITY
Start: 1900-01-01

## 2024-01-24 RX ORDER — URSODIOL 500 MG/1
TAKE 1.5 IN AM AND 1 IN PM ALTERNATING 1 PO TWICE A DAY TABLET ORAL
Qty: 202.5 TABLETS | Refills: 1

## 2024-01-24 RX ORDER — MIRABEGRON 25 MG/1
1 TABLET TABLET, FILM COATED, EXTENDED RELEASE ORAL ONCE A DAY
Status: ACTIVE | COMMUNITY

## 2024-01-24 RX ORDER — URSODIOL 500 MG/1
TAKE 1.5 IN AM AND 1 IN PM ALTERNATING 1 PO TWICE A DAY TABLET ORAL
Qty: 202.5 TABLETS | Refills: 1 | Status: ACTIVE | COMMUNITY

## 2024-01-24 RX ORDER — GABAPENTIN 300 MG/1
1 CAPSULE CAPSULE ORAL TWICE A DAY
Status: ACTIVE | COMMUNITY

## 2024-01-24 RX ORDER — FAMOTIDINE 40 MG/1
1 TABLET AT BEDTIME TABLET, FILM COATED ORAL ONCE A DAY
Status: ACTIVE | COMMUNITY

## 2024-01-24 NOTE — HPI-TODAY'S VISIT:
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  January 16 labs show sugar slightly up at 136 and unclear if you were fasting or not? BUN of 13 creatinine 0.81 sodium 141 potassium 4.7 chloride 102 and CO2 of 26 and these are normal. Your calcium was slightly up at 10.3 with normal being from 8.6 up to 10.2. Have you been taking any calcium or vitamin D supplements lately? Not on vitamin supplements but eating little more. Albumin was 4.2 normal. Bilirubin slightly higher at 2.2 from 1.6 and alk phos 483 from 442. AST up slightly to 96 and ALT also up to 107. Any recent illness or other issues that may have caused the labs to fluctuate up versus your prior labs? WBC 3.9 hemoglobin 14.5 and platelet count slightly lower at 127 from 132 before. MCV 90. Neutrophils were normal at 1.5 and lymphocytes 1.8 which would suggest less likely for you to have been ill recently.  No recent illnes. Says exercise is less due to weather.  10/16/23 telemed  Oct 10 labs show wbc 3.9 and hg 13.8 and platelets 132 little lower and prior normal 159. Mcv normal 90. Neutrophils 1.3 little low and lymphs 1.8 normal. Glucose 124 elevated and prior 124 also. Were you fasting this day? Bun 16 and cr 0.85 and stable.Na 140 and k 4.4 and cl 103 and co2 26. Ca 9.8 and alb 3.8 and tb 1.6 little lower from 1.8.Alk 442 little lower from 496. Ast 83 and alt 81 and both down from prior 108 ast and alt 98.  October 9, 2023. Also saw that i was listed as your ordering provider for this (I do not see that we have ordered this) and it states that you have no evidence of any abdominal aortic aneurysm.  August 30 labs show albumin stable at 4.1, bilirubin down to 1.8 from 2.0. Direct bilirubin 1.1. Alk phos 456 slightly up from 432 in May. AST 85 down from 105. ALT 82 down from 103 in May. Glad to see that that this is starting to look like it is coming down lets see what the trend is.  July 10 2023 and was reviewed by Dr. Hopkins per the note. And mentioned that you had osteopenia with your spine score being -0.7 the left hip -1.3 and the left femoral neck being -1.6. There is a suture fracture risk for the hip is 0.7% and major osteoporotic fracture risk is 2.5% over the next 10 years.  July 10 2023 ultrasound shows liver coarsened in its echotexture with blunting of the liver edges and mildly nodular contour.  No lesions seen. No dilated bile ducts seen and common bile duct 4.6 mm. Small gallbladder polyp seen measuring 3 x 3 x 3 mm.  Wick sign negative. Of note, prior u.s in Jan 2023 did not show this and so we need to follow this over time. Typically the concern is if this is a gallbladder polyp and not a stone is if it grows to 10 mm or more in size. Pancreas not seen due to overlying structures. Right kidney 10.4 cm with no hydronephrosis but with a simple right renal cyst measuring 6.4 x 6.7 x 3.9 cm that was previously 6.5 x 4.0 x 5.5 cm.  Punctate echogenic focus seen in the mid right kidney measuring 0.5 x 0.7 x 0.4 cm favored to be a stone. Left kidney 10.6 cm with no hydronephrosis. Spleen 13.2 cm. Liver vessels were patent which was good to see. In summary, they see chronic liver disease but without any sonographically evident lesions and patent vessels. They feel that this is a small gallbladder echogenic focus measuring 3 mm that they think is a pedunculated polyp versus less likely a stone. We will need to monitor this and look for any changes over time.  July 6 labs show lipase normal at 41, INR normal at 1.0, glucose was elevated at 124 down from 135. BUN of 15 creatinine normal 0.85 sodium 138 potassium 4.4 calcium 9.8 albumin 3.9. Bilirubin slightly higher at 1.8 from 1.7.  Alkaline phosphatase 496 up slightly from 491.   from 98 and ALT 98 from 94. WBC 3.9, hemoglobin 13.7 platelet count 159 MCV 92.  Neutrophils 1.6 and lymphocytes 1.5.  Sadly for these labs our only option would be to consider trying the Ocaliva again which had side effects for you versus continuing to wait for new or medicines for PBC to come out which may be next year or the following.  There is a medicine from [place] that they are trying to get approved.  That would be the next 1 to come out.  June 8 labs show sugar elevated at 135 and previously 119.  Have you been fasting when you do these labs?  There clearly are remaining elevated. BUN of 13 creatinine 0.756 sodium 141 potassium 4.6 chloride 106 CO2 of 25 albumin 3.9. Bilirubin slightly higher at 1.7 from 1.6.  Alk phos slightly higher at 491 from 452.  AST up to 98 from 87 and ALT up to 94 from 88.  Unclear to me if the sugars could also be making the liver labs be trending higher?  Have you done a hemoglobin A1c with primary provider to see what that shows? WBC 3.9 hemoglobin 13.6 platelet count slightly low at 147 MCV 92 and neutrophils 1.6 and lymphocytes 1.6. I think that looking at and clarifying the issue as to your need for getting the sugars to be optimal may be a reasonable issue to focus on for you and seeing what that does may help lower these. It is known that  sugars if not controlled can impact many other liver diseases.  May 10 labs show albumin 4.1, bilirubin elevated 2.0 and was 1.37 on the direct.  Alk phos was 452 slightly lower.  AST was slightly higher at 105 from 93 and ALT was 103 up from 98. Called pt to see if he is on a new meds. He denies any herbs or teas. No illness. No antibiotics. No nsaids. Not eating any excess of any vegetable supplements. Wt  was 77.5 kg and so now 1165 is max 1000 alternate 1250mg a day to get to dose that fits weight now.  Sweta was able to obtain your ultrasound and gave it to me, which was dated January 9, 2023, and it mentions that your liver had increased geographic echogenicity with macro and micronodular contour but no definite lesions. Liver showed no dilated bile ducts and common bile duct 3.4 mm. Gallbladder normal. Pancreas normal. Right kidney measured 9.5 cm with normal echogenicity and no hydronephrosis and simple right renal cyst measuring 6.5 x 4 cm and was previously 6.4 x 6 cm.  This was better evaluated on the more recent MRI.  Nonshadowing echogenic focus seen in the mid renal pole measuring 1.4 x 1.1 cm and felt likely corresponding to renal sinus fat. Left kidney measure 11.4 cm with normal echogenicity. Spleen was normal at 12 cm. Liver vessels were patent. Given this you need to repeat the scan in 6 months which would be then in the July timeframe. We will put in the order for this to be done in July for you.   April 7 labs show sugar still elevated at 119 but down from 131 December.  Please share with primary provider.  BUN is 17 creatinine 0.77 which is actually better than in December when it was 0.89.  Both however are normal range. Sodium 141 potassium 4.8 and chloride 101 which is normal. Your calcium was up at 10.5.  Are you taking a calcium and vitamin D supplement?  If you are that sometimes can cause this.  Please share with primary provider as this was normal before at 10.2 and prior to that 9.7. Asked pt and he is on vit d and regular vitamin. Asked him to check with primary re that as his calcium was high. Albumin normal at 4.1. Bilirubin slightly higher this time at 1.6 and previously 1.1.  Alkaline phosphatase was higher at 452 from 423 and AST was higher at 87 and ALT of 88 from prior levels. Asked pt if he was ill and says has been a bit stressed as son ill.  March 7 labs show albumin stable at 4.5 and actually rising which is good to see.  Bilirubin down to 1.1 from 1.3 with a direct 0.68.  Alk phos slightly higher at 470 from 427 AST and ALT slightly higher at 93 and 98 from previous AST of 84 and ALT of 82. Not really see in the labs drop yet and still slightly going up.  Again just confirming no new meds or herbal remedies?  Please let us know.  February 6 labs show albumin 4.4 which is normal and in fact a little higher for you.  Total bilirubin still 1.3 as before on January 9.  Direct bilirubin 0.68.  Alk phos went up a little to 427 from 400 and AST 84 and ALT 82 with previous AST 63 and ALT 69.  Egd from June 9 from Dr Vann. Told not due yet. Gastric body and gastric antrum with congestion, erosions, and erythema. Duodenum was normal. Small hiatal hernia present. LA grade B esophagitis seen in esophagus. 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 They mentioned to redo in 3-5 yrs? They ordered pantoprazole 40mg po qd.  December 21 2022 labs show INR normal at 1.0 which is good to see.  Is actually lower than back in September when it was 1.1. Sugar was elevated at 131 and as we mentioned before that may be related to you doing the labs not fasting.  Please share with primary providers. BUN of 17 creatinine 0.89 sodium 141 potassium 4.8 calcium 10.2 albumin 4.4 bilirubin 1.1 alkaline phosphatase went up a little to 423 from 323.  Any recent issues?  AST was 72 ALT of 70 up from 57 and 49. White blood cell count 4.1 hemoglobin 14.6 platelet count 151 MCV 98 neutrophils 2.2 lymphocytes 1.3. Called pt: took a medicine for his knees: meloxicam he took and has 8% risk to raise the labs. So that is what did that.  He did stop that.   September 19 labs show white blood cell count 3.6 hemoglobin 14.9 platelet count 145 down from 164.  Normal is from 150 up to 450.  1 year ago and September 3, 2021 the platelet count was about the same at 144. Neutrophils 1.6 lymphocytes 1.4 both normal.  Sugar was slightly up at 148 from previous 137.  BUN of 12 Cr 0.68.  Sodium 143 potassium 4.2 albumin 4.2 bilirubin normal at 0.6 and down from 0.9. Alkaline phosphatase down from 404 to 323.  AST slightly lower at 57 from 58.  ALT down to 49 from 58.  So they are moving in the right direction. INR normal at 1.1. Meld 7 and meld na 7 so remains low.  June 20 labs showed sugar elevated at 138.  BUN of 14 creatinine 0.79 sodium 140 potassium 4.6 chloride 102 CO2 27. Albumin 4.0 bilirubin 0.8 down from 1.1.  Urine bilirubin 0.38 down from 0.43.  Alkaline phosphatase 319 from 298 so it went up slightly.  AST went down to 45 and ALT to 46 from previous AST 60 and ALT 60. Overall the liver labs and the AST and ALT are lower and the alk phos slightly higher.  July 9 MRI sent in to me. Lower thorax shows minimal bibasilar atelectasis. Liver showed no significant fat or iron but does have chronic liver disease changes seen including lobar redistribution and nodular contour.  There are some reticular enhancement changes that are compatible with fibrosis.  No suspicious liver lesions seen.   Liver vessels are patent as expected. Small varices seen near the stomach and spleen.  Important to stay on top of egd surveillance for these issues. Recannulized periumbilical vein noted which is another sign of portal hypertension Spleen slightly enlarged at 13 cm. Pancreas and adrenal glands normal. Kidneys show some renal cysts and no further description was given. They did see your appendix and it appeared normal to them. They also saw some mild atherosclerotic disease of the abdominal aorta but without aneurysm.  Please share with primary providers to be aware of same. Degenerative changes seen of your spine. We may be able to look towards doing an ultrasound alternating with an MRI and we will discuss this further at your next visit.   March 15 labs show glucose elevated at 137 previously 143 and please share with primary provider.  BUN of 14 creatinine 0.86 sodium 139 potassium 4.8 calcium 10.0 albumin 4.2 bilirubin 0.9.  These other labs are normal range. Alkaline phosphatase did go up to 404 from previous 394 and prior 327.  AST came down from 63 to 58.  ALT came down from 68 to 58. Hill cell count 4.1 hemoglobin 14.9 platelet count 164 normal.  MCV 89. Neutrophils 1.4 and lymphocytes 1.9.  Ocaliva was on it had toxicity risk and not much lab benefit and being cirrhotic is higher risk and he is  getting older.  January 29 2022 MRI Lower thorax showed bibasilar atelectasis which means that the bases of the lungs were not fully expanded.  So metimes we can see that when you are in the MRI machine in that fixed position for a while.  Please share with primary provider. Liver showed no fat or iron but did have chronic liver disease changes including a nodular contour and lobar redistribution.  Some fibrosis changes seen.  No suspicious lesions.  Small left lobe hepatic cyst seen. Gallbladder normal with some unchanged mild intrahepatic bile duct dilation most pronounced in the periphery. Spleen was top normal in size at 13 cm. Pancreas, adrenal glands, and lymph nodes were normal. Stable renal cyst seen bilaterally. Mild atherosclerosis of the aorta seen without aneurysm.  Small varices near the esophagus and stomach so would need to stay on top of that EGD surveillance. Mild degenerative changes seen of the spine.  He did the covid 19 series and March 5 2021. He did the covid booster.  Meds off ocaliva for summer 2020.   November 30 labs show INR normal at 1.0 which is good to see.  Glucose currently 154 elevated and previously was 143 and prior to that 129.  All 3 were elevated.  We have discussed this previously.  Please share with primary providers per their review. BUN of 13 creatinine 0.84 sodium 142 potassium 4.8 chloride 102 CO2 of 27 calcium elevated at 10.3. Asked if he is on any calcium supplements?  he said not taking any that he knows. Previously was normal at 10.2 and prior 10.1.  Please share with primary provider also.  Albumin 4.5 bilirubin 1.0 which is normal.  Alkaline phosphatase elevated at 394 previously 327 and prior 309.  AST 63 and ALT 68 which appeared to be slightly higher from prior AST of 47 ALT 45. Asked if he had any new meds and he says he is a vit d supplement. White blood cell count 4 hemoglobin 14.8 platelet count slightly low at 146 but improved from last time at 144.  MCV normal at 90.  Neutrophils 1.5 and lymphocytes 1.7. Meld low at 6 and meld na 6.    September 3 labs show INR remains perfectly normal at 1.0 and was previously 1.1. Glucose still remains elevated at 143 previously 129 and share with primary provider.  BUN of 13 creatinine 0.84 sodium 140 potassium 4.5 calcium 10.2 albumin 4.1 bilirubin 1.0.  Previously bilirubin 0.7 but remains normal again at 1.0.  Alkaline phosphatase slightly up at 327 from 309 previously 355.  AST down to 47 from 55 from prior 63.  ALT 45 down from 51 and prior 59 so those continue to drop.  White blood cell count 4.3 hemoglobin 14.9 platelet count 144 which is slightly lower from 162.  MCV 90.  Neutrophils normal at 1.4. Meld remains low at 6 and meld na 6.  July 10 MRI shows the lower thorax to have bibasilar atelectasis. Liver shows morphologic changes of chronic liver disease with nodular contour and lobar redistribution.  They do see changes in the liver parenchyma that are suggestive of fibrosis.  More confluent fibrosis seen in the right lobe. No definite suspicious liver lesions seen.  You do have some perfusion anomalies which need to be rechecked in 6 months.  Scattered hepatic cysts are seen. Gallbladder was unremarkable and mild intrahepatic bile duct dilation was seen also more conspicuous in the right lobe versus the left.  This is felt to be compatible with your PBC diagnosis.  No extrahepatic bile duct dilation seen. Spleen top normal at 12.9 cm with small splenule in the left upper quadrant. Pancreas normal. Renal cysts were seen. Colon diverticulosis was noted as well. No inflammation however was seen in the colon. Mild paraesophageal perigastric and perisplenic varices were seen and moderate aortobiiliac atherosclerosis seen. Overall they felt you had signs of chronic liver disease but no evidence of any malignancy.   June 9 laboratories show white blood cell count 4.3 hemoglobin 13.8 platelet 162.  These are stable for you.  Platelet count is actually better than it was before at 151.  Neutrophils are stable at 1.6 and previously were 1.5.  Total bilirubin is down to 0.7 from 0.8.  Alkaline phosphatase is lower at 309 from 355.  AST down to 55 from 63 ALT 51 down from 59 and prior to that 68.  So the liver labs continue to be dropping.  Sodium 139 potassium 4.6 chloride 102 CO2 26 BUN 17 creatinine 0.93 glucose 129.  Sugar is actually lower than the previous time at 146. INR 1.1.   Meld score low at 7 and meld na 7.  April 27: alb 4.3 and tb 1.1 and db 0.43 and alk 298 and ast 60 and alt 60.  March 2: alk 355 and ast 63 and alt 59 so in fact alk phos lower now to 298 and ast and alt about the same.  8 weeks off labs March 2 and inr 1.0 and tb 0.8 and alk 355 and down from 376 and prior 392. ast 63 and alt 59 and down from 65 and 68 so little lower.  ca 10.0. na 140 and k 5.0 and glu 146 elevated. bun 18 and cr 0.9. wbc 3.9 and hg 14.9 and plat 151 and mcv 90.   4 weeks off labs: jan 25 2021 and inr normal 1.0 and so little lower now. tb 0.8 and lower from 0.9 and alk 376 from 392 so lower and ast 65 and alt 68 and prior ast 60 and alt 68 so not much of a change seen. na 143 and k 4.6 and cl 103 and co2 23 and cr 0.93. glu 128 elevated and mentioned to him. defer to local md re your sugars. cr 0.93. wbc 4.1 hg 14.5 and plat 138 (down from 165) and mcv 89.  Jan 23 mri: Morphologic changes of chronic liver disease without  hepatocellular carcinoma seen so that is good to note. Patent portal venous system with stigmata portal hypertension, including upper abdominal varices and splenomegaly noted so need to stay on top of egd surveillance. Right renal cyst with area of complexity on prior ultrasound demonstrates no septation, enhancement, or nodularity on this examination, compatible  with a simple cyst ( 4.7 x 6.2 x 7.8 cm.) This may have represented artifact on prior ultrasound. Continued attention on follow-up for liver disease was recommended. Liver showed no fat or iron. Scattered simple cysts in the left hepatic  lobe. Periesophageal, perigastric, perisplenic varices seen. Mild intrahepatic duct dilatation peripherally extending to the capsule, greater in the right hepatic lobe, compatible with primary biliary cirrhosis. No extrahepatic biliary ductal dilatation. Normal gallbladder. Spleen was enlarged measuring 13.1 cm in the craniocaudal dimension.Adjacent splenule. Pancreas normal. Mild atherosclerotic disease abdominal aorta without aneurysm. Mild degenerative changes of the spine.  Document Type: MRI Abdomen w/ + w/o Contrast Document Date: January 23, 2021 09:35 Document Status: Auth (Verified) Document Title: MRI Abdomen w/ + w/o Contrast Performed By: Jason Kapadia Verified By: Jason Kapadia on January 25, 2021 07:35 Encounter info: [id number], [facility], Single Visit OP, 1/23/2021 - 1/23/2021 * Final Report * Reason For Exam PBC//HEPATIC FIBROSIS//DIABETES//FATTY LIVER//RENAL CYST//ELEVATED ALKALINE PHOSPHATASE LEVEL REPORT EXAM: MRI Abdomen w/ + w/o Contrast CLINICAL INDICATION: PBC//HEPATIC FIBROSIS//DIABETES//FATTY LIVER//RENAL CYST//ELEVATED ALKALINE PHOSPHATASE LEVEL. TECHNIQUE: Multisequence, multiplanar MRI of the abdomen was performed without and with intravenous contrast. [id number] CONTRAST: 16 cc of Prohance COMPARISON: Outside MRI dated 9/1/2020. Abdominal ultrasound dated 8/10/2020. FINDINGS: Lower Thorax: Normal. Liver: No fat or iron. Morphologic changes of chronic liver disease, including lobar redistribution and nodular contour. Delayed reticular enhancement of the hepatic parenchyma, compatible with fibrosis. More confluent fibrosis in the right hepatic lobe. Scattered simple cysts in the left hepatic lobe. No hepatocellular carcinoma. Hepatic vasculature is patent. Recannulized paraumbilical vein. Periesophageal, perigastric, perisplenic varices. Gallbladder/Biliary Tree: Mild intrahepatic duct dilatation peripherally extending to the capsule, greater in the right hepatic lobe, compatible primary biliary cirrhosis. No extrahepatic biliary ductal dilatation. Normal gallbladder. Spleen: Enlarged measuring 13.1 cm in the craniocaudal dimension. Adjacent splenule. Pancreas: Normal. Adrenal Glands: Normal. Kidneys/Ureters: Renal cysts. Right renal cyst with area of complexity on prior ultrasound demonstrates no septation, enhancement, or nodularity on this examination and measures 4.7 x 6.2 x 7.8 cm. Gastrointestinal: No bowel obstruction. Lymph Nodes: Normal. Vessels: Mild atherosclerotic disease abdominal aorta without aneurysm. Peritoneum/Retroperitoneum: Normal. Bones/Soft Tissues: Mild degenerative changes of the spine. IMPRESSION: 1. Morphologic changes of chronic liver disease without hepatocellular carcinoma. 2. Patent portal venous system with stigmata portal hypertension, including upper abdominal varices and splenomegaly. 3. Right renal cyst with area of complexity on prior ultrasound demonstrates no septation, enhancement, or nodularity on this examination, compatible with a simple cyst. This may have represented artifact on prior ultrasound. Continued attention on follow-up for liver disease. Signature Line *** Final ***  Electronically Signed By: Jason Kapadia on 01/25/2021 07:35 Dictated by: Jason Kapadia  2 weeks off ocaliva: Sanju 15: glu 117 elevated some but similar to nov 117 but down from 204 in dec 30. bun 16 and cr 0.91. na 141 and k 4.7 and cl 102 and ca 10.1 and alb 4.2 and tb 0.9 down from 1.1 in nov. ast 60 and alt 68 and so about the same as you can see vs other two labs and alk 392  about the same vs dec but little up from nov 354. wbc 4.1 hg 14.8 plat 165. inr 1.1 stable. So no dramatic changes seen yet that point.  Dec 30 2020 labs in middle: wbc 3.9 hg 15.1 plat 167. mcv 97. tb 0.9 down  from 1.1 and alk 399 slightly higher from 354. ast 64 and alt 66 and prior ast 63 and alt 60. na 139 and k 4.7 and cl 99 and co2 27. glu 204 elevated so that is newer issue as prior 117 and 134 so show to local md. bun 15 and cr 1.07 little up and prior 0.85 so little dry and could be linked to the sugars.  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  He is on urosodiol 500mg BID and had been on for years Ocaliva 10mg q other day due to pruritis and then off due to pruritis.  He weighs 183. Max dose 1100 to 1250 range 13-15mg/kg and we can ursodiol to 1.5 in the am and 1 in the evening. that may help and will avoid something more risky.  Itching much better on the gabapentin and off the ocaliva.  Nov 25 2020: wbc 4.2 hg 15 and plat 150 and mcv 90. Neutrophils 1.3 and prior 1.4 and lymphs 2.0 and prior 1.9. tb 1.1 and alk 354 and ast 63 and alt  60 and prior tb 0.9 and alk 343 and ast 55 and alt 61. So about the same. glu 117 and down from 134. bun 15 and cr 0.85 and na 139 and k 4.4 and cl 101 and co2 25. alb 4.2 normal.  Saw local mri: 9-1 20: nonenhancing renal cyst and no definite renal mass. Largest cyst right kidney 5.7cm. Not surprisingly they thought liver appeared to be cirrhotic. Also apparent nonenhancing cysts in the liver up to 10.6mm. Nonspecific biliary dilation in liver. Extrahepatic duct appears decompressed. Nonspecific splenomegaly seen and no significant varices seen. Left adrenal suspected adenoma. Appendix seemed somewhat prominent to then at 6.4mm but was probably similar to prior per them. No definite gallstones. Prior [facility] imaging liver coarsened. They recommended mri to better see possible complex renal cyst.  Saw urologist re the kidney issue was stable. They were to see him again in feb 2021 and had been changed.  The patient relates no significant family or personal history of liver disease. He states no history of new medications or alcohol use. The  patient reports a personal history of no other habits that could cause liver damage.  July 2020: tsh very low and show local md. T4 normal 8.5. inr 1.1 and tb 0.9 and alk 343 and ast 55 and alt 61 and tb 0.9 and alb 4.2 and ca 10.6 elevated and show local md also. na 144 and k 4.8. glu 134 elevated and maybe not fasting. cr 0.83. wbc 4.1 hg 15.3 plat 170.  Prior April ast 67 and alt 66 and alk 362 and tb 0.9 so liver labs little lower this last time despite the low dose and alk little lower also. calcium prior 10.1 and is not on any calcium supplements.  He says local doctor following thyroid and says he is doing better.  4- wbc 4.0 and hg 14.3 and plat 155 and neutrophils 1.2 little low. glu 130 and cr 0.78 and na 140 and k 4.4 and cl 101 and co2 22 and alb 4.3 and tb 0.9 and alk 362 and ast 67 and alt 66 and inr 1.1.  2-17-20 and wbc 4.1 hg 14.5 plat 153 and neutrophils 1.3 and glu 143 and cr 0.96 and na 140 and k 4.3 and cl 100 and co2 25 and alb 4.0 and tb 0.8 and alk 352 and ast 66 and alt 61 and inr 1.0.  12-16-19 and wbc 3.6 and hg 14.3 and plat 184 and neutrophils 1.3 and glu 124 and cr 0.86 and na 142 and k 5.0 and cl 102 and cl2 25 and alb 4.3 and tb 0.9 and alk 353 and ast 70 and alt 67 and inr 1.0.  Document Type: US Abdomen Doppler Complete Document Date: August 10, 2020 10:04 Document Status: Auth (Verified) Document Title: US Abdomen Doppler Complete Performed By: Jack Martin Verified By: Candelaria Rodriguez on August 10, 2020 11:41 Encounter info: [id number], [facility], Single Visit OP, 8/10/2020 - * Final Report * Reason For Exam primary billary cholangitis REPORT EXAM: US Abdomen Doppler Complete, US Abdomen Complete CLINICAL INDICATION: Primary billary cholangitis. TECHNIQUE: Grayscale, pulsed wave and color Doppler sonography of the upper abdomen were performed. [id number] COMPARISON: None FINDINGS: Liver: Coarsened echotexture. No lesions. Bile Ducts: No dilated intrahepatic biliary radicles. Common duct measures 4 mm. Gallbladder: Normal. Wick's sign is negative. Pancreas: Partially obscured by overlying structures. Right Kidney: Measures 11.4 cm. Normal echogenicity. No hydronephrosis. 6.4 x 5.9 x 5.3 cm inferior pole cystic lesion with internal thickened septation versus areas of nodularity, indeterminate. Hyperechoic nonshadowing cortical focus measuring 0.5 x 0.4 x 0.2 cm.. Nonshadowing hyperechoic focus near the corticomedullary junction measuring 1.1 x 0.8 x 0.6 cm. Left Kidney: Measures 10.6 cm. Normal echogenicity. No hydronephrosis. Spleen: Measures 12.2 cm. Aorta: Normal caliber where imaged. IVC: Normal proximally, not imaged distally. Hepatic Veins: Normal. Portal Veins: Hepatopetal flow. Velocity of 27 cm/s in the main portal vein, 25 cm/s in the right portal vein, and 20 cm/s in the left portal vein. Hepatic Arteries: Peak systolic velocity 115 cm/s. Resistive index 0.77. Other: None. IMPRESSION: 1. Complex cystic lesion within the right kidney with thickened septation/areas of nodularity. Recommend MRI for further evaluation to exclude enhancing/solid components. Nonshadowing echogenic foci in the right kidney may represent nonobstructing stones although underlying lesions cannot be excluded. This can be evaluated at the time of MRI. 2. Coarsened hepatic echotexture can be seen with hepatic steatosis or chronic liver disease. No intra or extrahepatic biliary ductal dilation. Patent hepatic vasculature. The images were reviewed and interpreted by Candelaria Rodriguez MD. Signature Line *** Final *** Electronically Signed By: Candelaria Rodriguez on 08/10/2020 11:41 Dictated by: Jack Martin  12- u/s coarse hepatic ehcotexure and consistent with cirrhosis and 1.2cm hepatic cyst. Patent vessels and right renal cysts up to 5.7cm and some nonobstructing right renal calculi. Spleen 12.3cm.  Oct 14 2019 wbc 3.8 and hg 14.4 plat 177 and neutrophils 1.3 little low and glu 123 and cr 0.74 and na 141 and k 4.5 and cl 100 and co2 25 and alb 4.3 and tb 0.9 and alk 336 ast 67 and alt 61. hep b immune 40.5  Sept 18 2019 na 140 and k 4.7 and cl 103 and glu 121 and bun 12 and cr 0.79 alb 3,8 and tb 0,9 and ca 9.6 and ast 49 and alt 48 and alk 334 and a1c 5.7 and chol 228 and trg 52 and hdl 86 and ldl 132 nd psa 0.01 and wbc 4.2 and hg 144 and plat 183.  June 19 2019 and liver midly coarse and 1cm hepatic cyst and stable and gb not distended and no stones and bile ducts not dilated and spleen stable 12.3cm abd right kdiney 11cm and several right kidney cysts up to 6.2cm. Saw stone sin the right kidneuy. No hydronephrosios. Vessels patent. No change vs 2018.  Dec 2018 u.s with liver appearing fatty and patent vessels. Right kidney cyst 6.1x4.7x5.3cm stable abd simple. Liver cyst 1.1x0.8x1.1.cm. Similar to prior scan.  April 2019 labs wbc 4.3 hg 14.7 plat 183 and glu 134 and cr 0.85 and na 141 k 5.1 and tb 1.0 and alk 446 and ast 85 and alt 91.  Feb 2019 alk 418 and ast 79 and alt 81.  Dec 2018 alk 440 and tb 0.9 and db 0.48 and ast 76 and alt 92. Liver 76% and bone 20% and intestine 4%.  11/26/2018: wbc 4.6, hgb 14.7, plts 175,000, gluc 130, vet 0.89, na 142, k 4.0, alb 4.4, frances 2.9, t.bili 1.0, , AST 75, ASLT 80,INR 1.0, TSH 2.26  9/04/2018: wbc 4.6, hgb 14.4 plts 166,000, gluc 130, creat 0.95, na 143, k 4.5, alb 4.3, frances 3.1, t.bili 0.8, , AST56, ALT 62, INR 1.0, TSH 2.17,  6/05/2018: HBsAb 4.5, HBsAg neg, HBcAb neg, HAV pos  6/20/2018: liver echogenic suggesting mild fatty infiltration, simple cyst 1.1 x 0.7 x 1.3,, gallbladder unremarkable, mpv patent, cbd 4mm, right kidney simple cyst 5.6 x 4.8 x 5.2 cm, echoegenic focus 1.3cm consider kidney stone.  His cholesterol is checked by Dr. Sachin Dimas. He says cholesterol has been ok.   Reminded pt re bone density and needs to be following locally and he has not done this and reminded to get with local provider.  11-13-19: spine normal and t score 0.1 and z score 0.1/ femur neck -0.9 and -0.4 and femur total -1.0 and -0.9. He shared with primary md. he will check with primary md to redo as been 2 yrs.  Plan: 1. Pt awaiting the new pbc drug that may be coming out by end of year and hopefully that will be option. 2. Pt will stay on ursodiol. 3. Pt will do the u.s now and we will see and redo labs in .   Duration of thevisit was 44 minutes with 10 minutes of chart prep and 34 minutes from 900 to 934 am by clock as did over several dox video sessions for dox teleMedvisit with time spent reviewing historical and recent records, discussing theircurrent status relative to same and reviewing future plans for the patient.

## 2024-01-29 ENCOUNTER — TELEPHONE ENCOUNTER (OUTPATIENT)
Dept: URBAN - METROPOLITAN AREA CLINIC 86 | Facility: CLINIC | Age: 84
End: 2024-01-29

## 2024-01-29 NOTE — HPI-TODAY'S VISIT:
Dear Roel Trejo, June 9, 2022 EGD was sent in by Dr. Vann' office  today.  She actually called me on Friday late after office closed to get the fax number and I advised her to have them call Sweta today to get the monitored fax line for urgent faxes. I also asked Sweta to call her office  today, to give her the exact fax that could be monitored and that led to an almost immediate receipt of the report which I was very happy to get. In that report she states that your duodenum was normal but you did have diffuse moderate inflammation characterized by congestion/edema with erosions and erythema in the gastric body and gastric antrum and that she did biopsies with cold forceps for this. She is that you had a small hiatal hernia with some grade B esophagitis which means some irritation of the esophagus. 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 She recommended a repeat endoscopy in 3 to 5 years, but I would recommend given your clinical course, that it be instead considered to be repeated in 2 years instead because of the fact that you had grade 1 varices and we need to see if those are getting larger or not with your liver disease issues staying on higher labs.  We can discuss this at your upcoming visit and confer also with Dr. Vann considers about this. My main concern is that the liver labs are remaining up and so I just want to make sure that this is not getting any worse in the interim.  I will send her a copy of this report for her review also.  We thank Dr Vann for sending this. Dr. Longoria

## 2024-04-08 ENCOUNTER — LAB (OUTPATIENT)
Dept: URBAN - METROPOLITAN AREA TELEHEALTH 2 | Facility: TELEHEALTH | Age: 84
End: 2024-04-08

## 2024-04-11 LAB
A/G RATIO: 1.3
ALBUMIN: 3.8
ALKALINE PHOSPHATASE: 461
ALT (SGPT): 90
AST (SGOT): 95
BASO (ABSOLUTE): 0
BASOS: 0
BILIRUBIN, TOTAL: 1.9
BUN/CREATININE RATIO: 19
BUN: 15
CALCIUM: 10
CARBON DIOXIDE, TOTAL: 26
CHLORIDE: 105
CREATININE: 0.8
EGFR: 88
EOS (ABSOLUTE): 0.1
EOS: 4
GLOBULIN, TOTAL: 2.9
GLUCOSE: 115
HEMATOCRIT: 43
HEMATOLOGY COMMENTS:: (no result)
HEMOGLOBIN: 14.2
IMMATURE CELLS: (no result)
IMMATURE GRANS (ABS): 0
IMMATURE GRANULOCYTES: 0
LYMPHS (ABSOLUTE): 1.6
LYMPHS: 46
MCH: 29.9
MCHC: 33
MCV: 91
MONOCYTES(ABSOLUTE): 0.5
MONOCYTES: 14
NEUTROPHILS (ABSOLUTE): 1.3
NEUTROPHILS: 36
NRBC: (no result)
PLATELETS: 106
POTASSIUM: 5.1
PROTEIN, TOTAL: 6.7
RBC: 4.75
RDW: 12.5
SODIUM: 141
WBC: 3.4

## 2024-04-24 ENCOUNTER — TELEP (OUTPATIENT)
Dept: URBAN - METROPOLITAN AREA TELEHEALTH 2 | Facility: TELEHEALTH | Age: 84
End: 2024-04-24
Payer: MEDICARE

## 2024-04-24 ENCOUNTER — LAB (OUTPATIENT)
Dept: URBAN - METROPOLITAN AREA TELEHEALTH 2 | Facility: TELEHEALTH | Age: 84
End: 2024-04-24

## 2024-04-24 VITALS — HEIGHT: 65 IN | WEIGHT: 170 LBS | BODY MASS INDEX: 28.32 KG/M2

## 2024-04-24 DIAGNOSIS — K74.02 HEPATIC FIBROSIS, ADVANCED FIBROSIS: ICD-10-CM

## 2024-04-24 DIAGNOSIS — K76.0 FATTY LIVER: ICD-10-CM

## 2024-04-24 DIAGNOSIS — R74.8 ELEVATED ALKALINE PHOSPHATASE LEVEL: ICD-10-CM

## 2024-04-24 DIAGNOSIS — K74.3 PBC (PRIMARY BILIARY CIRRHOSIS): ICD-10-CM

## 2024-04-24 PROCEDURE — 99214 OFFICE O/P EST MOD 30 MIN: CPT

## 2024-04-24 RX ORDER — URSODIOL 500 MG/1
TAKE 1.5 IN AM AND 1 IN PM ALTERNATING 1 PO TWICE A DAY TABLET ORAL
Qty: 202.5 TABLETS | Refills: 1 | Status: ACTIVE | COMMUNITY

## 2024-04-24 RX ORDER — MIRABEGRON 25 MG/1
1 TABLET TABLET, FILM COATED, EXTENDED RELEASE ORAL ONCE A DAY
Status: ACTIVE | COMMUNITY

## 2024-04-24 RX ORDER — AMLODIPINE BESYLATE 5 MG/1
TAKE 1 TABLET (5 MG) BY ORAL ROUTE ONCE DAILY TABLET ORAL 1
Qty: 0 | Refills: 0 | Status: ACTIVE | COMMUNITY
Start: 1900-01-01

## 2024-04-24 RX ORDER — GABAPENTIN 300 MG/1
1 CAPSULE CAPSULE ORAL TWICE A DAY
Status: ACTIVE | COMMUNITY

## 2024-04-24 RX ORDER — URSODIOL 500 MG/1
TAKE 1.5 IN AM AND 1 IN PM ALTERNATING 1 PO TWICE A DAY TABLET ORAL
Qty: 202.5 TABLETS | Refills: 1

## 2024-04-24 RX ORDER — FAMOTIDINE 40 MG/1
1 TABLET AT BEDTIME TABLET, FILM COATED ORAL ONCE A DAY
Status: ACTIVE | COMMUNITY

## 2024-04-24 NOTE — EXAM-PHYSICAL EXAM
Telemed self reported:  Gen: no distress. Eyes: no jaundice. Mouth: no thrush. CV: no chest pain. Resp: no wheezes. Abd: no pain. Ext: no edema.	 Neuro: no weakness. Opt out

## 2024-04-24 NOTE — HPI-TODAY'S VISIT:
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  April 10 labs show glucose elevated at 115 but down from 136 in January. BUN of 15 creatinine 0.8 sodium 141 potassium 5.1 calcium 10.0 albumin 3.8 and these are stable for you. Bilirubin was down to 1.9 from 2.2 so that was good to see. Alk phos also lower at 461 AST slightly low at 95 and ALT slightly lower at 90. Need to keep following these labs for trends. Good to see them at least  not going up anymore like they did back in January. WBC 3.4 hemoglobin 14.2 platelet count 106 a little bit down from 127. Neutrophils and lymphocytes were checked with a neutrophils low at 1.3 and lymphocytes normal at 1.6.  3- Wrote for knee surgery and waiting heart doctor letter for this. Vocal-Jose score using these labs: 30 day mortality: 0.9% 90 day mortality: 2.2% 180 day mortality: 4.1% 90 day decompensation 5.5%. Estimated Meld was 9.0 and meld na 9.0 as no recent INR.  We recommended he do an updated INR and coags closer to surgery now to enasure that his labs remain stable and that these estimates are reflective of the current state. He has cirrhosis but is not decompensated otherwise and patients with cirrhosis do better if they are not decompensated and do peripheral surgeries more so than central surgeries. Risk is also less with elective vs urgent surgery timing.We would recommend: 1. Avoid nsaids given hx of cirrhosis.2. Limit acetaminophent to less than 2 gm per day. 3. Would have anesthesia be aware of his liver history, 4. Would recommend cardiac clearance from primary/cardiology given his age.  Local [facility] imaging ultrasound from February 13 was sent in to us. Unclear why we had not received it until now. Liver had a nodular surface contour and a heterogeneous appearance of the liver parenchyma.  No discrete mass was seen.  Portal vein appeared patent with appropriate directional flow. Pancreas was normal were seen. Gallbladder showed no evidence of inflammation or sludge.  Negative Wick sign.  Normal wall thickness was seen.  6 mm gallbladder wall echogenic focus was seen. Common bile duct showed no evidence of duct dilation. Right kidney showed no hydronephrosis was 10.6 cm.  You had a 5.8 cm simple appearing cyst and an 8 mm echogenic focus. Left kidney showed no hydronephrosis and was 11 cm with a 1.1 cm simple cyst seen.  Please share with local providers. Spleen was mildly enlarged at 13.1 cm. In regards to your aorta, you do have some signs of scattered atherosclerotic plaque.  Aorta measured up to 2.5 cm.  Important to share with your primary provider so that they can follow your cholesterol issues in regards to this. Liver vessels were patent. Splenic vessels were patent. In summary, they felt that you had a cirrhotic appearing liver without any definite liver lesions and mild splenomegaly.  They suspect that you may have a 6 mm possible gallbladder polyp versus adherent stone and a possible 8 mm right kidney nonobstructive calculus versus echogenic area. I went back and looked at your prior ultrasound from last year and it had suggested that the gallbladder polyp at that timeframe was 3 mm in size and said it was a possible polyp versus stone. We need to talk about considering doing a follow-up ultrasound in 3 months to check if the gallbladder echogenic focus continues to grow and if so, we need to talk with surgery about considering a gallbladder surgery for that if it indeed continues to grow.  He may need to talk with Dr Vann re redo egd.  June 9, 2022 EGD was sent in by Dr. Vann' office.  She actually called me on Friday late after office closed to get the fax number and I advised her to have them call Sweta today to get the monitored fax line for urgent faxes. I also asked Sweta to call her office  today, to give her the exact fax that could be monitored and that led to an almost immediate receipt of the report which I was very happy to get. In that report she states that your duodenum was normal but you did have diffuse moderate inflammation characterized by congestion/edema with erosions and erythema in the gastric body and gastric antrum and that she did biopsies with cold forceps for this. She is that you had a small hiatal hernia with some grade B esophagitis which means some irritation of the esophagus. 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 She recommended a repeat endoscopy in 3 to 5 years, but I would recommend given your clinical course, that it be instead considered to be repeated in 2 years instead because of the fact that you had grade 1 varices and we need to see if those are getting larger or not with your liver disease issues staying on higher labs.  We can discuss this at your upcoming visit and confer also with Dr. Vann considers about this. My main concern is that the liver labs are remaining up and so I just want to make sure that this is not getting any worse in the interim.   January 16 labs show sugar slightly up at 136 and unclear if you were fasting or not? BUN of 13 creatinine 0.81 sodium 141 potassium 4.7 chloride 102 and CO2 of 26 and these are normal. Your calcium was slightly up at 10.3 with normal being from 8.6 up to 10.2. Have you been taking any calcium or vitamin D supplements lately? Not on vitamin supplements but eating little more. Albumin was 4.2 normal. Bilirubin slightly higher at 2.2 from 1.6 and alk phos 483 from 442. AST up slightly to 96 and ALT also up to 107. Any recent illness or other issues that may have caused the labs to fluctuate up versus your prior labs? WBC 3.9 hemoglobin 14.5 and platelet count slightly lower at 127 from 132 before. MCV 90. Neutrophils were normal at 1.5 and lymphocytes 1.8 which would suggest less likely for you to have been ill recently.  No recent illnes. Says exercise is less due to weather.  10/16/23 telemed  Oct 10 labs show wbc 3.9 and hg 13.8 and platelets 132 little lower and prior normal 159. Mcv normal 90. Neutrophils 1.3 little low and lymphs 1.8 normal. Glucose 124 elevated and prior 124 also. Were you fasting this day? Bun 16 and cr 0.85 and stable.Na 140 and k 4.4 and cl 103 and co2 26. Ca 9.8 and alb 3.8 and tb 1.6 little lower from 1.8.Alk 442 little lower from 496. Ast 83 and alt 81 and both down from prior 108 ast and alt 98.  October 9, 2023. Also saw that i was listed as your ordering provider for this (I do not see that we have ordered this) and it states that you have no evidence of any abdominal aortic aneurysm.  August 30 labs show albumin stable at 4.1, bilirubin down to 1.8 from 2.0. Direct bilirubin 1.1. Alk phos 456 slightly up from 432 in May. AST 85 down from 105. ALT 82 down from 103 in May. Glad to see that that this is starting to look like it is coming down lets see what the trend is.  July 10 2023 and was reviewed by Dr. Hopkins per the note. And mentioned that you had osteopenia with your spine score being -0.7 the left hip -1.3 and the left femoral neck being -1.6. There is a suture fracture risk for the hip is 0.7% and major osteoporotic fracture risk is 2.5% over the next 10 years.  July 10 2023 ultrasound shows liver coarsened in its echotexture with blunting of the liver edges and mildly nodular contour.  No lesions seen. No dilated bile ducts seen and common bile duct 4.6 mm. Small gallbladder polyp seen measuring 3 x 3 x 3 mm.  Wick sign negative. Of note, prior u.s in Jan 2023 did not show this and so we need to follow this over time. Typically the concern is if this is a gallbladder polyp and not a stone is if it grows to 10 mm or more in size. Pancreas not seen due to overlying structures. Right kidney 10.4 cm with no hydronephrosis but with a simple right renal cyst measuring 6.4 x 6.7 x 3.9 cm that was previously 6.5 x 4.0 x 5.5 cm.  Punctate echogenic focus seen in the mid right kidney measuring 0.5 x 0.7 x 0.4 cm favored to be a stone. Left kidney 10.6 cm with no hydronephrosis. Spleen 13.2 cm. Liver vessels were patent which was good to see. In summary, they see chronic liver disease but without any sonographically evident lesions and patent vessels. They feel that this is a small gallbladder echogenic focus measuring 3 mm that they think is a pedunculated polyp versus less likely a stone. We will need to monitor this and look for any changes over time.  July 6 labs show lipase normal at 41, INR normal at 1.0, glucose was elevated at 124 down from 135. BUN of 15 creatinine normal 0.85 sodium 138 potassium 4.4 calcium 9.8 albumin 3.9. Bilirubin slightly higher at 1.8 from 1.7.  Alkaline phosphatase 496 up slightly from 491.   from 98 and ALT 98 from 94. WBC 3.9, hemoglobin 13.7 platelet count 159 MCV 92.  Neutrophils 1.6 and lymphocytes 1.5.  Sadly for these labs our only option would be to consider trying the Ocaliva again which had side effects for you versus continuing to wait for new or medicines for PBC to come out which may be next year or the following.  There is a medicine from [place] that they are trying to get approved.  That would be the next 1 to come out.  June 8 labs show sugar elevated at 135 and previously 119.  Have you been fasting when you do these labs?  There clearly are remaining elevated. BUN of 13 creatinine 0.756 sodium 141 potassium 4.6 chloride 106 CO2 of 25 albumin 3.9. Bilirubin slightly higher at 1.7 from 1.6.  Alk phos slightly higher at 491 from 452.  AST up to 98 from 87 and ALT up to 94 from 88.  Unclear to me if the sugars could also be making the liver labs be trending higher?  Have you done a hemoglobin A1c with primary provider to see what that shows? WBC 3.9 hemoglobin 13.6 platelet count slightly low at 147 MCV 92 and neutrophils 1.6 and lymphocytes 1.6. I think that looking at and clarifying the issue as to your need for getting the sugars to be optimal may be a reasonable issue to focus on for you and seeing what that does may help lower these. It is known that  sugars if not controlled can impact many other liver diseases.  May 10 labs show albumin 4.1, bilirubin elevated 2.0 and was 1.37 on the direct.  Alk phos was 452 slightly lower.  AST was slightly higher at 105 from 93 and ALT was 103 up from 98. Called pt to see if he is on a new meds. He denies any herbs or teas. No illness. No antibiotics. No nsaids. Not eating any excess of any vegetable supplements. Wt  was 77.5 kg and so now 1165 is max 1000 alternate 1250mg a day to get to dose that fits weight now.  Sweta was able to obtain your ultrasound and gave it to me, which was dated January 9, 2023, and it mentions that your liver had increased geographic echogenicity with macro and micronodular contour but no definite lesions. Liver showed no dilated bile ducts and common bile duct 3.4 mm. Gallbladder normal. Pancreas normal. Right kidney measured 9.5 cm with normal echogenicity and no hydronephrosis and simple right renal cyst measuring 6.5 x 4 cm and was previously 6.4 x 6 cm.  This was better evaluated on the more recent MRI.  Nonshadowing echogenic focus seen in the mid renal pole measuring 1.4 x 1.1 cm and felt likely corresponding to renal sinus fat. Left kidney measure 11.4 cm with normal echogenicity. Spleen was normal at 12 cm. Liver vessels were patent. Given this you need to repeat the scan in 6 months which would be then in the July timeframe. We will put in the order for this to be done in July for you.   April 7 labs show sugar still elevated at 119 but down from 131 December.  Please share with primary provider.  BUN is 17 creatinine 0.77 which is actually better than in December when it was 0.89.  Both however are normal range. Sodium 141 potassium 4.8 and chloride 101 which is normal. Your calcium was up at 10.5.  Are you taking a calcium and vitamin D supplement?  If you are that sometimes can cause this.  Please share with primary provider as this was normal before at 10.2 and prior to that 9.7. Asked pt and he is on vit d and regular vitamin. Asked him to check with primary re that as his calcium was high. Albumin normal at 4.1. Bilirubin slightly higher this time at 1.6 and previously 1.1.  Alkaline phosphatase was higher at 452 from 423 and AST was higher at 87 and ALT of 88 from prior levels. Asked pt if he was ill and says has been a bit stressed as son ill.  March 7 labs show albumin stable at 4.5 and actually rising which is good to see.  Bilirubin down to 1.1 from 1.3 with a direct 0.68.  Alk phos slightly higher at 470 from 427 AST and ALT slightly higher at 93 and 98 from previous AST of 84 and ALT of 82. Not really see in the labs drop yet and still slightly going up.  Again just confirming no new meds or herbal remedies?  Please let us know.  February 6 labs show albumin 4.4 which is normal and in fact a little higher for you.  Total bilirubin still 1.3 as before on January 9.  Direct bilirubin 0.68.  Alk phos went up a little to 427 from 400 and AST 84 and ALT 82 with previous AST 63 and ALT 69.  Egd from June 9 from Dr Vann. Told not due yet. Gastric body and gastric antrum with congestion, erosions, and erythema. Duodenum was normal. Small hiatal hernia present. LA grade B esophagitis seen in esophagus. 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 They mentioned to redo in 3-5 yrs? They ordered pantoprazole 40mg po qd.  December 21 2022 labs show INR normal at 1.0 which is good to see.  Is actually lower than back in September when it was 1.1. Sugar was elevated at 131 and as we mentioned before that may be related to you doing the labs not fasting.  Please share with primary providers. BUN of 17 creatinine 0.89 sodium 141 potassium 4.8 calcium 10.2 albumin 4.4 bilirubin 1.1 alkaline phosphatase went up a little to 423 from 323.  Any recent issues?  AST was 72 ALT of 70 up from 57 and 49. White blood cell count 4.1 hemoglobin 14.6 platelet count 151 MCV 98 neutrophils 2.2 lymphocytes 1.3. Called pt: took a medicine for his knees: meloxicam he took and has 8% risk to raise the labs. So that is what did that.  He did stop that.   September 19 labs show white blood cell count 3.6 hemoglobin 14.9 platelet count 145 down from 164.  Normal is from 150 up to 450.  1 year ago and September 3, 2021 the platelet count was about the same at 144. Neutrophils 1.6 lymphocytes 1.4 both normal.  Sugar was slightly up at 148 from previous 137.  BUN of 12 Cr 0.68.  Sodium 143 potassium 4.2 albumin 4.2 bilirubin normal at 0.6 and down from 0.9. Alkaline phosphatase down from 404 to 323.  AST slightly lower at 57 from 58.  ALT down to 49 from 58.  So they are moving in the right direction. INR normal at 1.1. Meld 7 and meld na 7 so remains low.  June 20 labs showed sugar elevated at 138.  BUN of 14 creatinine 0.79 sodium 140 potassium 4.6 chloride 102 CO2 27. Albumin 4.0 bilirubin 0.8 down from 1.1.  Urine bilirubin 0.38 down from 0.43.  Alkaline phosphatase 319 from 298 so it went up slightly.  AST went down to 45 and ALT to 46 from previous AST 60 and ALT 60. Overall the liver labs and the AST and ALT are lower and the alk phos slightly higher.  July 9 MRI sent in to me. Lower thorax shows minimal bibasilar atelectasis. Liver showed no significant fat or iron but does have chronic liver disease changes seen including lobar redistribution and nodular contour.  There are some reticular enhancement changes that are compatible with fibrosis.  No suspicious liver lesions seen.   Liver vessels are patent as expected. Small varices seen near the stomach and spleen.  Important to stay on top of egd surveillance for these issues. Recannulized periumbilical vein noted which is another sign of portal hypertension Spleen slightly enlarged at 13 cm. Pancreas and adrenal glands normal. Kidneys show some renal cysts and no further description was given. They did see your appendix and it appeared normal to them. They also saw some mild atherosclerotic disease of the abdominal aorta but without aneurysm.  Please share with primary providers to be aware of same. Degenerative changes seen of your spine. We may be able to look towards doing an ultrasound alternating with an MRI and we will discuss this further at your next visit.   March 15 labs show glucose elevated at 137 previously 143 and please share with primary provider.  BUN of 14 creatinine 0.86 sodium 139 potassium 4.8 calcium 10.0 albumin 4.2 bilirubin 0.9.  These other labs are normal range. Alkaline phosphatase did go up to 404 from previous 394 and prior 327.  AST came down from 63 to 58.  ALT came down from 68 to 58. Hill cell count 4.1 hemoglobin 14.9 platelet count 164 normal.  MCV 89. Neutrophils 1.4 and lymphocytes 1.9.  Ocaliva was on it had toxicity risk and not much lab benefit and being cirrhotic is higher risk and he is  getting older.  January 29 2022 MRI Lower thorax showed bibasilar atelectasis which means that the bases of the lungs were not fully expanded.  So metimes we can see that when you are in the MRI machine in that fixed position for a while.  Please share with primary provider. Liver showed no fat or iron but did have chronic liver disease changes including a nodular contour and lobar redistribution.  Some fibrosis changes seen.  No suspicious lesions.  Small left lobe hepatic cyst seen. Gallbladder normal with some unchanged mild intrahepatic bile duct dilation most pronounced in the periphery. Spleen was top normal in size at 13 cm. Pancreas, adrenal glands, and lymph nodes were normal. Stable renal cyst seen bilaterally. Mild atherosclerosis of the aorta seen without aneurysm.  Small varices near the esophagus and stomach so would need to stay on top of that EGD surveillance. Mild degenerative changes seen of the spine.  He did the covid 19 series and March 5 2021. He did the covid booster.  Meds off ocaliva for summer 2020.   November 30 labs show INR normal at 1.0 which is good to see.  Glucose currently 154 elevated and previously was 143 and prior to that 129.  All 3 were elevated.  We have discussed this previously.  Please share with primary providers per their review. BUN of 13 creatinine 0.84 sodium 142 potassium 4.8 chloride 102 CO2 of 27 calcium elevated at 10.3. Asked if he is on any calcium supplements?  he said not taking any that he knows. Previously was normal at 10.2 and prior 10.1.  Please share with primary provider also.  Albumin 4.5 bilirubin 1.0 which is normal.  Alkaline phosphatase elevated at 394 previously 327 and prior 309.  AST 63 and ALT 68 which appeared to be slightly higher from prior AST of 47 ALT 45. Asked if he had any new meds and he says he is a vit d supplement. White blood cell count 4 hemoglobin 14.8 platelet count slightly low at 146 but improved from last time at 144.  MCV normal at 90.  Neutrophils 1.5 and lymphocytes 1.7. Meld low at 6 and meld na 6.    September 3 labs show INR remains perfectly normal at 1.0 and was previously 1.1. Glucose still remains elevated at 143 previously 129 and share with primary provider.  BUN of 13 creatinine 0.84 sodium 140 potassium 4.5 calcium 10.2 albumin 4.1 bilirubin 1.0.  Previously bilirubin 0.7 but remains normal again at 1.0.  Alkaline phosphatase slightly up at 327 from 309 previously 355.  AST down to 47 from 55 from prior 63.  ALT 45 down from 51 and prior 59 so those continue to drop.  White blood cell count 4.3 hemoglobin 14.9 platelet count 144 which is slightly lower from 162.  MCV 90.  Neutrophils normal at 1.4. Meld remains low at 6 and meld na 6.  July 10 MRI shows the lower thorax to have bibasilar atelectasis. Liver shows morphologic changes of chronic liver disease with nodular contour and lobar redistribution.  They do see changes in the liver parenchyma that are suggestive of fibrosis.  More confluent fibrosis seen in the right lobe. No definite suspicious liver lesions seen.  You do have some perfusion anomalies which need to be rechecked in 6 months.  Scattered hepatic cysts are seen. Gallbladder was unremarkable and mild intrahepatic bile duct dilation was seen also more conspicuous in the right lobe versus the left.  This is felt to be compatible with your PBC diagnosis.  No extrahepatic bile duct dilation seen. Spleen top normal at 12.9 cm with small splenule in the left upper quadrant. Pancreas normal. Renal cysts were seen. Colon diverticulosis was noted as well. No inflammation however was seen in the colon. Mild paraesophageal perigastric and perisplenic varices were seen and moderate aortobiiliac atherosclerosis seen. Overall they felt you had signs of chronic liver disease but no evidence of any malignancy.   June 9 laboratories show white blood cell count 4.3 hemoglobin 13.8 platelet 162.  These are stable for you.  Platelet count is actually better than it was before at 151.  Neutrophils are stable at 1.6 and previously were 1.5.  Total bilirubin is down to 0.7 from 0.8.  Alkaline phosphatase is lower at 309 from 355.  AST down to 55 from 63 ALT 51 down from 59 and prior to that 68.  So the liver labs continue to be dropping.  Sodium 139 potassium 4.6 chloride 102 CO2 26 BUN 17 creatinine 0.93 glucose 129.  Sugar is actually lower than the previous time at 146. INR 1.1.   Meld score low at 7 and meld na 7.  April 27: alb 4.3 and tb 1.1 and db 0.43 and alk 298 and ast 60 and alt 60.  March 2: alk 355 and ast 63 and alt 59 so in fact alk phos lower now to 298 and ast and alt about the same.  8 weeks off labs March 2 and inr 1.0 and tb 0.8 and alk 355 and down from 376 and prior 392. ast 63 and alt 59 and down from 65 and 68 so little lower.  ca 10.0. na 140 and k 5.0 and glu 146 elevated. bun 18 and cr 0.9. wbc 3.9 and hg 14.9 and plat 151 and mcv 90.   4 weeks off labs: jan 25 2021 and inr normal 1.0 and so little lower now. tb 0.8 and lower from 0.9 and alk 376 from 392 so lower and ast 65 and alt 68 and prior ast 60 and alt 68 so not much of a change seen. na 143 and k 4.6 and cl 103 and co2 23 and cr 0.93. glu 128 elevated and mentioned to him. defer to local md re your sugars. cr 0.93. wbc 4.1 hg 14.5 and plat 138 (down from 165) and mcv 89.  Jan 23 mri: Morphologic changes of chronic liver disease without  hepatocellular carcinoma seen so that is good to note. Patent portal venous system with stigmata portal hypertension, including upper abdominal varices and splenomegaly noted so need to stay on top of egd surveillance. Right renal cyst with area of complexity on prior ultrasound demonstrates no septation, enhancement, or nodularity on this examination, compatible  with a simple cyst ( 4.7 x 6.2 x 7.8 cm.) This may have represented artifact on prior ultrasound. Continued attention on follow-up for liver disease was recommended. Liver showed no fat or iron. Scattered simple cysts in the left hepatic  lobe. Periesophageal, perigastric, perisplenic varices seen. Mild intrahepatic duct dilatation peripherally extending to the capsule, greater in the right hepatic lobe, compatible with primary biliary cirrhosis. No extrahepatic biliary ductal dilatation. Normal gallbladder. Spleen was enlarged measuring 13.1 cm in the craniocaudal dimension.Adjacent splenule. Pancreas normal. Mild atherosclerotic disease abdominal aorta without aneurysm. Mild degenerative changes of the spine.  Document Type: MRI Abdomen w/ + w/o Contrast Document Date: January 23, 2021 09:35 Document Status: Auth (Verified) Document Title: MRI Abdomen w/ + w/o Contrast Performed By: Jason Kapadia Verified By: Jason Kapadia on January 25, 2021 07:35 Encounter info: [id number], [facility], Single Visit OP, 1/23/2021 - 1/23/2021 * Final Report * Reason For Exam PBC//HEPATIC FIBROSIS//DIABETES//FATTY LIVER//RENAL CYST//ELEVATED ALKALINE PHOSPHATASE LEVEL REPORT EXAM: MRI Abdomen w/ + w/o Contrast CLINICAL INDICATION: PBC//HEPATIC FIBROSIS//DIABETES//FATTY LIVER//RENAL CYST//ELEVATED ALKALINE PHOSPHATASE LEVEL. TECHNIQUE: Multisequence, multiplanar MRI of the abdomen was performed without and with intravenous contrast. [id number] CONTRAST: 16 cc of Prohance COMPARISON: Outside MRI dated 9/1/2020. Abdominal ultrasound dated 8/10/2020. FINDINGS: Lower Thorax: Normal. Liver: No fat or iron. Morphologic changes of chronic liver disease, including lobar redistribution and nodular contour. Delayed reticular enhancement of the hepatic parenchyma, compatible with fibrosis. More confluent fibrosis in the right hepatic lobe. Scattered simple cysts in the left hepatic lobe. No hepatocellular carcinoma. Hepatic vasculature is patent. Recannulized paraumbilical vein. Periesophageal, perigastric, perisplenic varices. Gallbladder/Biliary Tree: Mild intrahepatic duct dilatation peripherally extending to the capsule, greater in the right hepatic lobe, compatible primary biliary cirrhosis. No extrahepatic biliary ductal dilatation. Normal gallbladder. Spleen: Enlarged measuring 13.1 cm in the craniocaudal dimension. Adjacent splenule. Pancreas: Normal. Adrenal Glands: Normal. Kidneys/Ureters: Renal cysts. Right renal cyst with area of complexity on prior ultrasound demonstrates no septation, enhancement, or nodularity on this examination and measures 4.7 x 6.2 x 7.8 cm. Gastrointestinal: No bowel obstruction. Lymph Nodes: Normal. Vessels: Mild atherosclerotic disease abdominal aorta without aneurysm. Peritoneum/Retroperitoneum: Normal. Bones/Soft Tissues: Mild degenerative changes of the spine. IMPRESSION: 1. Morphologic changes of chronic liver disease without hepatocellular carcinoma. 2. Patent portal venous system with stigmata portal hypertension, including upper abdominal varices and splenomegaly. 3. Right renal cyst with area of complexity on prior ultrasound demonstrates no septation, enhancement, or nodularity on this examination, compatible with a simple cyst. This may have represented artifact on prior ultrasound. Continued attention on follow-up for liver disease. Signature Line *** Final ***  Electronically Signed By: Jason Kapadia on 01/25/2021 07:35 Dictated by: Jason Kapadia  2 weeks off ocaliva: Sanju 15: glu 117 elevated some but similar to nov 117 but down from 204 in dec 30. bun 16 and cr 0.91. na 141 and k 4.7 and cl 102 and ca 10.1 and alb 4.2 and tb 0.9 down from 1.1 in nov. ast 60 and alt 68 and so about the same as you can see vs other two labs and alk 392  about the same vs dec but little up from nov 354. wbc 4.1 hg 14.8 plat 165. inr 1.1 stable. So no dramatic changes seen yet that point.  Dec 30 2020 labs in middle: wbc 3.9 hg 15.1 plat 167. mcv 97. tb 0.9 down  from 1.1 and alk 399 slightly higher from 354. ast 64 and alt 66 and prior ast 63 and alt 60. na 139 and k 4.7 and cl 99 and co2 27. glu 204 elevated so that is newer issue as prior 117 and 134 so show to local md. bun 15 and cr 1.07 little up and prior 0.85 so little dry and could be linked to the sugars.  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  He is on urosodiol 500mg BID and had been on for years Ocaliva 10mg q other day due to pruritis and then off due to pruritis.  He weighs 183. Max dose 1100 to 1250 range 13-15mg/kg and we can ursodiol to 1.5 in the am and 1 in the evening. that may help and will avoid something more risky.  Itching much better on the gabapentin and off the ocaliva.  Nov 25 2020: wbc 4.2 hg 15 and plat 150 and mcv 90. Neutrophils 1.3 and prior 1.4 and lymphs 2.0 and prior 1.9. tb 1.1 and alk 354 and ast 63 and alt  60 and prior tb 0.9 and alk 343 and ast 55 and alt 61. So about the same. glu 117 and down from 134. bun 15 and cr 0.85 and na 139 and k 4.4 and cl 101 and co2 25. alb 4.2 normal.  Saw local mri: 9-1 20: nonenhancing renal cyst and no definite renal mass. Largest cyst right kidney 5.7cm. Not surprisingly they thought liver appeared to be cirrhotic. Also apparent nonenhancing cysts in the liver up to 10.6mm. Nonspecific biliary dilation in liver. Extrahepatic duct appears decompressed. Nonspecific splenomegaly seen and no significant varices seen. Left adrenal suspected adenoma. Appendix seemed somewhat prominent to then at 6.4mm but was probably similar to prior per them. No definite gallstones. Prior [place] imaging liver coarsened. They recommended mri to better see possible complex renal cyst.  Saw urologist re the kidney issue was stable. They were to see him again in feb 2021 and had been changed.  The patient relates no significant family or personal history of liver disease. He states no history of new medications or alcohol use. The  patient reports a personal history of no other habits that could cause liver damage.  July 2020: tsh very low and show local md. T4 normal 8.5. inr 1.1 and tb 0.9 and alk 343 and ast 55 and alt 61 and tb 0.9 and alb 4.2 and ca 10.6 elevated and show local md also. na 144 and k 4.8. glu 134 elevated and maybe not fasting. cr 0.83. wbc 4.1 hg 15.3 plat 170.  Prior April ast 67 and alt 66 and alk 362 and tb 0.9 so liver labs little lower this last time despite the low dose and alk little lower also. calcium prior 10.1 and is not on any calcium supplements.  He says local doctor following thyroid and says he is doing better.  4- wbc 4.0 and hg 14.3 and plat 155 and neutrophils 1.2 little low. glu 130 and cr 0.78 and na 140 and k 4.4 and cl 101 and co2 22 and alb 4.3 and tb 0.9 and alk 362 and ast 67 and alt 66 and inr 1.1.  2-17-20 and wbc 4.1 hg 14.5 plat 153 and neutrophils 1.3 and glu 143 and cr 0.96 and na 140 and k 4.3 and cl 100 and co2 25 and alb 4.0 and tb 0.8 and alk 352 and ast 66 and alt 61 and inr 1.0.  12-16-19 and wbc 3.6 and hg 14.3 and plat 184 and neutrophils 1.3 and glu 124 and cr 0.86 and na 142 and k 5.0 and cl 102 and cl2 25 and alb 4.3 and tb 0.9 and alk 353 and ast 70 and alt 67 and inr 1.0.  Document Type: US Abdomen Doppler Complete Document Date: August 10, 2020 10:04 Document Status: Auth (Verified) Document Title: US Abdomen Doppler Complete Performed By: Jack Martin Verified By: Candelaria Rodriguez on August 10, 2020 11:41 Encounter info: [id number], [facility], Single Visit OP, 8/10/2020 - * Final Report * Reason For Exam primary billary cholangitis REPORT EXAM: US Abdomen Doppler Complete, US Abdomen Complete CLINICAL INDICATION: Primary billary cholangitis. TECHNIQUE: Grayscale, pulsed wave and color Doppler sonography of the upper abdomen were performed. [id number] COMPARISON: None FINDINGS: Liver: Coarsened echotexture. No lesions. Bile Ducts: No dilated intrahepatic biliary radicles. Common duct measures 4 mm. Gallbladder: Normal. Wick's sign is negative. Pancreas: Partially obscured by overlying structures. Right Kidney: Measures 11.4 cm. Normal echogenicity. No hydronephrosis. 6.4 x 5.9 x 5.3 cm inferior pole cystic lesion with internal thickened septation versus areas of nodularity, indeterminate. Hyperechoic nonshadowing cortical focus measuring 0.5 x 0.4 x 0.2 cm.. Nonshadowing hyperechoic focus near the corticomedullary junction measuring 1.1 x 0.8 x 0.6 cm. Left Kidney: Measures 10.6 cm. Normal echogenicity. No hydronephrosis. Spleen: Measures 12.2 cm. Aorta: Normal caliber where imaged. IVC: Normal proximally, not imaged distally. Hepatic Veins: Normal. Portal Veins: Hepatopetal flow. Velocity of 27 cm/s in the main portal vein, 25 cm/s in the right portal vein, and 20 cm/s in the left portal vein. Hepatic Arteries: Peak systolic velocity 115 cm/s. Resistive index 0.77. Other: None. IMPRESSION: 1. Complex cystic lesion within the right kidney with thickened septation/areas of nodularity. Recommend MRI for further evaluation to exclude enhancing/solid components. Nonshadowing echogenic foci in the right kidney may represent nonobstructing stones although underlying lesions cannot be excluded. This can be evaluated at the time of MRI. 2. Coarsened hepatic echotexture can be seen with hepatic steatosis or chronic liver disease. No intra or extrahepatic biliary ductal dilation. Patent hepatic vasculature. The images were reviewed and interpreted by Candelaria Rodriguez MD. Signature Line *** Final *** Electronically Signed By: Candelaria Rodriguez on 08/10/2020 11:41 Dictated by: Jack Martin  12- u/s coarse hepatic ehcotexure and consistent with cirrhosis and 1.2cm hepatic cyst. Patent vessels and right renal cysts up to 5.7cm and some nonobstructing right renal calculi. Spleen 12.3cm.  Oct 14 2019 wbc 3.8 and hg 14.4 plat 177 and neutrophils 1.3 little low and glu 123 and cr 0.74 and na 141 and k 4.5 and cl 100 and co2 25 and alb 4.3 and tb 0.9 and alk 336 ast 67 and alt 61. hep b immune 40.5  Sept 18 2019 na 140 and k 4.7 and cl 103 and glu 121 and bun 12 and cr 0.79 alb 3,8 and tb 0,9 and ca 9.6 and ast 49 and alt 48 and alk 334 and a1c 5.7 and chol 228 and trg 52 and hdl 86 and ldl 132 nd psa 0.01 and wbc 4.2 and hg 144 and plat 183.  June 19 2019 and liver midly coarse and 1cm hepatic cyst and stable and gb not distended and no stones and bile ducts not dilated and spleen stable 12.3cm abd right kdiney 11cm and several right kidney cysts up to 6.2cm. Saw stone sin the right kidneuy. No hydronephrosios. Vessels patent. No change vs 2018.  Dec 2018 u.s with liver appearing fatty and patent vessels. Right kidney cyst 6.1x4.7x5.3cm stable abd simple. Liver cyst 1.1x0.8x1.1.cm. Similar to prior scan.  April 2019 labs wbc 4.3 hg 14.7 plat 183 and glu 134 and cr 0.85 and na 141 k 5.1 and tb 1.0 and alk 446 and ast 85 and alt 91.  Feb 2019 alk 418 and ast 79 and alt 81.  Dec 2018 alk 440 and tb 0.9 and db 0.48 and ast 76 and alt 92. Liver 76% and bone 20% and intestine 4%.  11/26/2018: wbc 4.6, hgb 14.7, plts 175,000, gluc 130, vet 0.89, na 142, k 4.0, alb 4.4, frances 2.9, t.bili 1.0, , AST 75, ASLT 80,INR 1.0, TSH 2.26  9/04/2018: wbc 4.6, hgb 14.4 plts 166,000, gluc 130, creat 0.95, na 143, k 4.5, alb 4.3, frances 3.1, t.bili 0.8, , AST56, ALT 62, INR 1.0, TSH 2.17,  6/05/2018: HBsAb 4.5, HBsAg neg, HBcAb neg, HAV pos  6/20/2018: liver echogenic suggesting mild fatty infiltration, simple cyst 1.1 x 0.7 x 1.3,, gallbladder unremarkable, mpv patent, cbd 4mm, right kidney simple cyst 5.6 x 4.8 x 5.2 cm, echoegenic focus 1.3cm consider kidney stone.  His cholesterol is checked by Dr. Sachin Dimas. He says cholesterol has been ok.   Reminded pt re bone density and needs to be following locally and he has not done this and reminded to get with local provider.  11-13-19: spine normal and t score 0.1 and z score 0.1/ femur neck -0.9 and -0.4 and femur total -1.0 and -0.9. He shared with primary md. he will check with primary md to redo as been 2 yrs.  Plan: 1. Pt waiting for heart clearance for surgery. 2. Pt will stay on ursodiol and waiting for the new meds for pbc to come out. 3. Pt will do u.s in aug.  Duration of thevisit was 30 minutes with 10 minutes of chart prep and 20 minutes from 126 to 146 by clock for this dox [employer] as internet with time spent reviewing historical and recent records, discussing theircurrent status relative to same and reviewing future plans for the patient.

## 2024-07-08 ENCOUNTER — LAB OUTSIDE AN ENCOUNTER (OUTPATIENT)
Dept: URBAN - METROPOLITAN AREA TELEHEALTH 2 | Facility: TELEHEALTH | Age: 84
End: 2024-07-08

## 2024-07-16 ENCOUNTER — TELEPHONE ENCOUNTER (OUTPATIENT)
Dept: URBAN - METROPOLITAN AREA CLINIC 86 | Facility: CLINIC | Age: 84
End: 2024-07-16

## 2024-07-16 LAB
ALBUMIN: 3.8
ALKALINE PHOSPHATASE: 405
ALT (SGPT): 71
AST (SGOT): 84
BASO (ABSOLUTE): 0
BASOS: 1
BILIRUBIN, TOTAL: 1.9
BUN/CREATININE RATIO: 16
BUN: 15
CALCIUM: 9.4
CARBON DIOXIDE, TOTAL: 24
CHLORIDE: 105
CREATININE: 0.96
EGFR: 78
EOS (ABSOLUTE): 0.2
EOS: 5
GLOBULIN, TOTAL: 2.9
GLUCOSE: 108
HEMATOCRIT: 40.5
HEMATOLOGY COMMENTS:: (no result)
HEMOGLOBIN: 13.2
IMMATURE CELLS: (no result)
IMMATURE GRANS (ABS): 0
IMMATURE GRANULOCYTES: 0
INR: 1.1
LYMPHS (ABSOLUTE): 1.8
LYMPHS: 50
MCH: 30.5
MCHC: 32.6
MCV: 94
MONOCYTES(ABSOLUTE): 0.5
MONOCYTES: 13
NEUTROPHILS (ABSOLUTE): 1.1
NEUTROPHILS: 31
NRBC: (no result)
PLATELETS: 108
POTASSIUM: 4.6
PROTEIN, TOTAL: 6.7
PROTHROMBIN TIME: 11.6
RBC: 4.33
RDW: 13.3
SODIUM: 141
WBC: 3.6

## 2024-07-16 NOTE — HPI-TODAY'S VISIT:
Dear Roel Trejo,   July 15 labs show glucose down to 108 from 115 and prior 136.    BUN of 15 creatinine little bit higher at 0.96 from 0.8.  Many people lately have been running a little bit higher due to the increase in heat on their hydration and kidney function. Working on your hydrational status with the heat may help with that to be back to usual. Please share with local providers.   Sodium 141 potassium 4.6 calcium 9.4 albumin 3.8 and these are normal.   Bilirubin remains a little lower at 1.9 from prior January 2 0.2.  Alk phos a little lower at 405 from 461 and 483.  AST lowered at 84 from 95 and prior 96 ALT down to 71 from 90 and prior 117.  Is or anything different that you are doing that could be dropping this?    WBC 3.6 hemoglobin 13.2 plate count 108 which is a little low but up from 106 back in April. MCV normal 94.  Neutrophils remain a little low at 1.1 from 1.3 and lymphocytes normal at 1.8. INR normal at 1.1. Dr. Longoria.

## 2024-08-06 ENCOUNTER — DASHBOARD ENCOUNTERS (OUTPATIENT)
Age: 84
End: 2024-08-06

## 2024-08-06 ENCOUNTER — OFFICE VISIT (OUTPATIENT)
Dept: URBAN - METROPOLITAN AREA TELEHEALTH 2 | Facility: TELEHEALTH | Age: 84
End: 2024-08-06
Payer: MEDICARE

## 2024-08-06 VITALS — WEIGHT: 172 LBS | HEIGHT: 65 IN | BODY MASS INDEX: 28.66 KG/M2

## 2024-08-06 DIAGNOSIS — L29.9 PRURITIC CONDITION: ICD-10-CM

## 2024-08-06 DIAGNOSIS — K76.0 FATTY LIVER: ICD-10-CM

## 2024-08-06 DIAGNOSIS — K74.02 HEPATIC FIBROSIS, ADVANCED FIBROSIS: ICD-10-CM

## 2024-08-06 DIAGNOSIS — K74.3 PBC (PRIMARY BILIARY CIRRHOSIS): ICD-10-CM

## 2024-08-06 PROCEDURE — 99214 OFFICE O/P EST MOD 30 MIN: CPT

## 2024-08-06 RX ORDER — GABAPENTIN 300 MG/1
1 CAPSULE CAPSULE ORAL TWICE A DAY
Status: ACTIVE | COMMUNITY

## 2024-08-06 RX ORDER — MIRABEGRON 25 MG/1
1 TABLET TABLET, FILM COATED, EXTENDED RELEASE ORAL ONCE A DAY
Status: ACTIVE | COMMUNITY

## 2024-08-06 RX ORDER — ATENOLOL 25 MG/1
TAKE ONE TABLET BY MOUTH ONE TIME DAILY TABLET ORAL
Qty: 90 UNSPECIFIED | Refills: 0 | Status: ACTIVE | COMMUNITY

## 2024-08-06 RX ORDER — FAMOTIDINE 40 MG/1
1 TABLET AT BEDTIME TABLET, FILM COATED ORAL ONCE A DAY
Status: ACTIVE | COMMUNITY

## 2024-08-06 RX ORDER — URSODIOL 500 MG/1
TAKE 1.5 IN AM AND 1 IN PM ALTERNATING 1 PO TWICE A DAY TABLET ORAL
Qty: 202.5 TABLETS | Refills: 1 | Status: ACTIVE | COMMUNITY

## 2024-08-06 RX ORDER — AMLODIPINE BESYLATE 5 MG/1
1 TABLET TABLET ORAL ONCE A DAY
Refills: 0 | Status: ACTIVE | COMMUNITY
Start: 1900-01-01

## 2024-08-06 RX ORDER — URSODIOL 500 MG/1
TAKE 1.5 IN AM AND 1 IN PM ALTERNATING 1 PO TWICE A DAY TABLET ORAL
Qty: 202.5 TABLETS | Refills: 0

## 2024-08-06 NOTE — HPI-TODAY'S VISIT:
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  He got a new [employer] phone.  July 15 labs show glucose down to 108 from 115 and prior 136.   BUN of 15 creatinine little bit higher at 0.96 from 0.8. Many people lately have been running a little bit higher due to the increase in heat on their hydration and kidney function. Working on your hydrational status with the heat may help with that to be back to usual. Please share with local providers. Sodium 141 potassium 4.6 calcium 9.4 albumin 3.8 and these are normal. Bilirubin remains a little lower at 1.9 from prior January 2.2. Alk phos a little lower at 405 from 461 and 483. AST lowered at 84 from 95 and prior 96 ALT down to 71 from 90 and prior 117. Is or anything different that you are doing that could be dropping this? Says he was taken off the vitamin d3 and stopped mvi.  WBC 3.6 hemoglobin 13.2 plate count 108 which is a little low but up from 106 back in April. MCV normal 94. Neutrophils remain a little low at 1.1 from 1.3 and lymphocytes normal at 1.8. INR normal at 1.1.  2 new drugs approved pbc and headed to a meeting to learn more and we will see. These have less itching that the ocaliva does. They have less of the itching induction.  Last imaging feb and says u.s was rebooked and rebooked sept at [facility]>  April 10 labs show glucose elevated at 115 but down from 136 in January. BUN of 15 creatinine 0.8 sodium 141 potassium 5.1 calcium 10.0 albumin 3.8 and these are stable for you. Bilirubin was down to 1.9 from 2.2 so that was good to see. Alk phos also lower at 461 AST slightly low at 95 and ALT slightly lower at 90. Need to keep following these labs for trends. Good to see them at least  not going up anymore like they did back in January. WBC 3.4 hemoglobin 14.2 platelet count 106 a little bit down from 127. Neutrophils and lymphocytes were checked with a neutrophils low at 1.3 and lymphocytes normal at 1.6.  He has not had knee surgery and heart doctor told that he had to stabilize issues before does that. 3- Wrote for knee surgery Vocal-Jose score using these labs: 30 day mortality: 0.9% 90 day mortality: 2.2% 180 day mortality: 4.1% 90 day decompensation 5.5%. Estimated Meld was 9.0 and meld na 9.0 as no recent INR.  Local [facility] ultrasound from February 13 2024 was sent in to us. Unclear why we had not received it until now. Liver had a nodular surface contour and a heterogeneous appearance of the liver parenchyma.  No discrete mass was seen.  Portal vein appeared patent with appropriate directional flow. Pancreas was normal were seen. Gallbladder showed no evidence of inflammation or sludge.  Negative Wick sign.  Normal wall thickness was seen.  6 mm gallbladder wall echogenic focus was seen. Common bile duct showed no evidence of duct dilation. Right kidney showed no hydronephrosis was 10.6 cm.  You had a 5.8 cm simple appearing cyst and an 8 mm echogenic focus. Left kidney showed no hydronephrosis and was 11 cm with a 1.1 cm simple cyst seen.  Please share with local providers. Spleen was mildly enlarged at 13.1 cm. In regards to your aorta, you do have some signs of scattered atherosclerotic plaque.  Aorta measured up to 2.5 cm.  Important to share with your primary provider so that they can follow your cholesterol issues in regards to this. Liver vessels were patent. Splenic vessels were patent. In summary, they felt that you had a cirrhotic appearing liver without any definite liver lesions and mild splenomegaly.  They suspect that you may have a 6 mm possible gallbladder polyp versus adherent stone and a possible 8 mm right kidney nonobstructive calculus versus echogenic area. I went back and looked at your prior ultrasound from last year and it had suggested that the gallbladder polyp at that timeframe was 3 mm in size and said it was a possible polyp versus stone. We need to talk about considering doing a follow-up ultrasound in 3 months to check if the gallbladder echogenic focus continues to grow and if so, we need to talk with surgery about considering a gallbladder surgery for that if it indeed continues to grow.  May be due for egd with Dr Vann as done usually every 2 yrs.  June 9, 2022 EGD was sent in by Dr. Vann' office.  She actually called me on Friday late after office closed to get the fax number and I advised her to have them call Sweta today to get the monitored fax line for urgent faxes. I also asked Sweta to call her office  today, to give her the exact fax that could be monitored and that led to an almost immediate receipt of the report which I was very happy to get. In that report she states that your duodenum was normal but you did have diffuse moderate inflammation characterized by congestion/edema with erosions and erythema in the gastric body and gastric antrum and that she did biopsies with cold forceps for this. She is that you had a small hiatal hernia with some grade B esophagitis which means some irritation of the esophagus. 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 She recommended a repeat endoscopy in 3 to 5 years, but I would recommend given your clinical course, that it be instead considered to be repeated in 2 years instead because of the fact that you had grade 1 varices and we need to see if those are getting larger or not with your liver disease issues staying on higher labs.  We can discuss this at your upcoming visit and confer also with Dr. Vann considers about this. My main concern is that the liver labs are remaining up and so I just want to make sure that this is not getting any worse in the interim.   January 16 labs show sugar slightly up at 136 and unclear if you were fasting or not? BUN of 13 creatinine 0.81 sodium 141 potassium 4.7 chloride 102 and CO2 of 26 and these are normal. Your calcium was slightly up at 10.3 with normal being from 8.6 up to 10.2. Have you been taking any calcium or vitamin D supplements lately? Not on vitamin supplements but eating little more. Albumin was 4.2 normal. Bilirubin slightly higher at 2.2 from 1.6 and alk phos 483 from 442. AST up slightly to 96 and ALT also up to 107. Any recent illness or other issues that may have caused the labs to fluctuate up versus your prior labs? WBC 3.9 hemoglobin 14.5 and platelet count slightly lower at 127 from 132 before. MCV 90. Neutrophils were normal at 1.5 and lymphocytes 1.8 which would suggest less likely for you to have been ill recently.  No recent illnes. Says exercise is less due to weather.  10/16/23 telemed  Oct 10 labs show wbc 3.9 and hg 13.8 and platelets 132 little lower and prior normal 159. Mcv normal 90. Neutrophils 1.3 little low and lymphs 1.8 normal. Glucose 124 elevated and prior 124 also. Were you fasting this day? Bun 16 and cr 0.85 and stable.Na 140 and k 4.4 and cl 103 and co2 26. Ca 9.8 and alb 3.8 and tb 1.6 little lower from 1.8.Alk 442 little lower from 496. Ast 83 and alt 81 and both down from prior 108 ast and alt 98.  October 9, 2023. Also saw that i was listed as your ordering provider for this (I do not see that we have ordered this) and it states that you have no evidence of any abdominal aortic aneurysm.  August 30 labs show albumin stable at 4.1, bilirubin down to 1.8 from 2.0. Direct bilirubin 1.1. Alk phos 456 slightly up from 432 in May. AST 85 down from 105. ALT 82 down from 103 in May. Glad to see that that this is starting to look like it is coming down lets see what the trend is.  July 10 2023 and was reviewed by Dr. Hopkins per the note. And mentioned that you had osteopenia with your spine score being -0.7 the left hip -1.3 and the left femoral neck being -1.6. There is a suture fracture risk for the hip is 0.7% and major osteoporotic fracture risk is 2.5% over the next 10 years.  July 10 2023 ultrasound shows liver coarsened in its echotexture with blunting of the liver edges and mildly nodular contour.  No lesions seen. No dilated bile ducts seen and common bile duct 4.6 mm. Small gallbladder polyp seen measuring 3 x 3 x 3 mm.  Wick sign negative. Of note, prior u.s in Jan 2023 did not show this and so we need to follow this over time. Typically the concern is if this is a gallbladder polyp and not a stone is if it grows to 10 mm or more in size. Pancreas not seen due to overlying structures. Right kidney 10.4 cm with no hydronephrosis but with a simple right renal cyst measuring 6.4 x 6.7 x 3.9 cm that was previously 6.5 x 4.0 x 5.5 cm.  Punctate echogenic focus seen in the mid right kidney measuring 0.5 x 0.7 x 0.4 cm favored to be a stone. Left kidney 10.6 cm with no hydronephrosis. Spleen 13.2 cm. Liver vessels were patent which was good to see. In summary, they see chronic liver disease but without any sonographically evident lesions and patent vessels. They feel that this is a small gallbladder echogenic focus measuring 3 mm that they think is a pedunculated polyp versus less likely a stone. We will need to monitor this and look for any changes over time.  July 6 labs show lipase normal at 41, INR normal at 1.0, glucose was elevated at 124 down from 135. BUN of 15 creatinine normal 0.85 sodium 138 potassium 4.4 calcium 9.8 albumin 3.9. Bilirubin slightly higher at 1.8 from 1.7.  Alkaline phosphatase 496 up slightly from 491.   from 98 and ALT 98 from 94. WBC 3.9, hemoglobin 13.7 platelet count 159 MCV 92.  Neutrophils 1.6 and lymphocytes 1.5.  Sadly for these labs our only option would be to consider trying the Ocaliva again which had side effects for you versus continuing to wait for new or medicines for PBC to come out which may be next year or the following.  There is a medicine from [place] that they are trying to get approved.  That would be the next 1 to come out.  June 8 labs show sugar elevated at 135 and previously 119.  Have you been fasting when you do these labs?  There clearly are remaining elevated. BUN of 13 creatinine 0.756 sodium 141 potassium 4.6 chloride 106 CO2 of 25 albumin 3.9. Bilirubin slightly higher at 1.7 from 1.6.  Alk phos slightly higher at 491 from 452.  AST up to 98 from 87 and ALT up to 94 from 88.  Unclear to me if the sugars could also be making the liver labs be trending higher?  Have you done a hemoglobin A1c with primary provider to see what that shows? WBC 3.9 hemoglobin 13.6 platelet count slightly low at 147 MCV 92 and neutrophils 1.6 and lymphocytes 1.6. I think that looking at and clarifying the issue as to your need for getting the sugars to be optimal may be a reasonable issue to focus on for you and seeing what that does may help lower these. It is known that  sugars if not controlled can impact many other liver diseases.  May 10 labs show albumin 4.1, bilirubin elevated 2.0 and was 1.37 on the direct.  Alk phos was 452 slightly lower.  AST was slightly higher at 105 from 93 and ALT was 103 up from 98. Called pt to see if he is on a new meds. He denies any herbs or teas. No illness. No antibiotics. No nsaids. Not eating any excess of any vegetable supplements. Wt  was 77.5 kg and so now 1165 is max 1000 alternate 1250mg a day to get to dose that fits weight now.  Sweta was able to obtain your ultrasound and gave it to me, which was dated January 9, 2023, and it mentions that your liver had increased geographic echogenicity with macro and micronodular contour but no definite lesions. Liver showed no dilated bile ducts and common bile duct 3.4 mm. Gallbladder normal. Pancreas normal. Right kidney measured 9.5 cm with normal echogenicity and no hydronephrosis and simple right renal cyst measuring 6.5 x 4 cm and was previously 6.4 x 6 cm.  This was better evaluated on the more recent MRI.  Nonshadowing echogenic focus seen in the mid renal pole measuring 1.4 x 1.1 cm and felt likely corresponding to renal sinus fat. Left kidney measure 11.4 cm with normal echogenicity. Spleen was normal at 12 cm. Liver vessels were patent. Given this you need to repeat the scan in 6 months which would be then in the July timeframe. We will put in the order for this to be done in July for you.   April 7 labs show sugar still elevated at 119 but down from 131 December.  Please share with primary provider.  BUN is 17 creatinine 0.77 which is actually better than in December when it was 0.89.  Both however are normal range. Sodium 141 potassium 4.8 and chloride 101 which is normal. Your calcium was up at 10.5.  Are you taking a calcium and vitamin D supplement?  If you are that sometimes can cause this.  Please share with primary provider as this was normal before at 10.2 and prior to that 9.7. Asked pt and he is on vit d and regular vitamin. Asked him to check with primary re that as his calcium was high. Albumin normal at 4.1. Bilirubin slightly higher this time at 1.6 and previously 1.1.  Alkaline phosphatase was higher at 452 from 423 and AST was higher at 87 and ALT of 88 from prior levels. Asked pt if he was ill and says has been a bit stressed as son ill.  March 7 labs show albumin stable at 4.5 and actually rising which is good to see.  Bilirubin down to 1.1 from 1.3 with a direct 0.68.  Alk phos slightly higher at 470 from 427 AST and ALT slightly higher at 93 and 98 from previous AST of 84 and ALT of 82. Not really see in the labs drop yet and still slightly going up.  Again just confirming no new meds or herbal remedies?  Please let us know.  February 6 labs show albumin 4.4 which is normal and in fact a little higher for you.  Total bilirubin still 1.3 as before on January 9.  Direct bilirubin 0.68.  Alk phos went up a little to 427 from 400 and AST 84 and ALT 82 with previous AST 63 and ALT 69.  Egd from June 9 from Dr Vann. Told not due yet. Gastric body and gastric antrum with congestion, erosions, and erythema. Duodenum was normal. Small hiatal hernia present. LA grade B esophagitis seen in esophagus. 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 They mentioned to redo in 3-5 yrs? They ordered pantoprazole 40mg po qd.  December 21 2022 labs show INR normal at 1.0 which is good to see.  Is actually lower than back in September when it was 1.1. Sugar was elevated at 131 and as we mentioned before that may be related to you doing the labs not fasting.  Please share with primary providers. BUN of 17 creatinine 0.89 sodium 141 potassium 4.8 calcium 10.2 albumin 4.4 bilirubin 1.1 alkaline phosphatase went up a little to 423 from 323.  Any recent issues?  AST was 72 ALT of 70 up from 57 and 49. White blood cell count 4.1 hemoglobin 14.6 platelet count 151 MCV 98 neutrophils 2.2 lymphocytes 1.3. Called pt: took a medicine for his knees: meloxicam he took and has 8% risk to raise the labs. So that is what did that.  He did stop that.   September 19 labs show white blood cell count 3.6 hemoglobin 14.9 platelet count 145 down from 164.  Normal is from 150 up to 450.  1 year ago and September 3, 2021 the platelet count was about the same at 144. Neutrophils 1.6 lymphocytes 1.4 both normal.  Sugar was slightly up at 148 from previous 137.  BUN of 12 Cr 0.68.  Sodium 143 potassium 4.2 albumin 4.2 bilirubin normal at 0.6 and down from 0.9. Alkaline phosphatase down from 404 to 323.  AST slightly lower at 57 from 58.  ALT down to 49 from 58.  So they are moving in the right direction. INR normal at 1.1. Meld 7 and meld na 7 so remains low.  June 20 labs showed sugar elevated at 138.  BUN of 14 creatinine 0.79 sodium 140 potassium 4.6 chloride 102 CO2 27. Albumin 4.0 bilirubin 0.8 down from 1.1.  Urine bilirubin 0.38 down from 0.43.  Alkaline phosphatase 319 from 298 so it went up slightly.  AST went down to 45 and ALT to 46 from previous AST 60 and ALT 60. Overall the liver labs and the AST and ALT are lower and the alk phos slightly higher.  July 9 MRI sent in to me. Lower thorax shows minimal bibasilar atelectasis. Liver showed no significant fat or iron but does have chronic liver disease changes seen including lobar redistribution and nodular contour.  There are some reticular enhancement changes that are compatible with fibrosis.  No suspicious liver lesions seen.   Liver vessels are patent as expected. Small varices seen near the stomach and spleen.  Important to stay on top of egd surveillance for these issues. Recannulized periumbilical vein noted which is another sign of portal hypertension Spleen slightly enlarged at 13 cm. Pancreas and adrenal glands normal. Kidneys show some renal cysts and no further description was given. They did see your appendix and it appeared normal to them. They also saw some mild atherosclerotic disease of the abdominal aorta but without aneurysm.  Please share with primary providers to be aware of same. Degenerative changes seen of your spine. We may be able to look towards doing an ultrasound alternating with an MRI and we will discuss this further at your next visit.   March 15 labs show glucose elevated at 137 previously 143 and please share with primary provider.  BUN of 14 creatinine 0.86 sodium 139 potassium 4.8 calcium 10.0 albumin 4.2 bilirubin 0.9.  These other labs are normal range. Alkaline phosphatase did go up to 404 from previous 394 and prior 327.  AST came down from 63 to 58.  ALT came down from 68 to 58. Hill cell count 4.1 hemoglobin 14.9 platelet count 164 normal.  MCV 89. Neutrophils 1.4 and lymphocytes 1.9.  Ocaliva was on it had toxicity risk and not much lab benefit and being cirrhotic is higher risk and he is  getting older.  January 29 2022 MRI Lower thorax showed bibasilar atelectasis which means that the bases of the lungs were not fully expanded.  So metimes we can see that when you are in the MRI machine in that fixed position for a while.  Please share with primary provider. Liver showed no fat or iron but did have chronic liver disease changes including a nodular contour and lobar redistribution.  Some fibrosis changes seen.  No suspicious lesions.  Small left lobe hepatic cyst seen. Gallbladder normal with some unchanged mild intrahepatic bile duct dilation most pronounced in the periphery. Spleen was top normal in size at 13 cm. Pancreas, adrenal glands, and lymph nodes were normal. Stable renal cyst seen bilaterally. Mild atherosclerosis of the aorta seen without aneurysm.  Small varices near the esophagus and stomach so would need to stay on top of that EGD surveillance. Mild degenerative changes seen of the spine.  He did the covid 19 series and March 5 2021. He did the covid booster.  Meds off ocaliva for summer 2020.   November 30 labs show INR normal at 1.0 which is good to see.  Glucose currently 154 elevated and previously was 143 and prior to that 129.  All 3 were elevated.  We have discussed this previously.  Please share with primary providers per their review. BUN of 13 creatinine 0.84 sodium 142 potassium 4.8 chloride 102 CO2 of 27 calcium elevated at 10.3. Asked if he is on any calcium supplements?  he said not taking any that he knows. Previously was normal at 10.2 and prior 10.1.  Please share with primary provider also.  Albumin 4.5 bilirubin 1.0 which is normal.  Alkaline phosphatase elevated at 394 previously 327 and prior 309.  AST 63 and ALT 68 which appeared to be slightly higher from prior AST of 47 ALT 45. Asked if he had any new meds and he says he is a vit d supplement. White blood cell count 4 hemoglobin 14.8 platelet count slightly low at 146 but improved from last time at 144.  MCV normal at 90.  Neutrophils 1.5 and lymphocytes 1.7. Meld low at 6 and meld na 6.    September 3 labs show INR remains perfectly normal at 1.0 and was previously 1.1. Glucose still remains elevated at 143 previously 129 and share with primary provider.  BUN of 13 creatinine 0.84 sodium 140 potassium 4.5 calcium 10.2 albumin 4.1 bilirubin 1.0.  Previously bilirubin 0.7 but remains normal again at 1.0.  Alkaline phosphatase slightly up at 327 from 309 previously 355.  AST down to 47 from 55 from prior 63.  ALT 45 down from 51 and prior 59 so those continue to drop.  White blood cell count 4.3 hemoglobin 14.9 platelet count 144 which is slightly lower from 162.  MCV 90.  Neutrophils normal at 1.4. Meld remains low at 6 and meld na 6.  July 10 MRI shows the lower thorax to have bibasilar atelectasis. Liver shows morphologic changes of chronic liver disease with nodular contour and lobar redistribution.  They do see changes in the liver parenchyma that are suggestive of fibrosis.  More confluent fibrosis seen in the right lobe. No definite suspicious liver lesions seen.  You do have some perfusion anomalies which need to be rechecked in 6 months.  Scattered hepatic cysts are seen. Gallbladder was unremarkable and mild intrahepatic bile duct dilation was seen also more conspicuous in the right lobe versus the left.  This is felt to be compatible with your PBC diagnosis.  No extrahepatic bile duct dilation seen. Spleen top normal at 12.9 cm with small splenule in the left upper quadrant. Pancreas normal. Renal cysts were seen. Colon diverticulosis was noted as well. No inflammation however was seen in the colon. Mild paraesophageal perigastric and perisplenic varices were seen and moderate aortobiiliac atherosclerosis seen. Overall they felt you had signs of chronic liver disease but no evidence of any malignancy.   June 9 laboratories show white blood cell count 4.3 hemoglobin 13.8 platelet 162.  These are stable for you.  Platelet count is actually better than it was before at 151.  Neutrophils are stable at 1.6 and previously were 1.5.  Total bilirubin is down to 0.7 from 0.8.  Alkaline phosphatase is lower at 309 from 355.  AST down to 55 from 63 ALT 51 down from 59 and prior to that 68.  So the liver labs continue to be dropping.  Sodium 139 potassium 4.6 chloride 102 CO2 26 BUN 17 creatinine 0.93 glucose 129.  Sugar is actually lower than the previous time at 146. INR 1.1.   Meld score low at 7 and meld na 7.  April 27: alb 4.3 and tb 1.1 and db 0.43 and alk 298 and ast 60 and alt 60.  March 2: alk 355 and ast 63 and alt 59 so in fact alk phos lower now to 298 and ast and alt about the same.  8 weeks off labs March 2 and inr 1.0 and tb 0.8 and alk 355 and down from 376 and prior 392. ast 63 and alt 59 and down from 65 and 68 so little lower.  ca 10.0. na 140 and k 5.0 and glu 146 elevated. bun 18 and cr 0.9. wbc 3.9 and hg 14.9 and plat 151 and mcv 90.   4 weeks off labs: jan 25 2021 and inr normal 1.0 and so little lower now. tb 0.8 and lower from 0.9 and alk 376 from 392 so lower and ast 65 and alt 68 and prior ast 60 and alt 68 so not much of a change seen. na 143 and k 4.6 and cl 103 and co2 23 and cr 0.93. glu 128 elevated and mentioned to him. defer to local md re your sugars. cr 0.93. wbc 4.1 hg 14.5 and plat 138 (down from 165) and mcv 89.  Jan 23 mri: Morphologic changes of chronic liver disease without  hepatocellular carcinoma seen so that is good to note. Patent portal venous system with stigmata portal hypertension, including upper abdominal varices and splenomegaly noted so need to stay on top of egd surveillance. Right renal cyst with area of complexity on prior ultrasound demonstrates no septation, enhancement, or nodularity on this examination, compatible  with a simple cyst ( 4.7 x 6.2 x 7.8 cm.) This may have represented artifact on prior ultrasound. Continued attention on follow-up for liver disease was recommended. Liver showed no fat or iron. Scattered simple cysts in the left hepatic  lobe. Periesophageal, perigastric, perisplenic varices seen. Mild intrahepatic duct dilatation peripherally extending to the capsule, greater in the right hepatic lobe, compatible with primary biliary cirrhosis. No extrahepatic biliary ductal dilatation. Normal gallbladder. Spleen was enlarged measuring 13.1 cm in the craniocaudal dimension.Adjacent splenule. Pancreas normal. Mild atherosclerotic disease abdominal aorta without aneurysm. Mild degenerative changes of the spine.  Document Type: MRI Abdomen w/ + w/o Contrast Document Date: January 23, 2021 09:35 Document Status: Auth (Verified) Document Title: MRI Abdomen w/ + w/o Contrast Performed By: Jason Kapadai Verified By: Jason Kapadia on January 25, 2021 07:35 Encounter info: [id number], Formerly [facility], Single Visit OP, 1/23/2021 - 1/23/2021 * Final Report * Reason For Exam PBC//HEPATIC FIBROSIS//DIABETES//FATTY LIVER//RENAL CYST//ELEVATED ALKALINE PHOSPHATASE LEVEL REPORT EXAM: MRI Abdomen w/ + w/o Contrast CLINICAL INDICATION: PBC//HEPATIC FIBROSIS//DIABETES//FATTY LIVER//RENAL CYST//ELEVATED ALKALINE PHOSPHATASE LEVEL. TECHNIQUE: Multisequence, multiplanar MRI of the abdomen was performed without and with intravenous contrast. ESRC.2.7.3 CONTRAST: 16 cc of Prohance COMPARISON: Outside MRI dated 9/1/2020. Abdominal ultrasound dated 8/10/2020. FINDINGS: Lower Thorax: Normal. Liver: No fat or iron. Morphologic changes of chronic liver disease, including lobar redistribution and nodular contour. Delayed reticular enhancement of the hepatic parenchyma, compatible with fibrosis. More confluent fibrosis in the right hepatic lobe. Scattered simple cysts in the left hepatic lobe. No hepatocellular carcinoma. Hepatic vasculature is patent. Recannulized paraumbilical vein. Periesophageal, perigastric, perisplenic varices. Gallbladder/Biliary Tree: Mild intrahepatic duct dilatation peripherally extending to the capsule, greater in the right hepatic lobe, compatible primary biliary cirrhosis. No extrahepatic biliary ductal dilatation. Normal gallbladder. Spleen: Enlarged measuring 13.1 cm in the craniocaudal dimension. Adjacent splenule. Pancreas: Normal. Adrenal Glands: Normal. Kidneys/Ureters: Renal cysts. Right renal cyst with area of complexity on prior ultrasound demonstrates no septation, enhancement, or nodularity on this examination and measures 4.7 x 6.2 x 7.8 cm. Gastrointestinal: No bowel obstruction. Lymph Nodes: Normal. Vessels: Mild atherosclerotic disease abdominal aorta without aneurysm. Peritoneum/Retroperitoneum: Normal. Bones/Soft Tissues: Mild degenerative changes of the spine. IMPRESSION: 1. Morphologic changes of chronic liver disease without hepatocellular carcinoma. 2. Patent portal venous system with stigmata portal hypertension, including upper abdominal varices and splenomegaly. 3. Right renal cyst with area of complexity on prior ultrasound demonstrates no septation, enhancement, or nodularity on this examination, compatible with a simple cyst. This may have represented artifact on prior ultrasound. Continued attention on follow-up for liver disease. Signature Line *** Final ***  Electronically Signed By: Jason Kapadia on 01/25/2021 07:35 Dictated by: Jason Kapadia  2 weeks off ocaliva: Sanju 15: glu 117 elevated some but similar to nov 117 but down from 204 in dec 30. bun 16 and cr 0.91. na 141 and k 4.7 and cl 102 and ca 10.1 and alb 4.2 and tb 0.9 down from 1.1 in nov. ast 60 and alt 68 and so about the same as you can see vs other two labs and alk 392  about the same vs dec but little up from nov 354. wbc 4.1 hg 14.8 plat 165. inr 1.1 stable. So no dramatic changes seen yet that point.  Dec 30 2020 labs in middle: wbc 3.9 hg 15.1 plat 167. mcv 97. tb 0.9 down  from 1.1 and alk 399 slightly higher from 354. ast 64 and alt 66 and prior ast 63 and alt 60. na 139 and k 4.7 and cl 99 and co2 27. glu 204 elevated so that is newer issue as prior 117 and 134 so show to local md. bun 15 and cr 1.07 little up and prior 0.85 so little dry and could be linked to the sugars.  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  He is on urosodiol 500mg BID and had been on for years Ocaliva 10mg q other day due to pruritis and then off due to pruritis.  He weighs 183. Max dose 1100 to 1250 range 13-15mg/kg and we can ursodiol to 1.5 in the am and 1 in the evening. that may help and will avoid something more risky.  Itching much better on the gabapentin and off the ocaliva.  Nov 25 2020: wbc 4.2 hg 15 and plat 150 and mcv 90. Neutrophils 1.3 and prior 1.4 and lymphs 2.0 and prior 1.9. tb 1.1 and alk 354 and ast 63 and alt  60 and prior tb 0.9 and alk 343 and ast 55 and alt 61. So about the same. glu 117 and down from 134. bun 15 and cr 0.85 and na 139 and k 4.4 and cl 101 and co2 25. alb 4.2 normal.  Saw local mri: 9-1 20: nonenhancing renal cyst and no definite renal mass. Largest cyst right kidney 5.7cm. Not surprisingly they thought liver appeared to be cirrhotic. Also apparent nonenhancing cysts in the liver up to 10.6mm. Nonspecific biliary dilation in liver. Extrahepatic duct appears decompressed. Nonspecific splenomegaly seen and no significant varices seen. Left adrenal suspected adenoma. Appendix seemed somewhat prominent to then at 6.4mm but was probably similar to prior per them. No definite gallstones. Prior [place] imaging liver coarsened. They recommended mri to better see possible complex renal cyst.  Saw urologist re the kidney issue was stable. They were to see him again in feb 2021 and had been changed.  The patient relates no significant family or personal history of liver disease. He states no history of new medications or alcohol use. The  patient reports a personal history of no other habits that could cause liver damage.  July 2020: tsh very low and show local md. T4 normal 8.5. inr 1.1 and tb 0.9 and alk 343 and ast 55 and alt 61 and tb 0.9 and alb 4.2 and ca 10.6 elevated and show local md also. na 144 and k 4.8. glu 134 elevated and maybe not fasting. cr 0.83. wbc 4.1 hg 15.3 plat 170.  Prior April ast 67 and alt 66 and alk 362 and tb 0.9 so liver labs little lower this last time despite the low dose and alk little lower also. calcium prior 10.1 and is not on any calcium supplements.  He says local doctor following thyroid and says he is doing better.  4- wbc 4.0 and hg 14.3 and plat 155 and neutrophils 1.2 little low. glu 130 and cr 0.78 and na 140 and k 4.4 and cl 101 and co2 22 and alb 4.3 and tb 0.9 and alk 362 and ast 67 and alt 66 and inr 1.1.  2-17-20 and wbc 4.1 hg 14.5 plat 153 and neutrophils 1.3 and glu 143 and cr 0.96 and na 140 and k 4.3 and cl 100 and co2 25 and alb 4.0 and tb 0.8 and alk 352 and ast 66 and alt 61 and inr 1.0.  12-16-19 and wbc 3.6 and hg 14.3 and plat 184 and neutrophils 1.3 and glu 124 and cr 0.86 and na 142 and k 5.0 and cl 102 and cl2 25 and alb 4.3 and tb 0.9 and alk 353 and ast 70 and alt 67 and inr 1.0.  Document Type: US Abdomen Doppler Complete Document Date: August 10, 2020 10:04 Document Status: Auth (Verified) Document Title: US Abdomen Doppler Complete Performed By: Jack Martin Verified By: Candelaria Rodriguez on August 10, 2020 11:41 Encounter info: [id number], Formerly [facility], Single Visit OP, 8/10/2020 - * Final Report * Reason For Exam primary billary cholangitis REPORT EXAM: US Abdomen Doppler Complete, US Abdomen Complete CLINICAL INDICATION: Primary billary cholangitis. TECHNIQUE: Grayscale, pulsed wave and color Doppler sonography of the upper abdomen were performed. ESRC.3.7.1 COMPARISON: None FINDINGS: Liver: Coarsened echotexture. No lesions. Bile Ducts: No dilated intrahepatic biliary radicles. Common duct measures 4 mm. Gallbladder: Normal. Wick's sign is negative. Pancreas: Partially obscured by overlying structures. Right Kidney: Measures 11.4 cm. Normal echogenicity. No hydronephrosis. 6.4 x 5.9 x 5.3 cm inferior pole cystic lesion with internal thickened septation versus areas of nodularity, indeterminate. Hyperechoic nonshadowing cortical focus measuring 0.5 x 0.4 x 0.2 cm.. Nonshadowing hyperechoic focus near the corticomedullary junction measuring 1.1 x 0.8 x 0.6 cm. Left Kidney: Measures 10.6 cm. Normal echogenicity. No hydronephrosis. Spleen: Measures 12.2 cm. Aorta: Normal caliber where imaged. IVC: Normal proximally, not imaged distally. Hepatic Veins: Normal. Portal Veins: Hepatopetal flow. Velocity of 27 cm/s in the main portal vein, 25 cm/s in the right portal vein, and 20 cm/s in the left portal vein. Hepatic Arteries: Peak systolic velocity 115 cm/s. Resistive index 0.77. Other: None. IMPRESSION: 1. Complex cystic lesion within the right kidney with thickened septation/areas of nodularity. Recommend MRI for further evaluation to exclude enhancing/solid components. Nonshadowing echogenic foci in the right kidney may represent nonobstructing stones although underlying lesions cannot be excluded. This can be evaluated at the time of MRI. 2. Coarsened hepatic echotexture can be seen with hepatic steatosis or chronic liver disease. No intra or extrahepatic biliary ductal dilation. Patent hepatic vasculature. The images were reviewed and interpreted by Candelaria Rodriguez MD. Signature Line *** Final *** Electronically Signed By: Candelaria Rodriguez on 08/10/2020 11:41 Dictated by: Jack Martin  12- u/s coarse hepatic ehcotexure and consistent with cirrhosis and 1.2cm hepatic cyst. Patent vessels and right renal cysts up to 5.7cm and some nonobstructing right renal calculi. Spleen 12.3cm.  Oct 14 2019 wbc 3.8 and hg 14.4 plat 177 and neutrophils 1.3 little low and glu 123 and cr 0.74 and na 141 and k 4.5 and cl 100 and co2 25 and alb 4.3 and tb 0.9 and alk 336 ast 67 and alt 61. hep b immune 40.5  Sept 18 2019 na 140 and k 4.7 and cl 103 and glu 121 and bun 12 and cr 0.79 alb 3,8 and tb 0,9 and ca 9.6 and ast 49 and alt 48 and alk 334 and a1c 5.7 and chol 228 and trg 52 and hdl 86 and ldl 132 nd psa 0.01 and wbc 4.2 and hg 144 and plat 183.  June 19 2019 and liver midly coarse and 1cm hepatic cyst and stable and gb not distended and no stones and bile ducts not dilated and spleen stable 12.3cm abd right kdiney 11cm and several right kidney cysts up to 6.2cm. Saw stone sin the right kidneuy. No hydronephrosios. Vessels patent. No change vs 2018.  Dec 2018 u.s with liver appearing fatty and patent vessels. Right kidney cyst 6.1x4.7x5.3cm stable abd simple. Liver cyst 1.1x0.8x1.1.cm. Similar to prior scan.  April 2019 labs wbc 4.3 hg 14.7 plat 183 and glu 134 and cr 0.85 and na 141 k 5.1 and tb 1.0 and alk 446 and ast 85 and alt 91.  Feb 2019 alk 418 and ast 79 and alt 81.  Dec 2018 alk 440 and tb 0.9 and db 0.48 and ast 76 and alt 92. Liver 76% and bone 20% and intestine 4%.  11/26/2018: wbc 4.6, hgb 14.7, plts 175,000, gluc 130, vet 0.89, na 142, k 4.0, alb 4.4, frances 2.9, t.bili 1.0, , AST 75, ASLT 80,INR 1.0, TSH 2.26  9/04/2018: wbc 4.6, hgb 14.4 plts 166,000, gluc 130, creat 0.95, na 143, k 4.5, alb 4.3, frances 3.1, t.bili 0.8, , AST56, ALT 62, INR 1.0, TSH 2.17,  6/05/2018: HBsAb 4.5, HBsAg neg, HBcAb neg, HAV pos  6/20/2018: liver echogenic suggesting mild fatty infiltration, simple cyst 1.1 x 0.7 x 1.3,, gallbladder unremarkable, mpv patent, cbd 4mm, right kidney simple cyst 5.6 x 4.8 x 5.2 cm, echoegenic focus 1.3cm consider kidney stone.  His cholesterol is checked by Dr. Sachin Dimas. He says cholesterol has been ok.   Reminded pt re bone density and needs to be following locally and he has not done this and reminded to get with local provider.  11-13-19: spine normal and t score 0.1 and z score 0.1/ femur neck -0.9 and -0.4 and femur total -1.0 and -0.9. He shared with primary md. he will check with primary md to redo as been 2 yrs.  Plan: 1. Pt waiting for vein clearance for surgery of the knee., 2. Pt aware that new meds coming for pbc and so we can see if optiosn that are ok for him to try as ocaliva not tolerated. 3. Pt remains on Ursodiol and waiting for the new meds for pbc to come out. 4. Pt will do u.s in Sept to see how doing.   Duration of the visit was 31 minutes with 10 minutes of chart prep and 21 minutes by dox video with time spent reviewing historical and recent records, discussing theircurrent status relative to same and reviewing future plans for the patient.

## 2024-08-19 ENCOUNTER — TELEPHONE ENCOUNTER (OUTPATIENT)
Dept: URBAN - METROPOLITAN AREA CLINIC 91 | Facility: CLINIC | Age: 84
End: 2024-08-19

## 2024-09-06 ENCOUNTER — TELEPHONE ENCOUNTER (OUTPATIENT)
Dept: URBAN - METROPOLITAN AREA CLINIC 86 | Facility: CLINIC | Age: 84
End: 2024-09-06

## 2024-09-06 NOTE — HPI-TODAY'S VISIT:
Deayue Trejo, Sept 5 u.s: states visualization of the liver mildly limited due to its high position under the costal margin. No hepatic abnormality seen. Liver slightly nodular surface contour and compatible with hx of cirrhosis. Liver was mildly heterogeneous and small 1cm cyst at the left lobe.  Small ascites seen near the liver. Important to stay low salt diet. Pancreas normal where seen. Gallbladder no obvious gallstone or polyp. Common duct not seen due to gas. Rigth kidney 7mm probable calcification at the mid kidney and peripheral 7x3.5cm simple cyst and felt similar to prior. Adjacent simple cyst 1.2cm simple cyst. No hydronephrosis. Left kidney 1.6cm cyst seen mid kidney and faintly delineated and 1cm parapelvic cyst. 6mm parapelvic cyst. No hydroneprhsois. Spleen mildly enalrged 13.1cm and similar to prior. Vessels patent but gas limtied exam of the right and left hepatic veins, Aorta moderate atheroslcerotic changes and felt unchanged and important to watch cholesterol lipids.  Overall cirrhotic liver and 1cm cyst seen and no other issues, Small ascites seen and need to stay low salt diet as salt can lead to more fluid formation. Mild splenomegaly and bilateral renal cysts and one renal cyst was not as well seen. Dr Longoria

## 2024-09-09 ENCOUNTER — TELEPHONE ENCOUNTER (OUTPATIENT)
Dept: URBAN - METROPOLITAN AREA CLINIC 86 | Facility: CLINIC | Age: 84
End: 2024-09-09

## 2024-09-09 RX ORDER — URSODIOL 500 MG/1
TAKE 1.5 IN AM AND 1 IN PM ALTERNATING 1 PO TWICE A DAY TABLET ORAL
Qty: 202.5 TABLETS | Refills: 0

## 2024-09-25 ENCOUNTER — TELEPHONE ENCOUNTER (OUTPATIENT)
Dept: URBAN - METROPOLITAN AREA CLINIC 86 | Facility: CLINIC | Age: 84
End: 2024-09-25

## 2024-09-25 LAB
ALBUMIN: 3.8
ALKALINE PHOSPHATASE: 373
ALT (SGPT): 74
AST (SGOT): 89
BASO (ABSOLUTE): 0
BASOS: 1
BILIRUBIN, TOTAL: 2.2
BUN/CREATININE RATIO: 19
BUN: 15
CALCIUM: 9.4
CARBON DIOXIDE, TOTAL: 24
CHLORIDE: 103
CREATININE: 0.79
EGFR: 88
EOS (ABSOLUTE): 0.2
EOS: 5
GLOBULIN, TOTAL: 3
GLUCOSE: 112
HEMATOCRIT: 43.4
HEMATOLOGY COMMENTS:: (no result)
HEMOGLOBIN: 13.9
IMMATURE CELLS: (no result)
IMMATURE GRANS (ABS): 0
IMMATURE GRANULOCYTES: 0
INR: 1.1
LYMPHS (ABSOLUTE): 1.4
LYMPHS: 44
MCH: 30.7
MCHC: 32
MCV: 96
MONOCYTES(ABSOLUTE): 0.4
MONOCYTES: 11
NEUTROPHILS (ABSOLUTE): 1.2
NEUTROPHILS: 39
NRBC: (no result)
PLATELETS: 117
POTASSIUM: 4.7
PROTEIN, TOTAL: 6.8
PROTHROMBIN TIME: 12.1
RBC: 4.53
RDW: 13.2
SODIUM: 140
WBC: 3.2

## 2024-09-25 NOTE — HPI-TODAY'S VISIT:
Dear Roel Trejo, September 24, 2024 labs show INR normal at 1.1 and prothrombin time elevated at 12.1. Glucose was elevated 112 and previously had been 108.  Please share with local providers. BUN of 15 creatinine 0.79 sodium 140 potassium 0.7 calcium 9.4 albumin 3.8. Bilirubin slightly higher at 2.2 and alk phos slightly lower at 373 and AST 89 and ALT of 74 previously AST 84 ALT of 71.  These are similar. WBC slightly low at 3.2 and was normal before.  This can vary sometimes. Hemoglobin 13.9 and platelet count was a little higher at 117.  MCV was 96. Neutrophils were a little low at 1.2 but up from 1.1.  Lymphocytes were normal at 1.4. Again please share labs with primary provider.   Dr. Longoria

## 2024-10-11 ENCOUNTER — OFFICE VISIT (OUTPATIENT)
Dept: URBAN - METROPOLITAN AREA TELEHEALTH 2 | Facility: TELEHEALTH | Age: 84
End: 2024-10-11
Payer: MEDICARE

## 2024-10-11 VITALS — HEIGHT: 65 IN | WEIGHT: 171 LBS | BODY MASS INDEX: 28.49 KG/M2

## 2024-10-11 DIAGNOSIS — K76.0 FATTY LIVER: ICD-10-CM

## 2024-10-11 DIAGNOSIS — K76.6 PORTAL HYPERTENSION: ICD-10-CM

## 2024-10-11 DIAGNOSIS — N20.0 RENAL STONE: ICD-10-CM

## 2024-10-11 DIAGNOSIS — R74.8 ELEVATED ALKALINE PHOSPHATASE LEVEL: ICD-10-CM

## 2024-10-11 DIAGNOSIS — Z79.899 HIGH RISK MEDICATION USE: ICD-10-CM

## 2024-10-11 DIAGNOSIS — R94.5 LIVER FUNCTION STUDY, ABNORMAL: ICD-10-CM

## 2024-10-11 DIAGNOSIS — K82.4 POLYP OF GALLBLADDER: ICD-10-CM

## 2024-10-11 DIAGNOSIS — K74.02 HEPATIC FIBROSIS, ADVANCED FIBROSIS: ICD-10-CM

## 2024-10-11 DIAGNOSIS — K74.3 PBC (PRIMARY BILIARY CIRRHOSIS): ICD-10-CM

## 2024-10-11 DIAGNOSIS — M19.90 ARTHRITIS: ICD-10-CM

## 2024-10-11 DIAGNOSIS — Z13.820 SCREENING FOR OSTEOPOROSIS: ICD-10-CM

## 2024-10-11 DIAGNOSIS — L29.9 PRURITIC CONDITION: ICD-10-CM

## 2024-10-11 DIAGNOSIS — N28.1 RENAL CYST: ICD-10-CM

## 2024-10-11 DIAGNOSIS — E66.3 OVERWEIGHT: ICD-10-CM

## 2024-10-11 DIAGNOSIS — K74.60 CIRRHOSIS OF LIVER WITHOUT ASCITES, UNSPECIFIED HEPATIC CIRRHOSIS TYPE: ICD-10-CM

## 2024-10-11 DIAGNOSIS — E11.9 DIABETES: ICD-10-CM

## 2024-10-11 PROCEDURE — 99214 OFFICE O/P EST MOD 30 MIN: CPT

## 2024-10-11 RX ORDER — URSODIOL 500 MG/1
TAKE 1.5 IN AM AND 1 IN PM ALTERNATING 1 PO TWICE A DAY TABLET ORAL
Qty: 202.5 TABLETS | Refills: 0

## 2024-10-11 RX ORDER — AMLODIPINE BESYLATE 5 MG/1
1 TABLET TABLET ORAL ONCE A DAY
Refills: 0 | Status: ACTIVE | COMMUNITY
Start: 1900-01-01

## 2024-10-11 RX ORDER — MIRABEGRON 25 MG/1
1 TABLET TABLET, FILM COATED, EXTENDED RELEASE ORAL ONCE A DAY
Status: ACTIVE | COMMUNITY

## 2024-10-11 RX ORDER — URSODIOL 500 MG/1
TAKE 1.5 IN AM AND 1 IN PM ALTERNATING 1 PO TWICE A DAY TABLET ORAL
Qty: 202.5 TABLETS | Refills: 0 | Status: ACTIVE | COMMUNITY

## 2024-10-11 RX ORDER — FAMOTIDINE 40 MG/1
1 TABLET AT BEDTIME TABLET, FILM COATED ORAL ONCE A DAY
Status: ACTIVE | COMMUNITY

## 2024-10-11 RX ORDER — ATENOLOL 25 MG/1
TAKE ONE TABLET BY MOUTH ONE TIME DAILY TABLET ORAL
Qty: 90 UNSPECIFIED | Refills: 0 | Status: ACTIVE | COMMUNITY

## 2024-10-11 RX ORDER — GABAPENTIN 300 MG/1
1 CAPSULE CAPSULE ORAL TWICE A DAY
Status: ACTIVE | COMMUNITY

## 2024-10-11 RX ORDER — OXYCODONE HYDROCHLORIDE 5 MG/1
1 TABLET AS NEEDED TABLET ORAL
Status: ACTIVE | COMMUNITY

## 2024-10-11 NOTE — HPI-TODAY'S VISIT:
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  He got a new [employer] phone.  September 24, 2024 labs show INR normal at 1.1 and prothrombin time elevated at 12.1. Glucose was elevated 112 and previously had been 108. Please share with local providers.  BUN of 15 creatinine 0.79 sodium 140 potassium 0.7 calcium 9.4 albumin 3.8.  Bilirubin slightly higher at 2.2 and alk phos slightly lower at 373 and AST 89 and ALT of 74 previously AST 84 ALT of 71. These are similar.  WBC slightly low at 3.2 and was normal before. This can vary sometimes.  Hemoglobin 13.9 and platelet count was a little higher at 117. MCV was 96.  Neutrophils were a little low at 1.2 but up from 1.1. Lymphocytes were normal at 1.4.   Sept 5 u.s: states visualization of the liver mildly limited due to its high position under the costal margin.  No hepatic abnormality seen.  Liver slightly nodular surface contour and compatible with hx of cirrhosis.  Liver was mildly heterogeneous and small 1cm cyst at the left lobe.  Small ascites seen near the liver. Important to stay low salt diet.  Pancreas normal where seen.  Gallbladder no obvious gallstone or polyp.  Common duct not seen due to gas.  Rigth kidney 7mm probable calcification at the mid kidney and peripheral 7x3.5cm simple cyst and felt similar to prior.  Adjacent simple cyst 1.2cm simple cyst. No hydronephrosis.  Left kidney 1.6cm cyst seen mid kidney and faintly delineated and 1cm parapelvic cyst. 6mm parapelvic cyst. No hydroneprhsois.  Spleen mildly enalrged 13.1cm and similar to prior.  Vessels patent but gas limtied exam of the right and left hepatic veins,  Aorta moderate atheroslcerotic changes and felt unchanged and important to watch cholesterol lipids.  Overall cirrhotic liver and 1cm cyst seen and no other issues, Small ascites seen and need to stay low salt diet as salt can lead to more fluid formation. Mild splenomegaly and bilateral renal cysts and one renal cyst was not as well seen.    July 15 labs show glucose down to 108 from 115 and prior 136.   BUN of 15 creatinine little bit higher at 0.96 from 0.8. Many people lately have been running a little bit higher due to the increase in heat on their hydration and kidney function. Working on your hydrational status with the heat may help with that to be back to usual. Please share with local providers. Sodium 141 potassium 4.6 calcium 9.4 albumin 3.8 and these are normal. Bilirubin remains a little lower at 1.9 from prior January 2.2. Alk phos a little lower at 405 from 461 and 483. AST lowered at 84 from 95 and prior 96 ALT down to 71 from 90 and prior 117. Is or anything different that you are doing that could be dropping this? Says he was taken off the vitamin d3 and stopped mvi.  WBC 3.6 hemoglobin 13.2 plate count 108 which is a little low but up from 106 back in April. MCV normal 94. Neutrophils remain a little low at 1.1 from 1.3 and lymphocytes normal at 1.8. INR normal at 1.1.  2 new drugs approved pbc and headed to a meeting to learn more and we will see. These have less itching that the ocaliva does. They have less of the itching induction.  Last imaging feb and says u.s was rebooked and rebooked sept at [facility]>  April 10 labs show glucose elevated at 115 but down from 136 in January. BUN of 15 creatinine 0.8 sodium 141 potassium 5.1 calcium 10.0 albumin 3.8 and these are stable for you. Bilirubin was down to 1.9 from 2.2 so that was good to see. Alk phos also lower at 461 AST slightly low at 95 and ALT slightly lower at 90. Need to keep following these labs for trends. Good to see them at least  not going up anymore like they did back in January. WBC 3.4 hemoglobin 14.2 platelet count 106 a little bit down from 127. Neutrophils and lymphocytes were checked with a neutrophils low at 1.3 and lymphocytes normal at 1.6.  DId the knee surgery was late sept 2024 and he did well. He is moving more and on therapy and that will help. 3- Wrote for knee surgery Vocal-Jose score using these labs: 30 day mortality: 0.9% 90 day mortality: 2.2% 180 day mortality: 4.1% 90 day decompensation 5.5%. Estimated Meld was 9.0 and meld na 9.0 as no recent INR.  [facility] from February 13 2024 was sent in to us. Unclear why we had not received it until now. Liver had a nodular surface contour and a heterogeneous appearance of the liver parenchyma.  No discrete mass was seen.  Portal vein appeared patent with appropriate directional flow. Pancreas was normal were seen. Gallbladder showed no evidence of inflammation or sludge.  Negative Wick sign.  Normal wall thickness was seen.  6 mm gallbladder wall echogenic focus was seen. Common bile duct showed no evidence of duct dilation. Right kidney showed no hydronephrosis was 10.6 cm.  You had a 5.8 cm simple appearing cyst and an 8 mm echogenic focus. Left kidney showed no hydronephrosis and was 11 cm with a 1.1 cm simple cyst seen.  Please share with local providers. Spleen was mildly enlarged at 13.1 cm. In regards to your aorta, you do have some signs of scattered atherosclerotic plaque.  Aorta measured up to 2.5 cm.  Important to share with your primary provider so that they can follow your cholesterol issues in regards to this. Liver vessels were patent. Splenic vessels were patent. In summary, they felt that you had a cirrhotic appearing liver without any definite liver lesions and mild splenomegaly.  They suspect that you may have a 6 mm possible gallbladder polyp versus adherent stone and a possible 8 mm right kidney nonobstructive calculus versus echogenic area. I went back and looked at your prior ultrasound from last year and it had suggested that the gallbladder polyp at that timeframe was 3 mm in size and said it was a possible polyp versus stone. We need to talk about considering doing a follow-up ultrasound in 3 months to check if the gallbladder echogenic focus continues to grow and if so, we need to talk with surgery about considering a gallbladder surgery for that if it indeed continues to grow.  May be due for egd with Dr Vann as done usually every 2 yrs.  June 9, 2022 EGD was sent in by Dr. Vann' office.  She actually called me on Friday late after office closed to get the fax number and I advised her to have them call Sweta today to get the monitored fax line for urgent faxes. I also asked Sweta to call her office  today, to give her the exact fax that could be monitored and that led to an almost immediate receipt of the report which I was very happy to get. In that report she states that your duodenum was normal but you did have diffuse moderate inflammation characterized by congestion/edema with erosions and erythema in the gastric body and gastric antrum and that she did biopsies with cold forceps for this. She is that you had a small hiatal hernia with some grade B esophagitis which means some irritation of the esophagus. 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 She recommended a repeat endoscopy in 3 to 5 years, but I would recommend given your clinical course, that it be instead considered to be repeated in 2 years instead because of the fact that you had grade 1 varices and we need to see if those are getting larger or not with your liver disease issues staying on higher labs.  We can discuss this at your upcoming visit and confer also with Dr. Vann considers about this. My main concern is that the liver labs are remaining up and so I just want to make sure that this is not getting any worse in the interim.   January 16 labs show sugar slightly up at 136 and unclear if you were fasting or not? BUN of 13 creatinine 0.81 sodium 141 potassium 4.7 chloride 102 and CO2 of 26 and these are normal. Your calcium was slightly up at 10.3 with normal being from 8.6 up to 10.2. Have you been taking any calcium or vitamin D supplements lately? Not on vitamin supplements but eating little more. Albumin was 4.2 normal. Bilirubin slightly higher at 2.2 from 1.6 and alk phos 483 from 442. AST up slightly to 96 and ALT also up to 107. Any recent illness or other issues that may have caused the labs to fluctuate up versus your prior labs? WBC 3.9 hemoglobin 14.5 and platelet count slightly lower at 127 from 132 before. MCV 90. Neutrophils were normal at 1.5 and lymphocytes 1.8 which would suggest less likely for you to have been ill recently.  No recent illnes. Says exercise is less due to weather.  10/16/23 telemed  Oct 10 labs show wbc 3.9 and hg 13.8 and platelets 132 little lower and prior normal 159. Mcv normal 90. Neutrophils 1.3 little low and lymphs 1.8 normal. Glucose 124 elevated and prior 124 also. Were you fasting this day? Bun 16 and cr 0.85 and stable.Na 140 and k 4.4 and cl 103 and co2 26. Ca 9.8 and alb 3.8 and tb 1.6 little lower from 1.8.Alk 442 little lower from 496. Ast 83 and alt 81 and both down from prior 108 ast and alt 98.  October 9, 2023. Also saw that i was listed as your ordering provider for this (I do not see that we have ordered this) and it states that you have no evidence of any abdominal aortic aneurysm.  August 30 labs show albumin stable at 4.1, bilirubin down to 1.8 from 2.0. Direct bilirubin 1.1. Alk phos 456 slightly up from 432 in May. AST 85 down from 105. ALT 82 down from 103 in May. Glad to see that that this is starting to look like it is coming down lets see what the trend is.  July 10 2023 and was reviewed by Dr. Hopkins per the note. And mentioned that you had osteopenia with your spine score being -0.7 the left hip -1.3 and the left femoral neck being -1.6. There is a suture fracture risk for the hip is 0.7% and major osteoporotic fracture risk is 2.5% over the next 10 years.  July 10 2023 ultrasound shows liver coarsened in its echotexture with blunting of the liver edges and mildly nodular contour.  No lesions seen. No dilated bile ducts seen and common bile duct 4.6 mm. Small gallbladder polyp seen measuring 3 x 3 x 3 mm.  Wick sign negative. Of note, prior u.s in Jan 2023 did not show this and so we need to follow this over time. Typically the concern is if this is a gallbladder polyp and not a stone is if it grows to 10 mm or more in size. Pancreas not seen due to overlying structures. Right kidney 10.4 cm with no hydronephrosis but with a simple right renal cyst measuring 6.4 x 6.7 x 3.9 cm that was previously 6.5 x 4.0 x 5.5 cm.  Punctate echogenic focus seen in the mid right kidney measuring 0.5 x 0.7 x 0.4 cm favored to be a stone. Left kidney 10.6 cm with no hydronephrosis. Spleen 13.2 cm. Liver vessels were patent which was good to see. In summary, they see chronic liver disease but without any sonographically evident lesions and patent vessels. They feel that this is a small gallbladder echogenic focus measuring 3 mm that they think is a pedunculated polyp versus less likely a stone. We will need to monitor this and look for any changes over time.  July 6 labs show lipase normal at 41, INR normal at 1.0, glucose was elevated at 124 down from 135. BUN of 15 creatinine normal 0.85 sodium 138 potassium 4.4 calcium 9.8 albumin 3.9. Bilirubin slightly higher at 1.8 from 1.7.  Alkaline phosphatase 496 up slightly from 491.   from 98 and ALT 98 from 94. WBC 3.9, hemoglobin 13.7 platelet count 159 MCV 92.  Neutrophils 1.6 and lymphocytes 1.5.  Sadly for these labs our only option would be to consider trying the Ocaliva again which had side effects for you versus continuing to wait for new or medicines for PBC to come out which may be next year or the following.  There is a medicine from [place] that they are trying to get approved.  That would be the next 1 to come out.  June 8 labs show sugar elevated at 135 and previously 119.  Have you been fasting when you do these labs?  There clearly are remaining elevated. BUN of 13 creatinine 0.756 sodium 141 potassium 4.6 chloride 106 CO2 of 25 albumin 3.9. Bilirubin slightly higher at 1.7 from 1.6.  Alk phos slightly higher at 491 from 452.  AST up to 98 from 87 and ALT up to 94 from 88.  Unclear to me if the sugars could also be making the liver labs be trending higher?  Have you done a hemoglobin A1c with primary provider to see what that shows? WBC 3.9 hemoglobin 13.6 platelet count slightly low at 147 MCV 92 and neutrophils 1.6 and lymphocytes 1.6. I think that looking at and clarifying the issue as to your need for getting the sugars to be optimal may be a reasonable issue to focus on for you and seeing what that does may help lower these. It is known that  sugars if not controlled can impact many other liver diseases.  May 10 labs show albumin 4.1, bilirubin elevated 2.0 and was 1.37 on the direct.  Alk phos was 452 slightly lower.  AST was slightly higher at 105 from 93 and ALT was 103 up from 98. Called pt to see if he is on a new meds. He denies any herbs or teas. No illness. No antibiotics. No nsaids. Not eating any excess of any vegetable supplements. Wt  was 77.5 kg and so now 1165 is max 1000 alternate 1250mg a day to get to dose that fits weight now.  Sweta was able to obtain your ultrasound and gave it to me, which was dated January 9, 2023, and it mentions that your liver had increased geographic echogenicity with macro and micronodular contour but no definite lesions. Liver showed no dilated bile ducts and common bile duct 3.4 mm. Gallbladder normal. Pancreas normal. Right kidney measured 9.5 cm with normal echogenicity and no hydronephrosis and simple right renal cyst measuring 6.5 x 4 cm and was previously 6.4 x 6 cm.  This was better evaluated on the more recent MRI.  Nonshadowing echogenic focus seen in the mid renal pole measuring 1.4 x 1.1 cm and felt likely corresponding to renal sinus fat. Left kidney measure 11.4 cm with normal echogenicity. Spleen was normal at 12 cm. Liver vessels were patent. Given this you need to repeat the scan in 6 months which would be then in the July timeframe. We will put in the order for this to be done in July for you.   April 7 labs show sugar still elevated at 119 but down from 131 December.  Please share with primary provider.  BUN is 17 creatinine 0.77 which is actually better than in December when it was 0.89.  Both however are normal range. Sodium 141 potassium 4.8 and chloride 101 which is normal. Your calcium was up at 10.5.  Are you taking a calcium and vitamin D supplement?  If you are that sometimes can cause this.  Please share with primary provider as this was normal before at 10.2 and prior to that 9.7. Asked pt and he is on vit d and regular vitamin. Asked him to check with primary re that as his calcium was high. Albumin normal at 4.1. Bilirubin slightly higher this time at 1.6 and previously 1.1.  Alkaline phosphatase was higher at 452 from 423 and AST was higher at 87 and ALT of 88 from prior levels. Asked pt if he was ill and says has been a bit stressed as son ill.  March 7 labs show albumin stable at 4.5 and actually rising which is good to see.  Bilirubin down to 1.1 from 1.3 with a direct 0.68.  Alk phos slightly higher at 470 from 427 AST and ALT slightly higher at 93 and 98 from previous AST of 84 and ALT of 82. Not really see in the labs drop yet and still slightly going up.  Again just confirming no new meds or herbal remedies?  Please let us know.  February 6 labs show albumin 4.4 which is normal and in fact a little higher for you.  Total bilirubin still 1.3 as before on January 9.  Direct bilirubin 0.68.  Alk phos went up a little to 427 from 400 and AST 84 and ALT 82 with previous AST 63 and ALT 69.  Egd from June 9 from Dr Vann. Told not due yet. Gastric body and gastric antrum with congestion, erosions, and erythema. Duodenum was normal. Small hiatal hernia present. LA grade B esophagitis seen in esophagus. 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 They mentioned to redo in 3-5 yrs? They ordered pantoprazole 40mg po qd.  December 21 2022 labs show INR normal at 1.0 which is good to see.  Is actually lower than back in September when it was 1.1. Sugar was elevated at 131 and as we mentioned before that may be related to you doing the labs not fasting.  Please share with primary providers. BUN of 17 creatinine 0.89 sodium 141 potassium 4.8 calcium 10.2 albumin 4.4 bilirubin 1.1 alkaline phosphatase went up a little to 423 from 323.  Any recent issues?  AST was 72 ALT of 70 up from 57 and 49. White blood cell count 4.1 hemoglobin 14.6 platelet count 151 MCV 98 neutrophils 2.2 lymphocytes 1.3. Called pt: took a medicine for his knees: meloxicam he took and has 8% risk to raise the labs. So that is what did that.  He did stop that.   September 19 labs show white blood cell count 3.6 hemoglobin 14.9 platelet count 145 down from 164.  Normal is from 150 up to 450.  1 year ago and September 3, 2021 the platelet count was about the same at 144. Neutrophils 1.6 lymphocytes 1.4 both normal.  Sugar was slightly up at 148 from previous 137.  BUN of 12 Cr 0.68.  Sodium 143 potassium 4.2 albumin 4.2 bilirubin normal at 0.6 and down from 0.9. Alkaline phosphatase down from 404 to 323.  AST slightly lower at 57 from 58.  ALT down to 49 from 58.  So they are moving in the right direction. INR normal at 1.1. Meld 7 and meld na 7 so remains low.  June 20 labs showed sugar elevated at 138.  BUN of 14 creatinine 0.79 sodium 140 potassium 4.6 chloride 102 CO2 27. Albumin 4.0 bilirubin 0.8 down from 1.1.  Urine bilirubin 0.38 down from 0.43.  Alkaline phosphatase 319 from 298 so it went up slightly.  AST went down to 45 and ALT to 46 from previous AST 60 and ALT 60. Overall the liver labs and the AST and ALT are lower and the alk phos slightly higher.  July 9 MRI sent in to me. Lower thorax shows minimal bibasilar atelectasis. Liver showed no significant fat or iron but does have chronic liver disease changes seen including lobar redistribution and nodular contour.  There are some reticular enhancement changes that are compatible with fibrosis.  No suspicious liver lesions seen.   Liver vessels are patent as expected. Small varices seen near the stomach and spleen.  Important to stay on top of egd surveillance for these issues. Recannulized periumbilical vein noted which is another sign of portal hypertension Spleen slightly enlarged at 13 cm. Pancreas and adrenal glands normal. Kidneys show some renal cysts and no further description was given. They did see your appendix and it appeared normal to them. They also saw some mild atherosclerotic disease of the abdominal aorta but without aneurysm.  Please share with primary providers to be aware of same. Degenerative changes seen of your spine. We may be able to look towards doing an ultrasound alternating with an MRI and we will discuss this further at your next visit.   March 15 labs show glucose elevated at 137 previously 143 and please share with primary provider.  BUN of 14 creatinine 0.86 sodium 139 potassium 4.8 calcium 10.0 albumin 4.2 bilirubin 0.9.  These other labs are normal range. Alkaline phosphatase did go up to 404 from previous 394 and prior 327.  AST came down from 63 to 58.  ALT came down from 68 to 58. Hill cell count 4.1 hemoglobin 14.9 platelet count 164 normal.  MCV 89. Neutrophils 1.4 and lymphocytes 1.9.  Ocaliva was on it had toxicity risk and not much lab benefit and being cirrhotic is higher risk and he is  getting older.  January 29 2022 MRI Lower thorax showed bibasilar atelectasis which means that the bases of the lungs were not fully expanded.  So metimes we can see that when you are in the MRI machine in that fixed position for a while.  Please share with primary provider. Liver showed no fat or iron but did have chronic liver disease changes including a nodular contour and lobar redistribution.  Some fibrosis changes seen.  No suspicious lesions.  Small left lobe hepatic cyst seen. Gallbladder normal with some unchanged mild intrahepatic bile duct dilation most pronounced in the periphery. Spleen was top normal in size at 13 cm. Pancreas, adrenal glands, and lymph nodes were normal. Stable renal cyst seen bilaterally. Mild atherosclerosis of the aorta seen without aneurysm.  Small varices near the esophagus and stomach so would need to stay on top of that EGD surveillance. Mild degenerative changes seen of the spine.  He did the covid 19 series and March 5 2021. He did the covid booster.  Meds off ocaliva for summer 2020.   November 30 labs show INR normal at 1.0 which is good to see.  Glucose currently 154 elevated and previously was 143 and prior to that 129.  All 3 were elevated.  We have discussed this previously.  Please share with primary providers per their review. BUN of 13 creatinine 0.84 sodium 142 potassium 4.8 chloride 102 CO2 of 27 calcium elevated at 10.3. Asked if he is on any calcium supplements?  he said not taking any that he knows. Previously was normal at 10.2 and prior 10.1.  Please share with primary provider also.  Albumin 4.5 bilirubin 1.0 which is normal.  Alkaline phosphatase elevated at 394 previously 327 and prior 309.  AST 63 and ALT 68 which appeared to be slightly higher from prior AST of 47 ALT 45. Asked if he had any new meds and he says he is a vit d supplement. White blood cell count 4 hemoglobin 14.8 platelet count slightly low at 146 but improved from last time at 144.  MCV normal at 90.  Neutrophils 1.5 and lymphocytes 1.7. Meld low at 6 and meld na 6.    September 3 labs show INR remains perfectly normal at 1.0 and was previously 1.1. Glucose still remains elevated at 143 previously 129 and share with primary provider.  BUN of 13 creatinine 0.84 sodium 140 potassium 4.5 calcium 10.2 albumin 4.1 bilirubin 1.0.  Previously bilirubin 0.7 but remains normal again at 1.0.  Alkaline phosphatase slightly up at 327 from 309 previously 355.  AST down to 47 from 55 from prior 63.  ALT 45 down from 51 and prior 59 so those continue to drop.  White blood cell count 4.3 hemoglobin 14.9 platelet count 144 which is slightly lower from 162.  MCV 90.  Neutrophils normal at 1.4. Meld remains low at 6 and meld na 6.  July 10 MRI shows the lower thorax to have bibasilar atelectasis. Liver shows morphologic changes of chronic liver disease with nodular contour and lobar redistribution.  They do see changes in the liver parenchyma that are suggestive of fibrosis.  More confluent fibrosis seen in the right lobe. No definite suspicious liver lesions seen.  You do have some perfusion anomalies which need to be rechecked in 6 months.  Scattered hepatic cysts are seen. Gallbladder was unremarkable and mild intrahepatic bile duct dilation was seen also more conspicuous in the right lobe versus the left.  This is felt to be compatible with your PBC diagnosis.  No extrahepatic bile duct dilation seen. Spleen top normal at 12.9 cm with small splenule in the left upper quadrant. Pancreas normal. Renal cysts were seen. Colon diverticulosis was noted as well. No inflammation however was seen in the colon. Mild paraesophageal perigastric and perisplenic varices were seen and moderate aortobiiliac atherosclerosis seen. Overall they felt you had signs of chronic liver disease but no evidence of any malignancy.   June 9 laboratories show white blood cell count 4.3 hemoglobin 13.8 platelet 162.  These are stable for you.  Platelet count is actually better than it was before at 151.  Neutrophils are stable at 1.6 and previously were 1.5.  Total bilirubin is down to 0.7 from 0.8.  Alkaline phosphatase is lower at 309 from 355.  AST down to 55 from 63 ALT 51 down from 59 and prior to that 68.  So the liver labs continue to be dropping.  Sodium 139 potassium 4.6 chloride 102 CO2 26 BUN 17 creatinine 0.93 glucose 129.  Sugar is actually lower than the previous time at 146. INR 1.1.   Meld score low at 7 and meld na 7.  April 27: alb 4.3 and tb 1.1 and db 0.43 and alk 298 and ast 60 and alt 60.  March 2: alk 355 and ast 63 and alt 59 so in fact alk phos lower now to 298 and ast and alt about the same.  8 weeks off labs March 2 and inr 1.0 and tb 0.8 and alk 355 and down from 376 and prior 392. ast 63 and alt 59 and down from 65 and 68 so little lower.  ca 10.0. na 140 and k 5.0 and glu 146 elevated. bun 18 and cr 0.9. wbc 3.9 and hg 14.9 and plat 151 and mcv 90.   4 weeks off labs: jan 25 2021 and inr normal 1.0 and so little lower now. tb 0.8 and lower from 0.9 and alk 376 from 392 so lower and ast 65 and alt 68 and prior ast 60 and alt 68 so not much of a change seen. na 143 and k 4.6 and cl 103 and co2 23 and cr 0.93. glu 128 elevated and mentioned to him. defer to local md re your sugars. cr 0.93. wbc 4.1 hg 14.5 and plat 138 (down from 165) and mcv 89.  Jan 23 mri: Morphologic changes of chronic liver disease without  hepatocellular carcinoma seen so that is good to note. Patent portal venous system with stigmata portal hypertension, including upper abdominal varices and splenomegaly noted so need to stay on top of egd surveillance. Right renal cyst with area of complexity on prior ultrasound demonstrates no septation, enhancement, or nodularity on this examination, compatible  with a simple cyst ( 4.7 x 6.2 x 7.8 cm.) This may have represented artifact on prior ultrasound. Continued attention on follow-up for liver disease was recommended. Liver showed no fat or iron. Scattered simple cysts in the left hepatic  lobe. Periesophageal, perigastric, perisplenic varices seen. Mild intrahepatic duct dilatation peripherally extending to the capsule, greater in the right hepatic lobe, compatible with primary biliary cirrhosis. No extrahepatic biliary ductal dilatation. Normal gallbladder. Spleen was enlarged measuring 13.1 cm in the craniocaudal dimension.Adjacent splenule. Pancreas normal. Mild atherosclerotic disease abdominal aorta without aneurysm. Mild degenerative changes of the spine.  Document Type: MRI Abdomen w/ + w/o Contrast Document Date: January 23, 2021 09:35 Document Status: Auth (Verified) Document Title: MRI Abdomen w/ + w/o Contrast Performed By: Jason Kapadia Verified By: Jason Kapadia on January 25, 2021 07:35 Encounter info: [id number], [facility], Single Visit OP, 1/23/2021 - 1/23/2021 * Final Report * Reason For Exam PBC//HEPATIC FIBROSIS//DIABETES//FATTY LIVER//RENAL CYST//ELEVATED ALKALINE PHOSPHATASE LEVEL REPORT EXAM: MRI Abdomen w/ + w/o Contrast CLINICAL INDICATION: PBC//HEPATIC FIBROSIS//DIABETES//FATTY LIVER//RENAL CYST//ELEVATED ALKALINE PHOSPHATASE LEVEL. TECHNIQUE: Multisequence, multiplanar MRI of the abdomen was performed without and with intravenous contrast. ESRC.2.7.3 CONTRAST: 16 cc of Prohance COMPARISON: Outside MRI dated 9/1/2020. Abdominal ultrasound dated 8/10/2020. FINDINGS: Lower Thorax: Normal. Liver: No fat or iron. Morphologic changes of chronic liver disease, including lobar redistribution and nodular contour. Delayed reticular enhancement of the hepatic parenchyma, compatible with fibrosis. More confluent fibrosis in the right hepatic lobe. Scattered simple cysts in the left hepatic lobe. No hepatocellular carcinoma. Hepatic vasculature is patent. Recannulized paraumbilical vein. Periesophageal, perigastric, perisplenic varices. Gallbladder/Biliary Tree: Mild intrahepatic duct dilatation peripherally extending to the capsule, greater in the right hepatic lobe, compatible primary biliary cirrhosis. No extrahepatic biliary ductal dilatation. Normal gallbladder. Spleen: Enlarged measuring 13.1 cm in the craniocaudal dimension. Adjacent splenule. Pancreas: Normal. Adrenal Glands: Normal. Kidneys/Ureters: Renal cysts. Right renal cyst with area of complexity on prior ultrasound demonstrates no septation, enhancement, or nodularity on this examination and measures 4.7 x 6.2 x 7.8 cm. Gastrointestinal: No bowel obstruction. Lymph Nodes: Normal. Vessels: Mild atherosclerotic disease abdominal aorta without aneurysm. Peritoneum/Retroperitoneum: Normal. Bones/Soft Tissues: Mild degenerative changes of the spine. IMPRESSION: 1. Morphologic changes of chronic liver disease without hepatocellular carcinoma. 2. Patent portal venous system with stigmata portal hypertension, including upper abdominal varices and splenomegaly. 3. Right renal cyst with area of complexity on prior ultrasound demonstrates no septation, enhancement, or nodularity on this examination, compatible with a simple cyst. This may have represented artifact on prior ultrasound. Continued attention on follow-up for liver disease. Signature Line *** Final ***  Electronically Signed By: Jason Kapadia on 01/25/2021 07:35 Dictated by: Jason Kapadia  2 weeks off ocaliva: Sanju 15: glu 117 elevated some but similar to nov 117 but down from 204 in dec 30. bun 16 and cr 0.91. na 141 and k 4.7 and cl 102 and ca 10.1 and alb 4.2 and tb 0.9 down from 1.1 in nov. ast 60 and alt 68 and so about the same as you can see vs other two labs and alk 392  about the same vs dec but little up from nov 354. wbc 4.1 hg 14.8 plat 165. inr 1.1 stable. So no dramatic changes seen yet that point.  Dec 30 2020 labs in middle: wbc 3.9 hg 15.1 plat 167. mcv 97. tb 0.9 down  from 1.1 and alk 399 slightly higher from 354. ast 64 and alt 66 and prior ast 63 and alt 60. na 139 and k 4.7 and cl 99 and co2 27. glu 204 elevated so that is newer issue as prior 117 and 134 so show to local md. bun 15 and cr 1.07 little up and prior 0.85 so little dry and could be linked to the sugars.  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  He is on urosodiol 500mg BID and had been on for years Ocaliva 10mg q other day due to pruritis and then off due to pruritis.  He weighs 183. Max dose 1100 to 1250 range 13-15mg/kg and we can ursodiol to 1.5 in the am and 1 in the evening. that may help and will avoid something more risky.  Itching much better on the gabapentin and off the ocaliva.  Nov 25 2020: wbc 4.2 hg 15 and plat 150 and mcv 90. Neutrophils 1.3 and prior 1.4 and lymphs 2.0 and prior 1.9. tb 1.1 and alk 354 and ast 63 and alt  60 and prior tb 0.9 and alk 343 and ast 55 and alt 61. So about the same. glu 117 and down from 134. bun 15 and cr 0.85 and na 139 and k 4.4 and cl 101 and co2 25. alb 4.2 normal.  Saw local mri: 9-1 20: nonenhancing renal cyst and no definite renal mass. Largest cyst right kidney 5.7cm. Not surprisingly they thought liver appeared to be cirrhotic. Also apparent nonenhancing cysts in the liver up to 10.6mm. Nonspecific biliary dilation in liver. Extrahepatic duct appears decompressed. Nonspecific splenomegaly seen and no significant varices seen. Left adrenal suspected adenoma. Appendix seemed somewhat prominent to then at 6.4mm but was probably similar to prior per them. No definite gallstones. Prior [facility] imaging liver coarsened. They recommended mri to better see possible complex renal cyst.  Saw urologist re the kidney issue was stable. They were to see him again in feb 2021 and had been changed.  The patient relates no significant family or personal history of liver disease. He states no history of new medications or alcohol use. The  patient reports a personal history of no other habits that could cause liver damage.  July 2020: tsh very low and show local md. T4 normal 8.5. inr 1.1 and tb 0.9 and alk 343 and ast 55 and alt 61 and tb 0.9 and alb 4.2 and ca 10.6 elevated and show local md also. na 144 and k 4.8. glu 134 elevated and maybe not fasting. cr 0.83. wbc 4.1 hg 15.3 plat 170.  Prior April ast 67 and alt 66 and alk 362 and tb 0.9 so liver labs little lower this last time despite the low dose and alk little lower also. calcium prior 10.1 and is not on any calcium supplements.  He says local doctor following thyroid and says he is doing better.  4- wbc 4.0 and hg 14.3 and plat 155 and neutrophils 1.2 little low. glu 130 and cr 0.78 and na 140 and k 4.4 and cl 101 and co2 22 and alb 4.3 and tb 0.9 and alk 362 and ast 67 and alt 66 and inr 1.1.  2-17-20 and wbc 4.1 hg 14.5 plat 153 and neutrophils 1.3 and glu 143 and cr 0.96 and na 140 and k 4.3 and cl 100 and co2 25 and alb 4.0 and tb 0.8 and alk 352 and ast 66 and alt 61 and inr 1.0.  12-16-19 and wbc 3.6 and hg 14.3 and plat 184 and neutrophils 1.3 and glu 124 and cr 0.86 and na 142 and k 5.0 and cl 102 and cl2 25 and alb 4.3 and tb 0.9 and alk 353 and ast 70 and alt 67 and inr 1.0.  Document Type: US Abdomen Doppler Complete Document Date: August 10, 2020 10:04 Document Status: Auth (Verified) Document Title: US Abdomen Doppler Complete Performed By: Jack Martin Verified By: Candelaria Rodriguez on August 10, 2020 11:41 Encounter info: [id number], [facility], Single Visit OP, 8/10/2020 - * Final Report * Reason For Exam primary billary cholangitis REPORT EXAM: US Abdomen Doppler Complete, US Abdomen Complete CLINICAL INDICATION: Primary billary cholangitis. TECHNIQUE: Grayscale, pulsed wave and color Doppler sonography of the upper abdomen were performed. ESRC.3.7.1 COMPARISON: None FINDINGS: Liver: Coarsened echotexture. No lesions. Bile Ducts: No dilated intrahepatic biliary radicles. Common duct measures 4 mm. Gallbladder: Normal. Wick's sign is negative. Pancreas: Partially obscured by overlying structures. Right Kidney: Measures 11.4 cm. Normal echogenicity. No hydronephrosis. 6.4 x 5.9 x 5.3 cm inferior pole cystic lesion with internal thickened septation versus areas of nodularity, indeterminate. Hyperechoic nonshadowing cortical focus measuring 0.5 x 0.4 x 0.2 cm.. Nonshadowing hyperechoic focus near the corticomedullary junction measuring 1.1 x 0.8 x 0.6 cm. Left Kidney: Measures 10.6 cm. Normal echogenicity. No hydronephrosis. Spleen: Measures 12.2 cm. Aorta: Normal caliber where imaged. IVC: Normal proximally, not imaged distally. Hepatic Veins: Normal. Portal Veins: Hepatopetal flow. Velocity of 27 cm/s in the main portal vein, 25 cm/s in the right portal vein, and 20 cm/s in the left portal vein. Hepatic Arteries: Peak systolic velocity 115 cm/s. Resistive index 0.77. Other: None. IMPRESSION: 1. Complex cystic lesion within the right kidney with thickened septation/areas of nodularity. Recommend MRI for further evaluation to exclude enhancing/solid components. Nonshadowing echogenic foci in the right kidney may represent nonobstructing stones although underlying lesions cannot be excluded. This can be evaluated at the time of MRI. 2. Coarsened hepatic echotexture can be seen with hepatic steatosis or chronic liver disease. No intra or extrahepatic biliary ductal dilation. Patent hepatic vasculature. The images were reviewed and interpreted by Candelaria Rodriguez MD. Signature Line *** Final *** Electronically Signed By: Candelaria Rodriguez on 08/10/2020 11:41 Dictated by: Jack Martin  12- u/s coarse hepatic ehcotexure and consistent with cirrhosis and 1.2cm hepatic cyst. Patent vessels and right renal cysts up to 5.7cm and some nonobstructing right renal calculi. Spleen 12.3cm.  Oct 14 2019 wbc 3.8 and hg 14.4 plat 177 and neutrophils 1.3 little low and glu 123 and cr 0.74 and na 141 and k 4.5 and cl 100 and co2 25 and alb 4.3 and tb 0.9 and alk 336 ast 67 and alt 61. hep b immune 40.5  Sept 18 2019 na 140 and k 4.7 and cl 103 and glu 121 and bun 12 and cr 0.79 alb 3,8 and tb 0,9 and ca 9.6 and ast 49 and alt 48 and alk 334 and a1c 5.7 and chol 228 and trg 52 and hdl 86 and ldl 132 nd psa 0.01 and wbc 4.2 and hg 144 and plat 183.  June 19 2019 and liver midly coarse and 1cm hepatic cyst and stable and gb not distended and no stones and bile ducts not dilated and spleen stable 12.3cm abd right kdiney 11cm and several right kidney cysts up to 6.2cm. Saw stone sin the right kidneuy. No hydronephrosios. Vessels patent. No change vs 2018.  Dec 2018 u.s with liver appearing fatty and patent vessels. Right kidney cyst 6.1x4.7x5.3cm stable abd simple. Liver cyst 1.1x0.8x1.1.cm. Similar to prior scan.  April 2019 labs wbc 4.3 hg 14.7 plat 183 and glu 134 and cr 0.85 and na 141 k 5.1 and tb 1.0 and alk 446 and ast 85 and alt 91.  Feb 2019 alk 418 and ast 79 and alt 81.  Dec 2018 alk 440 and tb 0.9 and db 0.48 and ast 76 and alt 92. Liver 76% and bone 20% and intestine 4%.  11/26/2018: wbc 4.6, hgb 14.7, plts 175,000, gluc 130, vet 0.89, na 142, k 4.0, alb 4.4, frances 2.9, t.bili 1.0, , AST 75, ASLT 80,INR 1.0, TSH 2.26  9/04/2018: wbc 4.6, hgb 14.4 plts 166,000, gluc 130, creat 0.95, na 143, k 4.5, alb 4.3, frances 3.1, t.bili 0.8, , AST56, ALT 62, INR 1.0, TSH 2.17,  6/05/2018: HBsAb 4.5, HBsAg neg, HBcAb neg, HAV pos  6/20/2018: liver echogenic suggesting mild fatty infiltration, simple cyst 1.1 x 0.7 x 1.3,, gallbladder unremarkable, mpv patent, cbd 4mm, right kidney simple cyst 5.6 x 4.8 x 5.2 cm, echoegenic focus 1.3cm consider kidney stone.  His cholesterol is checked by Dr. Sachin Dimas. He says cholesterol has been ok.   Reminded pt re bone density and needs to be following locally and he has not done this and reminded to get with local provider.  11-13-19: spine normal and t score 0.1 and z score 0.1/ femur neck -0.9 and -0.4 and femur total -1.0 and -0.9. He shared with primary md. he will check with primary md to redo as been 2 yrs.  Plan: 1. I am headed to new pbc drug update and we will review what his options are from that. 2. He is somewhat decompensated and ocaliva is not allowed, and need to see if the new meds can be used for this and I suspect not approved for any decomp. 3. Pt luckily dropping labs from 480s to 30s on alk phos and need to follow. 4. Pt will call if issues.   Duration of the visit was 31  minutes with 10 minutes of chart prep and 21 minutes by [facility] telemed video with time spent reviewing historical and recent records, discussing theircurrent status relative to same and reviewing future plans for the patient.

## 2024-11-01 ENCOUNTER — LAB OUTSIDE AN ENCOUNTER (OUTPATIENT)
Dept: URBAN - METROPOLITAN AREA TELEHEALTH 2 | Facility: TELEHEALTH | Age: 84
End: 2024-11-01

## 2024-11-14 ENCOUNTER — TELEPHONE ENCOUNTER (OUTPATIENT)
Dept: URBAN - METROPOLITAN AREA CLINIC 91 | Facility: CLINIC | Age: 84
End: 2024-11-14

## 2024-11-14 RX ORDER — URSODIOL 500 MG/1
1 TABLET TABLET ORAL
Qty: 180 | Refills: 1

## 2024-11-25 ENCOUNTER — LAB OUTSIDE AN ENCOUNTER (OUTPATIENT)
Dept: URBAN - METROPOLITAN AREA TELEHEALTH 2 | Facility: TELEHEALTH | Age: 84
End: 2024-11-25

## 2024-12-11 ENCOUNTER — TELEPHONE ENCOUNTER (OUTPATIENT)
Dept: URBAN - METROPOLITAN AREA CLINIC 91 | Facility: CLINIC | Age: 84
End: 2024-12-11

## 2025-01-06 ENCOUNTER — LAB OUTSIDE AN ENCOUNTER (OUTPATIENT)
Dept: URBAN - METROPOLITAN AREA TELEHEALTH 2 | Facility: TELEHEALTH | Age: 85
End: 2025-01-06

## 2025-01-07 ENCOUNTER — TELEPHONE ENCOUNTER (OUTPATIENT)
Dept: URBAN - METROPOLITAN AREA CLINIC 86 | Facility: CLINIC | Age: 85
End: 2025-01-07

## 2025-01-07 LAB
ALBUMIN: 3.8
ALKALINE PHOSPHATASE: 609
ALT (SGPT): 94
AST (SGOT): 114
BASO (ABSOLUTE): 0
BASOS: 1
BILIRUBIN, TOTAL: 3.6
BUN/CREATININE RATIO: 18
BUN: 13
CALCIUM: 9.7
CARBON DIOXIDE, TOTAL: 24
CHLORIDE: 104
CREATININE: 0.73
EGFR: 90
EOS (ABSOLUTE): 0.2
EOS: 6
GLOBULIN, TOTAL: 3.6
GLUCOSE: 116
HEMATOCRIT: 41.7
HEMATOLOGY COMMENTS:: (no result)
HEMOGLOBIN: 13.3
IMMATURE CELLS: (no result)
IMMATURE GRANS (ABS): 0
IMMATURE GRANULOCYTES: 0
INR: 1.1
LYMPHS (ABSOLUTE): 1.5
LYMPHS: 40
MCH: 29.2
MCHC: 31.9
MCV: 92
MONOCYTES(ABSOLUTE): 0.4
MONOCYTES: 11
NEUTROPHILS (ABSOLUTE): 1.5
NEUTROPHILS: 42
NRBC: (no result)
PLATELETS: 141
POTASSIUM: 4.6
PROTEIN, TOTAL: 7.4
PROTHROMBIN TIME: 12.4
RBC: 4.55
RDW: 14.2
SODIUM: 141
WBC: 3.6

## 2025-01-07 NOTE — HPI-TODAY'S VISIT:
Dear Roel Trejo,   January 6 labs show INR normal at 1.1 and prothrombin time slightly up at 12.4 from 12.1. Glucose was up again at 116 and please share with local providers.  It was also elevated at 112 back in September.  BUN of 13 creatinine 0.73 sodium 141 potassium 4.6 calcium 9.7 albumin 3.8. Your bilirubin was up a little higher at 3.6 from 2.2.  Alk phos was higher at 609 from 373 AST up to 114 from 89 and ALT up to 94 from 74 so definitely a little bit an upward trend seen there.  Have you been ill recently?  Any new medicines?  Very suspicious that you are taking something new as your labs have been very relatively stable for you for a while now. WBC 3.6 hemoglobin 13.3 platelet count up to 141 from 117 still low though. MCV 92.  Neutrophils 1.5 and lymphocytes 1.5.  That is a little bit higher 40 which makes me wonder if you were ill recently. Called patient regarding labs. Says eating more and denies not ill recently.  No new herbs or vitamins. We will redo the labs next week. Dr Longoria

## 2025-01-09 ENCOUNTER — TELEPHONE ENCOUNTER (OUTPATIENT)
Dept: URBAN - METROPOLITAN AREA CLINIC 91 | Facility: CLINIC | Age: 85
End: 2025-01-09

## 2025-01-13 ENCOUNTER — LAB OUTSIDE AN ENCOUNTER (OUTPATIENT)
Dept: URBAN - METROPOLITAN AREA CLINIC 86 | Facility: CLINIC | Age: 85
End: 2025-01-13

## 2025-01-14 ENCOUNTER — TELEPHONE ENCOUNTER (OUTPATIENT)
Dept: URBAN - METROPOLITAN AREA CLINIC 86 | Facility: CLINIC | Age: 85
End: 2025-01-14

## 2025-01-14 LAB
ALBUMIN: 4
ALKALINE PHOSPHATASE: 591
ALT (SGPT): 95
AST (SGOT): 113
BILIRUBIN, DIRECT: 2.8
BILIRUBIN, TOTAL: 3.6
PROTEIN, TOTAL: 7.4

## 2025-01-14 NOTE — HPI-TODAY'S VISIT:
Dear Roel Trejo, Jan 13 labs show total protein 7.4 and alb 4.0 and tb 3.6 and direct 2.80 and alk 591 and ast 113 and alt 95. Jan 6 recent lavbs showed tb 3.6 (same) and alk 609 little higher) and ast 114 and alt 94 so fairly close. Not clear what driving this and need to follow labs. Please be sure not on any herbals or energy drinks or immune stimulants. Please redo labs in 2 weeks and we will discuss this more at the visit. Dr Longoria

## 2025-01-16 ENCOUNTER — OFFICE VISIT (OUTPATIENT)
Dept: URBAN - METROPOLITAN AREA TELEHEALTH 2 | Facility: TELEHEALTH | Age: 85
End: 2025-01-16

## 2025-01-16 RX ORDER — OXYCODONE HYDROCHLORIDE 5 MG/1
1 TABLET AS NEEDED TABLET ORAL
Status: ACTIVE | COMMUNITY

## 2025-01-16 RX ORDER — AMLODIPINE BESYLATE 5 MG/1
1 TABLET TABLET ORAL ONCE A DAY
Refills: 0 | Status: ACTIVE | COMMUNITY
Start: 1900-01-01

## 2025-01-16 RX ORDER — URSODIOL 500 MG/1
TAKE 1.5 IN AM AND 1 IN PM ALTERNATING 1 PO TWICE A DAY TABLET ORAL
Qty: 202.5 TABLETS | Refills: 0

## 2025-01-16 RX ORDER — MIRABEGRON 25 MG/1
1 TABLET TABLET, FILM COATED, EXTENDED RELEASE ORAL ONCE A DAY
Status: ACTIVE | COMMUNITY

## 2025-01-16 RX ORDER — FAMOTIDINE 40 MG/1
1 TABLET AT BEDTIME TABLET, FILM COATED ORAL ONCE A DAY
Status: ACTIVE | COMMUNITY

## 2025-01-16 RX ORDER — URSODIOL 500 MG/1
1 TABLET TABLET ORAL
Qty: 180 | Refills: 1 | Status: ACTIVE | COMMUNITY

## 2025-01-16 RX ORDER — GABAPENTIN 300 MG/1
1 CAPSULE CAPSULE ORAL TWICE A DAY
Status: ACTIVE | COMMUNITY

## 2025-01-16 RX ORDER — ATENOLOL 25 MG/1
TAKE ONE TABLET BY MOUTH ONE TIME DAILY TABLET ORAL
Qty: 90 UNSPECIFIED | Refills: 0 | Status: ACTIVE | COMMUNITY

## 2025-01-16 NOTE — HPI-TODAY'S VISIT:
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  He got a new Zumper phone.   Dear Roel Trejo,  Jan 13 labs show total protein 7.4 and alb 4.0 and tb 3.6 and direct 2.80 and alk 591 and ast 113 and alt 95.  Jan 6 recent lavbs showed tb 3.6 (same) and alk 609 little higher) and ast 114 and alt 94 so fairly close.  Not clear what driving this and need to follow labs.  Please be sure not on any herbals or energy drinks or immune stimulants.  Please redo labs in 2 weeks and we will discuss this more at the visit. Dr Lonogria Dear Roel Trejo,    January 6 labs show INR normal at 1.1 and prothrombin time slightly up at 12.4 from 12.1.  Glucose was up again at 116 and please share with local providers. It was also elevated at 112 back in September. BUN of 13 creatinine 0.73 sodium 141 potassium 4.6 calcium 9.7 albumin 3.8.  Your bilirubin was up a little higher at 3.6 from 2.2. Alk phos was higher at 609 from 373 AST up to 114 from 89 and ALT up to 94 from 74 so definitely a little bit an upward trend seen there. Have you been ill recently? Any new medicines? Very suspicious that you are taking something new as your labs have been very relatively stable for you for a while now.  WBC 3.6 hemoglobin 13.3 platelet count up to 141 from 117 still low though.  MCV 92. Neutrophils 1.5 and lymphocytes 1.5. That is a little bit higher 40 which makes me wonder if you were ill recently.  Called patient regarding labs. Says eating more and denies not ill recently. No new herbs or vitamins.  We will redo the labs next week.  Dr Longoria  September 24, 2024 labs show INR normal at 1.1 and prothrombin time elevated at 12.1. Glucose was elevated 112 and previously had been 108. Please share with local providers.  BUN of 15 creatinine 0.79 sodium 140 potassium 0.7 calcium 9.4 albumin 3.8.  Bilirubin slightly higher at 2.2 and alk phos slightly lower at 373 and AST 89 and ALT of 74 previously AST 84 ALT of 71. These are similar.  WBC slightly low at 3.2 and was normal before. This can vary sometimes.  Hemoglobin 13.9 and platelet count was a little higher at 117. MCV was 96.  Neutrophils were a little low at 1.2 but up from 1.1. Lymphocytes were normal at 1.4.   Sept 5 u.s: states visualization of the liver mildly limited due to its high position under the costal margin.  No hepatic abnormality seen.  Liver slightly nodular surface contour and compatible with hx of cirrhosis.  Liver was mildly heterogeneous and small 1cm cyst at the left lobe.  Small ascites seen near the liver. Important to stay low salt diet.  Pancreas normal where seen.  Gallbladder no obvious gallstone or polyp.  Common duct not seen due to gas.  Rigth kidney 7mm probable calcification at the mid kidney and peripheral 7x3.5cm simple cyst and felt similar to prior.  Adjacent simple cyst 1.2cm simple cyst. No hydronephrosis.  Left kidney 1.6cm cyst seen mid kidney and faintly delineated and 1cm parapelvic cyst. 6mm parapelvic cyst. No hydroneprhsois.  Spleen mildly enalrged 13.1cm and similar to prior.  Vessels patent but gas limtied exam of the right and left hepatic veins,  Aorta moderate atheroslcerotic changes and felt unchanged and important to watch cholesterol lipids.  Overall cirrhotic liver and 1cm cyst seen and no other issues, Small ascites seen and need to stay low salt diet as salt can lead to more fluid formation. Mild splenomegaly and bilateral renal cysts and one renal cyst was not as well seen.    July 15 labs show glucose down to 108 from 115 and prior 136.   BUN of 15 creatinine little bit higher at 0.96 from 0.8. Many people lately have been running a little bit higher due to the increase in heat on their hydration and kidney function. Working on your hydrational status with the heat may help with that to be back to usual. Please share with local providers. Sodium 141 potassium 4.6 calcium 9.4 albumin 3.8 and these are normal. Bilirubin remains a little lower at 1.9 from prior January 2.2. Alk phos a little lower at 405 from 461 and 483. AST lowered at 84 from 95 and prior 96 ALT down to 71 from 90 and prior 117. Is or anything different that you are doing that could be dropping this? Says he was taken off the vitamin d3 and stopped mvi.  WBC 3.6 hemoglobin 13.2 plate count 108 which is a little low but up from 106 back in April. MCV normal 94. Neutrophils remain a little low at 1.1 from 1.3 and lymphocytes normal at 1.8. INR normal at 1.1.  2 new drugs approved pbc and headed to a meeting to learn more and we will see. These have less itching that the ocaliva does. They have less of the itching induction.  Last imaging feb and says u.s was rebooked and rebooked sept at [facility]>  April 10 labs show glucose elevated at 115 but down from 136 in January. BUN of 15 creatinine 0.8 sodium 141 potassium 5.1 calcium 10.0 albumin 3.8 and these are stable for you. Bilirubin was down to 1.9 from 2.2 so that was good to see. Alk phos also lower at 461 AST slightly low at 95 and ALT slightly lower at 90. Need to keep following these labs for trends. Good to see them at least  not going up anymore like they did back in January. WBC 3.4 hemoglobin 14.2 platelet count 106 a little bit down from 127. Neutrophils and lymphocytes were checked with a neutrophils low at 1.3 and lymphocytes normal at 1.6.  DId the knee surgery was late sept 2024 and he did well. He is moving more and on therapy and that will help. 3- Wrote for knee surgery Vocal-Jose score using these labs: 30 day mortality: 0.9% 90 day mortality: 2.2% 180 day mortality: 4.1% 90 day decompensation 5.5%. Estimated Meld was 9.0 and meld na 9.0 as no recent INR.  Local [facility] ultrasound from February 13 2024 was sent in to us. Unclear why we had not received it until now. Liver had a nodular surface contour and a heterogeneous appearance of the liver parenchyma.  No discrete mass was seen.  Portal vein appeared patent with appropriate directional flow. Pancreas was normal were seen. Gallbladder showed no evidence of inflammation or sludge.  Negative Wick sign.  Normal wall thickness was seen.  6 mm gallbladder wall echogenic focus was seen. Common bile duct showed no evidence of duct dilation. Right kidney showed no hydronephrosis was 10.6 cm.  You had a 5.8 cm simple appearing cyst and an 8 mm echogenic focus. Left kidney showed no hydronephrosis and was 11 cm with a 1.1 cm simple cyst seen.  Please share with local providers. Spleen was mildly enlarged at 13.1 cm. In regards to your aorta, you do have some signs of scattered atherosclerotic plaque.  Aorta measured up to 2.5 cm.  Important to share with your primary provider so that they can follow your cholesterol issues in regards to this. Liver vessels were patent. Splenic vessels were patent. In summary, they felt that you had a cirrhotic appearing liver without any definite liver lesions and mild splenomegaly.  They suspect that you may have a 6 mm possible gallbladder polyp versus adherent stone and a possible 8 mm right kidney nonobstructive calculus versus echogenic area. I went back and looked at your prior ultrasound from last year and it had suggested that the gallbladder polyp at that timeframe was 3 mm in size and said it was a possible polyp versus stone. We need to talk about considering doing a follow-up ultrasound in 3 months to check if the gallbladder echogenic focus continues to grow and if so, we need to talk with surgery about considering a gallbladder surgery for that if it indeed continues to grow.  May be due for egd with Dr Vann as done usually every 2 yrs.  June 9, 2022 EGD was sent in by Dr. Vann' office.  She actually called me on Friday late after office closed to get the fax number and I advised her to have them call Sweta today to get the monitored fax line for urgent faxes. I also asked Sweta to call her office  today, to give her the exact fax that could be monitored and that led to an almost immediate receipt of the report which I was very happy to get. In that report she states that your duodenum was normal but you did have diffuse moderate inflammation characterized by congestion/edema with erosions and erythema in the gastric body and gastric antrum and that she did biopsies with cold forceps for this. She is that you had a small hiatal hernia with some grade B esophagitis which means some irritation of the esophagus. 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 She recommended a repeat endoscopy in 3 to 5 years, but I would recommend given your clinical course, that it be instead considered to be repeated in 2 years instead because of the fact that you had grade 1 varices and we need to see if those are getting larger or not with your liver disease issues staying on higher labs.  We can discuss this at your upcoming visit and confer also with Dr. Vann considers about this. My main concern is that the liver labs are remaining up and so I just want to make sure that this is not getting any worse in the interim.   January 16 labs show sugar slightly up at 136 and unclear if you were fasting or not? BUN of 13 creatinine 0.81 sodium 141 potassium 4.7 chloride 102 and CO2 of 26 and these are normal. Your calcium was slightly up at 10.3 with normal being from 8.6 up to 10.2. Have you been taking any calcium or vitamin D supplements lately? Not on vitamin supplements but eating little more. Albumin was 4.2 normal. Bilirubin slightly higher at 2.2 from 1.6 and alk phos 483 from 442. AST up slightly to 96 and ALT also up to 107. Any recent illness or other issues that may have caused the labs to fluctuate up versus your prior labs? WBC 3.9 hemoglobin 14.5 and platelet count slightly lower at 127 from 132 before. MCV 90. Neutrophils were normal at 1.5 and lymphocytes 1.8 which would suggest less likely for you to have been ill recently.  No recent illnes. Says exercise is less due to weather.  10/16/23 telemed  Oct 10 labs show wbc 3.9 and hg 13.8 and platelets 132 little lower and prior normal 159. Mcv normal 90. Neutrophils 1.3 little low and lymphs 1.8 normal. Glucose 124 elevated and prior 124 also. Were you fasting this day? Bun 16 and cr 0.85 and stable.Na 140 and k 4.4 and cl 103 and co2 26. Ca 9.8 and alb 3.8 and tb 1.6 little lower from 1.8.Alk 442 little lower from 496. Ast 83 and alt 81 and both down from prior 108 ast and alt 98.  October 9, 2023. Also saw that i was listed as your ordering provider for this (I do not see that we have ordered this) and it states that you have no evidence of any abdominal aortic aneurysm.  August 30 labs show albumin stable at 4.1, bilirubin down to 1.8 from 2.0. Direct bilirubin 1.1. Alk phos 456 slightly up from 432 in May. AST 85 down from 105. ALT 82 down from 103 in May. Glad to see that that this is starting to look like it is coming down lets see what the trend is.  July 10 2023 and was reviewed by Dr. Hopkins per the note. And mentioned that you had osteopenia with your spine score being -0.7 the left hip -1.3 and the left femoral neck being -1.6. There is a suture fracture risk for the hip is 0.7% and major osteoporotic fracture risk is 2.5% over the next 10 years.  July 10 2023 ultrasound shows liver coarsened in its echotexture with blunting of the liver edges and mildly nodular contour.  No lesions seen. No dilated bile ducts seen and common bile duct 4.6 mm. Small gallbladder polyp seen measuring 3 x 3 x 3 mm.  Wick sign negative. Of note, prior u.s in Jan 2023 did not show this and so we need to follow this over time. Typically the concern is if this is a gallbladder polyp and not a stone is if it grows to 10 mm or more in size. Pancreas not seen due to overlying structures. Right kidney 10.4 cm with no hydronephrosis but with a simple right renal cyst measuring 6.4 x 6.7 x 3.9 cm that was previously 6.5 x 4.0 x 5.5 cm.  Punctate echogenic focus seen in the mid right kidney measuring 0.5 x 0.7 x 0.4 cm favored to be a stone. Left kidney 10.6 cm with no hydronephrosis. Spleen 13.2 cm. Liver vessels were patent which was good to see. In summary, they see chronic liver disease but without any sonographically evident lesions and patent vessels. They feel that this is a small gallbladder echogenic focus measuring 3 mm that they think is a pedunculated polyp versus less likely a stone. We will need to monitor this and look for any changes over time.  July 6 labs show lipase normal at 41, INR normal at 1.0, glucose was elevated at 124 down from 135. BUN of 15 creatinine normal 0.85 sodium 138 potassium 4.4 calcium 9.8 albumin 3.9. Bilirubin slightly higher at 1.8 from 1.7.  Alkaline phosphatase 496 up slightly from 491.   from 98 and ALT 98 from 94. WBC 3.9, hemoglobin 13.7 platelet count 159 MCV 92.  Neutrophils 1.6 and lymphocytes 1.5.  Sadly for these labs our only option would be to consider trying the Ocaliva again which had side effects for you versus continuing to wait for new or medicines for PBC to come out which may be next year or the following.  There is a medicine from [place] that they are trying to get approved.  That would be the next 1 to come out.  June 8 labs show sugar elevated at 135 and previously 119.  Have you been fasting when you do these labs?  There clearly are remaining elevated. BUN of 13 creatinine 0.756 sodium 141 potassium 4.6 chloride 106 CO2 of 25 albumin 3.9. Bilirubin slightly higher at 1.7 from 1.6.  Alk phos slightly higher at 491 from 452.  AST up to 98 from 87 and ALT up to 94 from 88.  Unclear to me if the sugars could also be making the liver labs be trending higher?  Have you done a hemoglobin A1c with primary provider to see what that shows? WBC 3.9 hemoglobin 13.6 platelet count slightly low at 147 MCV 92 and neutrophils 1.6 and lymphocytes 1.6. I think that looking at and clarifying the issue as to your need for getting the sugars to be optimal may be a reasonable issue to focus on for you and seeing what that does may help lower these. It is known that  sugars if not controlled can impact many other liver diseases.  May 10 labs show albumin 4.1, bilirubin elevated 2.0 and was 1.37 on the direct.  Alk phos was 452 slightly lower.  AST was slightly higher at 105 from 93 and ALT was 103 up from 98. Called pt to see if he is on a new meds. He denies any herbs or teas. No illness. No antibiotics. No nsaids. Not eating any excess of any vegetable supplements. Wt  was 77.5 kg and so now 1165 is max 1000 alternate 1250mg a day to get to dose that fits weight now.  Sweta was able to obtain your ultrasound and gave it to me, which was dated January 9, 2023, and it mentions that your liver had increased geographic echogenicity with macro and micronodular contour but no definite lesions. Liver showed no dilated bile ducts and common bile duct 3.4 mm. Gallbladder normal. Pancreas normal. Right kidney measured 9.5 cm with normal echogenicity and no hydronephrosis and simple right renal cyst measuring 6.5 x 4 cm and was previously 6.4 x 6 cm.  This was better evaluated on the more recent MRI.  Nonshadowing echogenic focus seen in the mid renal pole measuring 1.4 x 1.1 cm and felt likely corresponding to renal sinus fat. Left kidney measure 11.4 cm with normal echogenicity. Spleen was normal at 12 cm. Liver vessels were patent. Given this you need to repeat the scan in 6 months which would be then in the July timeframe. We will put in the order for this to be done in July for you.   April 7 labs show sugar still elevated at 119 but down from 131 December.  Please share with primary provider.  BUN is 17 creatinine 0.77 which is actually better than in December when it was 0.89.  Both however are normal range. Sodium 141 potassium 4.8 and chloride 101 which is normal. Your calcium was up at 10.5.  Are you taking a calcium and vitamin D supplement?  If you are that sometimes can cause this.  Please share with primary provider as this was normal before at 10.2 and prior to that 9.7. Asked pt and he is on vit d and regular vitamin. Asked him to check with primary re that as his calcium was high. Albumin normal at 4.1. Bilirubin slightly higher this time at 1.6 and previously 1.1.  Alkaline phosphatase was higher at 452 from 423 and AST was higher at 87 and ALT of 88 from prior levels. Asked pt if he was ill and says has been a bit stressed as son ill.  March 7 labs show albumin stable at 4.5 and actually rising which is good to see.  Bilirubin down to 1.1 from 1.3 with a direct 0.68.  Alk phos slightly higher at 470 from 427 AST and ALT slightly higher at 93 and 98 from previous AST of 84 and ALT of 82. Not really see in the labs drop yet and still slightly going up.  Again just confirming no new meds or herbal remedies?  Please let us know.  February 6 labs show albumin 4.4 which is normal and in fact a little higher for you.  Total bilirubin still 1.3 as before on January 9.  Direct bilirubin 0.68.  Alk phos went up a little to 427 from 400 and AST 84 and ALT 82 with previous AST 63 and ALT 69.  Egd from June 9 from Dr Vann. Told not due yet. Gastric body and gastric antrum with congestion, erosions, and erythema. Duodenum was normal. Small hiatal hernia present. LA grade B esophagitis seen in esophagus. 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 They mentioned to redo in 3-5 yrs? They ordered pantoprazole 40mg po qd.  December 21 2022 labs show INR normal at 1.0 which is good to see.  Is actually lower than back in September when it was 1.1. Sugar was elevated at 131 and as we mentioned before that may be related to you doing the labs not fasting.  Please share with primary providers. BUN of 17 creatinine 0.89 sodium 141 potassium 4.8 calcium 10.2 albumin 4.4 bilirubin 1.1 alkaline phosphatase went up a little to 423 from 323.  Any recent issues?  AST was 72 ALT of 70 up from 57 and 49. White blood cell count 4.1 hemoglobin 14.6 platelet count 151 MCV 98 neutrophils 2.2 lymphocytes 1.3. Called pt: took a medicine for his knees: meloxicam he took and has 8% risk to raise the labs. So that is what did that.  He did stop that.   September 19 labs show white blood cell count 3.6 hemoglobin 14.9 platelet count 145 down from 164.  Normal is from 150 up to 450.  1 year ago and September 3, 2021 the platelet count was about the same at 144. Neutrophils 1.6 lymphocytes 1.4 both normal.  Sugar was slightly up at 148 from previous 137.  BUN of 12 Cr 0.68.  Sodium 143 potassium 4.2 albumin 4.2 bilirubin normal at 0.6 and down from 0.9. Alkaline phosphatase down from 404 to 323.  AST slightly lower at 57 from 58.  ALT down to 49 from 58.  So they are moving in the right direction. INR normal at 1.1. Meld 7 and meld na 7 so remains low.  June 20 labs showed sugar elevated at 138.  BUN of 14 creatinine 0.79 sodium 140 potassium 4.6 chloride 102 CO2 27. Albumin 4.0 bilirubin 0.8 down from 1.1.  Urine bilirubin 0.38 down from 0.43.  Alkaline phosphatase 319 from 298 so it went up slightly.  AST went down to 45 and ALT to 46 from previous AST 60 and ALT 60. Overall the liver labs and the AST and ALT are lower and the alk phos slightly higher.  July 9 MRI sent in to me. Lower thorax shows minimal bibasilar atelectasis. Liver showed no significant fat or iron but does have chronic liver disease changes seen including lobar redistribution and nodular contour.  There are some reticular enhancement changes that are compatible with fibrosis.  No suspicious liver lesions seen.   Liver vessels are patent as expected. Small varices seen near the stomach and spleen.  Important to stay on top of egd surveillance for these issues. Recannulized periumbilical vein noted which is another sign of portal hypertension Spleen slightly enlarged at 13 cm. Pancreas and adrenal glands normal. Kidneys show some renal cysts and no further description was given. They did see your appendix and it appeared normal to them. They also saw some mild atherosclerotic disease of the abdominal aorta but without aneurysm.  Please share with primary providers to be aware of same. Degenerative changes seen of your spine. We may be able to look towards doing an ultrasound alternating with an MRI and we will discuss this further at your next visit.   March 15 labs show glucose elevated at 137 previously 143 and please share with primary provider.  BUN of 14 creatinine 0.86 sodium 139 potassium 4.8 calcium 10.0 albumin 4.2 bilirubin 0.9.  These other labs are normal range. Alkaline phosphatase did go up to 404 from previous 394 and prior 327.  AST came down from 63 to 58.  ALT came down from 68 to 58. Hill cell count 4.1 hemoglobin 14.9 platelet count 164 normal.  MCV 89. Neutrophils 1.4 and lymphocytes 1.9.  Ocaliva was on it had toxicity risk and not much lab benefit and being cirrhotic is higher risk and he is  getting older.  January 29 2022 MRI Lower thorax showed bibasilar atelectasis which means that the bases of the lungs were not fully expanded.  So metimes we can see that when you are in the MRI machine in that fixed position for a while.  Please share with primary provider. Liver showed no fat or iron but did have chronic liver disease changes including a nodular contour and lobar redistribution.  Some fibrosis changes seen.  No suspicious lesions.  Small left lobe hepatic cyst seen. Gallbladder normal with some unchanged mild intrahepatic bile duct dilation most pronounced in the periphery. Spleen was top normal in size at 13 cm. Pancreas, adrenal glands, and lymph nodes were normal. Stable renal cyst seen bilaterally. Mild atherosclerosis of the aorta seen without aneurysm.  Small varices near the esophagus and stomach so would need to stay on top of that EGD surveillance. Mild degenerative changes seen of the spine.  He did the covid 19 series and March 5 2021. He did the covid booster.  Meds off ocaliva for summer 2020.   November 30 labs show INR normal at 1.0 which is good to see.  Glucose currently 154 elevated and previously was 143 and prior to that 129.  All 3 were elevated.  We have discussed this previously.  Please share with primary providers per their review. BUN of 13 creatinine 0.84 sodium 142 potassium 4.8 chloride 102 CO2 of 27 calcium elevated at 10.3. Asked if he is on any calcium supplements?  he said not taking any that he knows. Previously was normal at 10.2 and prior 10.1.  Please share with primary provider also.  Albumin 4.5 bilirubin 1.0 which is normal.  Alkaline phosphatase elevated at 394 previously 327 and prior 309.  AST 63 and ALT 68 which appeared to be slightly higher from prior AST of 47 ALT 45. Asked if he had any new meds and he says he is a vit d supplement. White blood cell count 4 hemoglobin 14.8 platelet count slightly low at 146 but improved from last time at 144.  MCV normal at 90.  Neutrophils 1.5 and lymphocytes 1.7. Meld low at 6 and meld na 6.    September 3 labs show INR remains perfectly normal at 1.0 and was previously 1.1. Glucose still remains elevated at 143 previously 129 and share with primary provider.  BUN of 13 creatinine 0.84 sodium 140 potassium 4.5 calcium 10.2 albumin 4.1 bilirubin 1.0.  Previously bilirubin 0.7 but remains normal again at 1.0.  Alkaline phosphatase slightly up at 327 from 309 previously 355.  AST down to 47 from 55 from prior 63.  ALT 45 down from 51 and prior 59 so those continue to drop.  White blood cell count 4.3 hemoglobin 14.9 platelet count 144 which is slightly lower from 162.  MCV 90.  Neutrophils normal at 1.4. Meld remains low at 6 and meld na 6.  July 10 MRI shows the lower thorax to have bibasilar atelectasis. Liver shows morphologic changes of chronic liver disease with nodular contour and lobar redistribution.  They do see changes in the liver parenchyma that are suggestive of fibrosis.  More confluent fibrosis seen in the right lobe. No definite suspicious liver lesions seen.  You do have some perfusion anomalies which need to be rechecked in 6 months.  Scattered hepatic cysts are seen. Gallbladder was unremarkable and mild intrahepatic bile duct dilation was seen also more conspicuous in the right lobe versus the left.  This is felt to be compatible with your PBC diagnosis.  No extrahepatic bile duct dilation seen. Spleen top normal at 12.9 cm with small splenule in the left upper quadrant. Pancreas normal. Renal cysts were seen. Colon diverticulosis was noted as well. No inflammation however was seen in the colon. Mild paraesophageal perigastric and perisplenic varices were seen and moderate aortobiiliac atherosclerosis seen. Overall they felt you had signs of chronic liver disease but no evidence of any malignancy.   June 9 laboratories show white blood cell count 4.3 hemoglobin 13.8 platelet 162.  These are stable for you.  Platelet count is actually better than it was before at 151.  Neutrophils are stable at 1.6 and previously were 1.5.  Total bilirubin is down to 0.7 from 0.8.  Alkaline phosphatase is lower at 309 from 355.  AST down to 55 from 63 ALT 51 down from 59 and prior to that 68.  So the liver labs continue to be dropping.  Sodium 139 potassium 4.6 chloride 102 CO2 26 BUN 17 creatinine 0.93 glucose 129.  Sugar is actually lower than the previous time at 146. INR 1.1.   Meld score low at 7 and meld na 7.  April 27: alb 4.3 and tb 1.1 and db 0.43 and alk 298 and ast 60 and alt 60.  March 2: alk 355 and ast 63 and alt 59 so in fact alk phos lower now to 298 and ast and alt about the same.  8 weeks off labs March 2 and inr 1.0 and tb 0.8 and alk 355 and down from 376 and prior 392. ast 63 and alt 59 and down from 65 and 68 so little lower.  ca 10.0. na 140 and k 5.0 and glu 146 elevated. bun 18 and cr 0.9. wbc 3.9 and hg 14.9 and plat 151 and mcv 90.   4 weeks off labs: jan 25 2021 and inr normal 1.0 and so little lower now. tb 0.8 and lower from 0.9 and alk 376 from 392 so lower and ast 65 and alt 68 and prior ast 60 and alt 68 so not much of a change seen. na 143 and k 4.6 and cl 103 and co2 23 and cr 0.93. glu 128 elevated and mentioned to him. defer to local md re your sugars. cr 0.93. wbc 4.1 hg 14.5 and plat 138 (down from 165) and mcv 89.  Jan 23 mri: Morphologic changes of chronic liver disease without  hepatocellular carcinoma seen so that is good to note. Patent portal venous system with stigmata portal hypertension, including upper abdominal varices and splenomegaly noted so need to stay on top of egd surveillance. Right renal cyst with area of complexity on prior ultrasound demonstrates no septation, enhancement, or nodularity on this examination, compatible  with a simple cyst ( 4.7 x 6.2 x 7.8 cm.) This may have represented artifact on prior ultrasound. Continued attention on follow-up for liver disease was recommended. Liver showed no fat or iron. Scattered simple cysts in the left hepatic  lobe. Periesophageal, perigastric, perisplenic varices seen. Mild intrahepatic duct dilatation peripherally extending to the capsule, greater in the right hepatic lobe, compatible with primary biliary cirrhosis. No extrahepatic biliary ductal dilatation. Normal gallbladder. Spleen was enlarged measuring 13.1 cm in the craniocaudal dimension.Adjacent splenule. Pancreas normal. Mild atherosclerotic disease abdominal aorta without aneurysm. Mild degenerative changes of the spine.  Document Type: MRI Abdomen w/ + w/o Contrast Document Date: January 23, 2021 09:35 Document Status: Auth (Verified) Document Title: MRI Abdomen w/ + w/o Contrast Performed By: Jason Kapadia Verified By: Jason Kapadia on January 25, 2021 07:35 Encounter info: [id number], [facility], Single Visit OP, 1/23/2021 - 1/23/2021 * Final Report * Reason For Exam PBC//HEPATIC FIBROSIS//DIABETES//FATTY LIVER//RENAL CYST//ELEVATED ALKALINE PHOSPHATASE LEVEL REPORT EXAM: MRI Abdomen w/ + w/o Contrast CLINICAL INDICATION: PBC//HEPATIC FIBROSIS//DIABETES//FATTY LIVER//RENAL CYST//ELEVATED ALKALINE PHOSPHATASE LEVEL. TECHNIQUE: Multisequence, multiplanar MRI of the abdomen was performed without and with intravenous contrast. ESRC.2.7.3 CONTRAST: 16 cc of Prohance COMPARISON: Outside MRI dated 9/1/2020. Abdominal ultrasound dated 8/10/2020. FINDINGS: Lower Thorax: Normal. Liver: No fat or iron. Morphologic changes of chronic liver disease, including lobar redistribution and nodular contour. Delayed reticular enhancement of the hepatic parenchyma, compatible with fibrosis. More confluent fibrosis in the right hepatic lobe. Scattered simple cysts in the left hepatic lobe. No hepatocellular carcinoma. Hepatic vasculature is patent. Recannulized paraumbilical vein. Periesophageal, perigastric, perisplenic varices. Gallbladder/Biliary Tree: Mild intrahepatic duct dilatation peripherally extending to the capsule, greater in the right hepatic lobe, compatible primary biliary cirrhosis. No extrahepatic biliary ductal dilatation. Normal gallbladder. Spleen: Enlarged measuring 13.1 cm in the craniocaudal dimension. Adjacent splenule. Pancreas: Normal. Adrenal Glands: Normal. Kidneys/Ureters: Renal cysts. Right renal cyst with area of complexity on prior ultrasound demonstrates no septation, enhancement, or nodularity on this examination and measures 4.7 x 6.2 x 7.8 cm. Gastrointestinal: No bowel obstruction. Lymph Nodes: Normal. Vessels: Mild atherosclerotic disease abdominal aorta without aneurysm. Peritoneum/Retroperitoneum: Normal. Bones/Soft Tissues: Mild degenerative changes of the spine. IMPRESSION: 1. Morphologic changes of chronic liver disease without hepatocellular carcinoma. 2. Patent portal venous system with stigmata portal hypertension, including upper abdominal varices and splenomegaly. 3. Right renal cyst with area of complexity on prior ultrasound demonstrates no septation, enhancement, or nodularity on this examination, compatible with a simple cyst. This may have represented artifact on prior ultrasound. Continued attention on follow-up for liver disease. Signature Line *** Final ***  Electronically Signed By: Jason Kapadia on 01/25/2021 07:35 Dictated by: Jason Kapadia  2 weeks off ocaliva: Sanju 15: glu 117 elevated some but similar to nov 117 but down from 204 in dec 30. bun 16 and cr 0.91. na 141 and k 4.7 and cl 102 and ca 10.1 and alb 4.2 and tb 0.9 down from 1.1 in nov. ast 60 and alt 68 and so about the same as you can see vs other two labs and alk 392  about the same vs dec but little up from nov 354. wbc 4.1 hg 14.8 plat 165. inr 1.1 stable. So no dramatic changes seen yet that point.  Dec 30 2020 labs in middle: wbc 3.9 hg 15.1 plat 167. mcv 97. tb 0.9 down  from 1.1 and alk 399 slightly higher from 354. ast 64 and alt 66 and prior ast 63 and alt 60. na 139 and k 4.7 and cl 99 and co2 27. glu 204 elevated so that is newer issue as prior 117 and 134 so show to local md. bun 15 and cr 1.07 little up and prior 0.85 so little dry and could be linked to the sugars.  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  He is on urosodiol 500mg BID and had been on for years Ocaliva 10mg q other day due to pruritis and then off due to pruritis.  He weighs 183. Max dose 1100 to 1250 range 13-15mg/kg and we can ursodiol to 1.5 in the am and 1 in the evening. that may help and will avoid something more risky.  Itching much better on the gabapentin and off the ocaliva.  Nov 25 2020: wbc 4.2 hg 15 and plat 150 and mcv 90. Neutrophils 1.3 and prior 1.4 and lymphs 2.0 and prior 1.9. tb 1.1 and alk 354 and ast 63 and alt  60 and prior tb 0.9 and alk 343 and ast 55 and alt 61. So about the same. glu 117 and down from 134. bun 15 and cr 0.85 and na 139 and k 4.4 and cl 101 and co2 25. alb 4.2 normal.  Saw local mri: 9-1 20: nonenhancing renal cyst and no definite renal mass. Largest cyst right kidney 5.7cm. Not surprisingly they thought liver appeared to be cirrhotic. Also apparent nonenhancing cysts in the liver up to 10.6mm. Nonspecific biliary dilation in liver. Extrahepatic duct appears decompressed. Nonspecific splenomegaly seen and no significant varices seen. Left adrenal suspected adenoma. Appendix seemed somewhat prominent to then at 6.4mm but was probably similar to prior per them. No definite gallstones. Prior [place] imaging liver coarsened. They recommended mri to better see possible complex renal cyst.  Saw urologist re the kidney issue was stable. They were to see him again in feb 2021 and had been changed.  The patient relates no significant family or personal history of liver disease. He states no history of new medications or alcohol use. The  patient reports a personal history of no other habits that could cause liver damage.  July 2020: tsh very low and show local md. T4 normal 8.5. inr 1.1 and tb 0.9 and alk 343 and ast 55 and alt 61 and tb 0.9 and alb 4.2 and ca 10.6 elevated and show local md also. na 144 and k 4.8. glu 134 elevated and maybe not fasting. cr 0.83. wbc 4.1 hg 15.3 plat 170.  Prior April ast 67 and alt 66 and alk 362 and tb 0.9 so liver labs little lower this last time despite the low dose and alk little lower also. calcium prior 10.1 and is not on any calcium supplements.  He says local doctor following thyroid and says he is doing better.  4- wbc 4.0 and hg 14.3 and plat 155 and neutrophils 1.2 little low. glu 130 and cr 0.78 and na 140 and k 4.4 and cl 101 and co2 22 and alb 4.3 and tb 0.9 and alk 362 and ast 67 and alt 66 and inr 1.1.  2-17-20 and wbc 4.1 hg 14.5 plat 153 and neutrophils 1.3 and glu 143 and cr 0.96 and na 140 and k 4.3 and cl 100 and co2 25 and alb 4.0 and tb 0.8 and alk 352 and ast 66 and alt 61 and inr 1.0.  12-16-19 and wbc 3.6 and hg 14.3 and plat 184 and neutrophils 1.3 and glu 124 and cr 0.86 and na 142 and k 5.0 and cl 102 and cl2 25 and alb 4.3 and tb 0.9 and alk 353 and ast 70 and alt 67 and inr 1.0.  Document Type: US Abdomen Doppler Complete Document Date: August 10, 2020 10:04 Document Status: Auth (Verified) Document Title: US Abdomen Doppler Complete Performed By: Jack Martin Verified By: Candelaria Rodriguez on August 10, 2020 11:41 Encounter info: [id number], [facility], Single Visit OP, 8/10/2020 - * Final Report * Reason For Exam primary billary cholangitis REPORT EXAM: US Abdomen Doppler Complete, US Abdomen Complete CLINICAL INDICATION: Primary billary cholangitis. TECHNIQUE: Grayscale, pulsed wave and color Doppler sonography of the upper abdomen were performed. ESRC.3.7.1 COMPARISON: None FINDINGS: Liver: Coarsened echotexture. No lesions. Bile Ducts: No dilated intrahepatic biliary radicles. Common duct measures 4 mm. Gallbladder: Normal. Wick's sign is negative. Pancreas: Partially obscured by overlying structures. Right Kidney: Measures 11.4 cm. Normal echogenicity. No hydronephrosis. 6.4 x 5.9 x 5.3 cm inferior pole cystic lesion with internal thickened septation versus areas of nodularity, indeterminate. Hyperechoic nonshadowing cortical focus measuring 0.5 x 0.4 x 0.2 cm.. Nonshadowing hyperechoic focus near the corticomedullary junction measuring 1.1 x 0.8 x 0.6 cm. Left Kidney: Measures 10.6 cm. Normal echogenicity. No hydronephrosis. Spleen: Measures 12.2 cm. Aorta: Normal caliber where imaged. IVC: Normal proximally, not imaged distally. Hepatic Veins: Normal. Portal Veins: Hepatopetal flow. Velocity of 27 cm/s in the main portal vein, 25 cm/s in the right portal vein, and 20 cm/s in the left portal vein. Hepatic Arteries: Peak systolic velocity 115 cm/s. Resistive index 0.77. Other: None. IMPRESSION: 1. Complex cystic lesion within the right kidney with thickened septation/areas of nodularity. Recommend MRI for further evaluation to exclude enhancing/solid components. Nonshadowing echogenic foci in the right kidney may represent nonobstructing stones although underlying lesions cannot be excluded. This can be evaluated at the time of MRI. 2. Coarsened hepatic echotexture can be seen with hepatic steatosis or chronic liver disease. No intra or extrahepatic biliary ductal dilation. Patent hepatic vasculature. The images were reviewed and interpreted by Candelaria Rodriguez MD. Signature Line *** Final *** Electronically Signed By: Candelaria Rodriguez on 08/10/2020 11:41 Dictated by: Jack Martin  12- u/s coarse hepatic ehcotexure and consistent with cirrhosis and 1.2cm hepatic cyst. Patent vessels and right renal cysts up to 5.7cm and some nonobstructing right renal calculi. Spleen 12.3cm.  Oct 14 2019 wbc 3.8 and hg 14.4 plat 177 and neutrophils 1.3 little low and glu 123 and cr 0.74 and na 141 and k 4.5 and cl 100 and co2 25 and alb 4.3 and tb 0.9 and alk 336 ast 67 and alt 61. hep b immune 40.5  Sept 18 2019 na 140 and k 4.7 and cl 103 and glu 121 and bun 12 and cr 0.79 alb 3,8 and tb 0,9 and ca 9.6 and ast 49 and alt 48 and alk 334 and a1c 5.7 and chol 228 and trg 52 and hdl 86 and ldl 132 nd psa 0.01 and wbc 4.2 and hg 144 and plat 183.  June 19 2019 and liver midly coarse and 1cm hepatic cyst and stable and gb not distended and no stones and bile ducts not dilated and spleen stable 12.3cm abd right kdiney 11cm and several right kidney cysts up to 6.2cm. Saw stone sin the right kidneuy. No hydronephrosios. Vessels patent. No change vs 2018.  Dec 2018 u.s with liver appearing fatty and patent vessels. Right kidney cyst 6.1x4.7x5.3cm stable abd simple. Liver cyst 1.1x0.8x1.1.cm. Similar to prior scan.  April 2019 labs wbc 4.3 hg 14.7 plat 183 and glu 134 and cr 0.85 and na 141 k 5.1 and tb 1.0 and alk 446 and ast 85 and alt 91.  Feb 2019 alk 418 and ast 79 and alt 81.  Dec 2018 alk 440 and tb 0.9 and db 0.48 and ast 76 and alt 92. Liver 76% and bone 20% and intestine 4%.  11/26/2018: wbc 4.6, hgb 14.7, plts 175,000, gluc 130, vet 0.89, na 142, k 4.0, alb 4.4, frances 2.9, t.bili 1.0, , AST 75, ASLT 80,INR 1.0, TSH 2.26  9/04/2018: wbc 4.6, hgb 14.4 plts 166,000, gluc 130, creat 0.95, na 143, k 4.5, alb 4.3, frances 3.1, t.bili 0.8, , AST56, ALT 62, INR 1.0, TSH 2.17,  6/05/2018: HBsAb 4.5, HBsAg neg, HBcAb neg, HAV pos  6/20/2018: liver echogenic suggesting mild fatty infiltration, simple cyst 1.1 x 0.7 x 1.3,, gallbladder unremarkable, mpv patent, cbd 4mm, right kidney simple cyst 5.6 x 4.8 x 5.2 cm, echoegenic focus 1.3cm consider kidney stone.  His cholesterol is checked by Dr. Sachin Dimas. He says cholesterol has been ok.   Reminded pt re bone density and needs to be following locally and he has not done this and reminded to get with local provider.  11-13-19: spine normal and t score 0.1 and z score 0.1/ femur neck -0.9 and -0.4 and femur total -1.0 and -0.9. He shared with primary md. he will check with primary md to redo as been 2 yrs.  Plan: 1. I am headed to new pbc drug update and we will review what his options are from that. 2. He is somewhat decompensated and ocaliva is not allowed, and need to see if the new meds can be used for this and I suspect not approved for any decomp. 3. Pt luckily dropping labs from 480s to 30s on alk phos and need to follow. 4. Pt will call if issues.   Duration of the visit was   minutes with 10 minutes of chart prep and 21 minutes by [facility] telemed video with time spent reviewing historical and recent records, discussing theircurrent status relative to same and reviewing future plans for the patient.

## 2025-01-21 ENCOUNTER — OFFICE VISIT (OUTPATIENT)
Dept: URBAN - METROPOLITAN AREA TELEHEALTH 2 | Facility: TELEHEALTH | Age: 85
End: 2025-01-21
Payer: MEDICARE

## 2025-01-21 VITALS — HEIGHT: 65 IN | BODY MASS INDEX: 27.16 KG/M2 | WEIGHT: 163 LBS

## 2025-01-21 DIAGNOSIS — R94.5 LIVER FUNCTION STUDY, ABNORMAL: ICD-10-CM

## 2025-01-21 DIAGNOSIS — K74.3 PBC (PRIMARY BILIARY CIRRHOSIS): ICD-10-CM

## 2025-01-21 DIAGNOSIS — K82.4 POLYP OF GALLBLADDER: ICD-10-CM

## 2025-01-21 DIAGNOSIS — K74.02 HEPATIC FIBROSIS, ADVANCED FIBROSIS: ICD-10-CM

## 2025-01-21 DIAGNOSIS — Z79.899 HIGH RISK MEDICATION USE: ICD-10-CM

## 2025-01-21 DIAGNOSIS — E11.9 DIABETES: ICD-10-CM

## 2025-01-21 DIAGNOSIS — Z13.820 SCREENING FOR OSTEOPOROSIS: ICD-10-CM

## 2025-01-21 DIAGNOSIS — E66.3 OVERWEIGHT: ICD-10-CM

## 2025-01-21 DIAGNOSIS — L29.9 PRURITIC CONDITION: ICD-10-CM

## 2025-01-21 DIAGNOSIS — M19.90 ARTHRITIS: ICD-10-CM

## 2025-01-21 DIAGNOSIS — R74.8 ELEVATED ALKALINE PHOSPHATASE LEVEL: ICD-10-CM

## 2025-01-21 DIAGNOSIS — K76.0 FATTY LIVER: ICD-10-CM

## 2025-01-21 DIAGNOSIS — N20.0 RENAL STONE: ICD-10-CM

## 2025-01-21 DIAGNOSIS — K74.60 CIRRHOSIS OF LIVER WITHOUT ASCITES, UNSPECIFIED HEPATIC CIRRHOSIS TYPE: ICD-10-CM

## 2025-01-21 DIAGNOSIS — N28.1 RENAL CYST: ICD-10-CM

## 2025-01-21 DIAGNOSIS — K76.6 PORTAL HYPERTENSION: ICD-10-CM

## 2025-01-21 PROCEDURE — 99214 OFFICE O/P EST MOD 30 MIN: CPT

## 2025-01-21 RX ORDER — OXYCODONE HYDROCHLORIDE 5 MG/1
1 TABLET AS NEEDED TABLET ORAL
Status: ACTIVE | COMMUNITY

## 2025-01-21 RX ORDER — GABAPENTIN 300 MG/1
1 CAPSULE CAPSULE ORAL TWICE A DAY
Status: ACTIVE | COMMUNITY

## 2025-01-21 RX ORDER — AMLODIPINE BESYLATE 5 MG/1
1 TABLET TABLET ORAL ONCE A DAY
Refills: 0 | Status: ACTIVE | COMMUNITY
Start: 1900-01-01

## 2025-01-21 RX ORDER — FAMOTIDINE 40 MG/1
1 TABLET AT BEDTIME TABLET, FILM COATED ORAL ONCE A DAY
Status: ACTIVE | COMMUNITY

## 2025-01-21 RX ORDER — MIRABEGRON 25 MG/1
1 TABLET TABLET, FILM COATED, EXTENDED RELEASE ORAL ONCE A DAY
Status: ACTIVE | COMMUNITY

## 2025-01-21 RX ORDER — ATENOLOL 25 MG/1
TAKE ONE TABLET BY MOUTH ONE TIME DAILY TABLET ORAL
Qty: 90 UNSPECIFIED | Refills: 0 | Status: ACTIVE | COMMUNITY

## 2025-01-21 RX ORDER — URSODIOL 500 MG/1
1 TABLET TABLET ORAL
Refills: 0 | Status: ACTIVE | COMMUNITY

## 2025-01-21 NOTE — HPI-TODAY'S VISIT:
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  He got a new [employer] phone.  Jan 13 labs show total protein 7.4 and alb 4.0 and tb 3.6 and direct 2.80 and alk 591 and ast 113 and alt 95. Jan 6 recent lavbs showed tb 3.6 (same) and alk 609 little higher) and ast 114 and alt 94 so fairly close.  No new meds.  Hoping that missing some other medicine pushing this up and will settle.   January 6 labs show INR normal at 1.1 and prothrombin time slightly up at 12.4 from 12.1. Glucose was up again at 116 and please share with local providers. It was also elevated at 112 back in September. BUN of 13 creatinine 0.73 sodium 141 potassium 4.6 calcium 9.7 albumin 3.8. Your bilirubin was up a little higher at 3.6 from 2.2. Alk phos was higher at 609 from 373 AST up to 114 from 89 and ALT up to 94 from 74 so definitely a little bit an upward trend seen there. Have you been ill recently? Any new medicines? Very suspicious that you are taking something new as your labs have been very relatively stable for you for a while now. WBC 3.6 hemoglobin 13.3 platelet count up to 141 from 117 still low though. MCV 92. Neutrophils 1.5 and lymphocytes 1.5. That is a little bit higher 40 which makes me wonder if you were ill recently.   September 24, 2024 labs show INR normal at 1.1 and prothrombin time elevated at 12.1. Glucose was elevated 112 and previously had been 108. Please share with local providers. BUN of 15 creatinine 0.79 sodium 140 potassium 0.7 calcium 9.4 albumin 3.8. Bilirubin slightly higher at 2.2 and alk phos slightly lower at 373 and AST 89 and ALT of 74 previously AST 84 ALT of 71. These are similar. WBC slightly low at 3.2 and was normal before. This can vary sometimes. Hemoglobin 13.9 and platelet count was a little higher at 117. MCV was 96. Neutrophils were a little low at 1.2 but up from 1.1. Lymphocytes were normal at 1.4.   Sept 5 u.s: states visualization of the liver mildly limited due to its high position under the costal margin. No hepatic abnormality seen. Liver slightly nodular surface contour and compatible with hx of cirrhosis. Liver was mildly heterogeneous and small 1cm cyst at the left lobe. Small ascites seen near the liver. Important to stay low salt diet. Pancreas normal where seen. Gallbladder no obvious gallstone or polyp. Common duct not seen due to gas. Rigth kidney 7mm probable calcification at the mid kidney and peripheral 7x3.5cm simple cyst and felt similar to prior. Adjacent simple cyst 1.2cm simple cyst. No hydronephrosis. Left kidney 1.6cm cyst seen mid kidney and faintly delineated and 1cm parapelvic cyst. 6mm parapelvic cyst. No hydroneprhsois. Spleen mildly enalrged 13.1cm and similar to prior. Vessels patent but gas limtied exam of the right and left hepatic veins, Aorta moderate atheroslcerotic changes and felt unchanged and important to watch cholesterol lipids. Overall cirrhotic liver and 1cm cyst seen and no other issues, Small ascites seen and need to stay low salt diet as salt can lead to more fluid formation. Mild splenomegaly and bilateral renal cysts and one renal cyst was not as well seen.    July 15 labs show glucose down to 108 from 115 and prior 136.  BUN of 15 creatinine little bit higher at 0.96 from 0.8. Many people lately have been running a little bit higher due to the increase in heat on their hydration and kidney function. Working on your hydrational status with the heat may help with that to be back to usual. Please share with local providers. Sodium 141 potassium 4.6 calcium 9.4 albumin 3.8 and these are normal. Bilirubin remains a little lower at 1.9 from prior January 2.2. Alk phos a little lower at 405 from 461 and 483. AST lowered at 84 from 95 and prior 96 ALT down to 71 from 90 and prior 117. Is or anything different that you are doing that could be dropping this? Says he was taken off the vitamin d3 and stopped mvi.  WBC 3.6 hemoglobin 13.2 plate count 108 which is a little low but up from 106 back in April. MCV normal 94. Neutrophils remain a little low at 1.1 from 1.3 and lymphocytes normal at 1.8. INR normal at 1.1.  2 new drugs approved pbc and headed to a meeting to learn more and we will see. These have less itching that the ocaliva does. They have less of the itching induction.  Last imaging feb and says u.s was rebooked and rebooked sept at [facility]>  April 10 labs show glucose elevated at 115 but down from 136 in January. BUN of 15 creatinine 0.8 sodium 141 potassium 5.1 calcium 10.0 albumin 3.8 and these are stable for you. Bilirubin was down to 1.9 from 2.2 so that was good to see. Alk phos also lower at 461 AST slightly low at 95 and ALT slightly lower at 90. Need to keep following these labs for trends. Good to see them at least  not going up anymore like they did back in January. WBC 3.4 hemoglobin 14.2 platelet count 106 a little bit down from 127. Neutrophils and lymphocytes were checked with a neutrophils low at 1.3 and lymphocytes normal at 1.6.  DId the knee surgery was late sept 2024 and he did well. He is moving more and on therapy and that will help. 3- Wrote for knee surgery Vocal-Melcroft score using these labs: 30 day mortality: 0.9% 90 day mortality: 2.2% 180 day mortality: 4.1% 90 day decompensation 5.5%. Estimated Meld was 9.0 and meld na 9.0 as no recent INR.  Local [facility] imaging ultrasound from February 13 2024 was sent in to us. Unclear why we had not received it until now. Liver had a nodular surface contour and a heterogeneous appearance of the liver parenchyma.  No discrete mass was seen.  Portal vein appeared patent with appropriate directional flow. Pancreas was normal were seen. Gallbladder showed no evidence of inflammation or sludge.  Negative Wick sign.  Normal wall thickness was seen.  6 mm gallbladder wall echogenic focus was seen. Common bile duct showed no evidence of duct dilation. Right kidney showed no hydronephrosis was 10.6 cm.  You had a 5.8 cm simple appearing cyst and an 8 mm echogenic focus. Left kidney showed no hydronephrosis and was 11 cm with a 1.1 cm simple cyst seen.  Please share with local providers. Spleen was mildly enlarged at 13.1 cm. In regards to your aorta, you do have some signs of scattered atherosclerotic plaque.  Aorta measured up to 2.5 cm.  Important to share with your primary provider so that they can follow your cholesterol issues in regards to this. Liver vessels were patent. Splenic vessels were patent. In summary, they felt that you had a cirrhotic appearing liver without any definite liver lesions and mild splenomegaly.  They suspect that you may have a 6 mm possible gallbladder polyp versus adherent stone and a possible 8 mm right kidney nonobstructive calculus versus echogenic area. I went back and looked at your prior ultrasound from last year and it had suggested that the gallbladder polyp at that timeframe was 3 mm in size and said it was a possible polyp versus stone. We need to talk about considering doing a follow-up ultrasound in 3 months to check if the gallbladder echogenic focus continues to grow and if so, we need to talk with surgery about considering a gallbladder surgery for that if it indeed continues to grow.  May be due for egd with Dr Vann as done usually every 2 yrs.  June 9, 2022 EGD was sent in by Dr. Vann' office.  She actually called me on Friday late after office closed to get the fax number and I advised her to have them call Sweta today to get the monitored fax line for urgent faxes. I also asked Sweta to call her office  today, to give her the exact fax that could be monitored and that led to an almost immediate receipt of the report which I was very happy to get. In that report she states that your duodenum was normal but you did have diffuse moderate inflammation characterized by congestion/edema with erosions and erythema in the gastric body and gastric antrum and that she did biopsies with cold forceps for this. She is that you had a small hiatal hernia with some grade B esophagitis which means some irritation of the esophagus. 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 She recommended a repeat endoscopy in 3 to 5 years, but I would recommend given your clinical course, that it be instead considered to be repeated in 2 years instead because of the fact that you had grade 1 varices and we need to see if those are getting larger or not with your liver disease issues staying on higher labs.  We can discuss this at your upcoming visit and confer also with Dr. Vann considers about this. My main concern is that the liver labs are remaining up and so I just want to make sure that this is not getting any worse in the interim.   January 16 labs show sugar slightly up at 136 and unclear if you were fasting or not? BUN of 13 creatinine 0.81 sodium 141 potassium 4.7 chloride 102 and CO2 of 26 and these are normal. Your calcium was slightly up at 10.3 with normal being from 8.6 up to 10.2. Have you been taking any calcium or vitamin D supplements lately? Not on vitamin supplements but eating little more. Albumin was 4.2 normal. Bilirubin slightly higher at 2.2 from 1.6 and alk phos 483 from 442. AST up slightly to 96 and ALT also up to 107. Any recent illness or other issues that may have caused the labs to fluctuate up versus your prior labs? WBC 3.9 hemoglobin 14.5 and platelet count slightly lower at 127 from 132 before. MCV 90. Neutrophils were normal at 1.5 and lymphocytes 1.8 which would suggest less likely for you to have been ill recently.  No recent illnes. Says exercise is less due to weather.  10/16/23 telemed  Oct 10 labs show wbc 3.9 and hg 13.8 and platelets 132 little lower and prior normal 159. Mcv normal 90. Neutrophils 1.3 little low and lymphs 1.8 normal. Glucose 124 elevated and prior 124 also. Were you fasting this day? Bun 16 and cr 0.85 and stable.Na 140 and k 4.4 and cl 103 and co2 26. Ca 9.8 and alb 3.8 and tb 1.6 little lower from 1.8.Alk 442 little lower from 496. Ast 83 and alt 81 and both down from prior 108 ast and alt 98.  October 9, 2023. Also saw that i was listed as your ordering provider for this (I do not see that we have ordered this) and it states that you have no evidence of any abdominal aortic aneurysm.  August 30 labs show albumin stable at 4.1, bilirubin down to 1.8 from 2.0. Direct bilirubin 1.1. Alk phos 456 slightly up from 432 in May. AST 85 down from 105. ALT 82 down from 103 in May. Glad to see that that this is starting to look like it is coming down lets see what the trend is.  July 10 2023 and was reviewed by Dr. Hopkins per the note. And mentioned that you had osteopenia with your spine score being -0.7 the left hip -1.3 and the left femoral neck being -1.6. There is a suture fracture risk for the hip is 0.7% and major osteoporotic fracture risk is 2.5% over the next 10 years.  July 10 2023 ultrasound shows liver coarsened in its echotexture with blunting of the liver edges and mildly nodular contour.  No lesions seen. No dilated bile ducts seen and common bile duct 4.6 mm. Small gallbladder polyp seen measuring 3 x 3 x 3 mm.  Wick sign negative. Of note, prior u.s in Jan 2023 did not show this and so we need to follow this over time. Typically the concern is if this is a gallbladder polyp and not a stone is if it grows to 10 mm or more in size. Pancreas not seen due to overlying structures. Right kidney 10.4 cm with no hydronephrosis but with a simple right renal cyst measuring 6.4 x 6.7 x 3.9 cm that was previously 6.5 x 4.0 x 5.5 cm.  Punctate echogenic focus seen in the mid right kidney measuring 0.5 x 0.7 x 0.4 cm favored to be a stone. Left kidney 10.6 cm with no hydronephrosis. Spleen 13.2 cm. Liver vessels were patent which was good to see. In summary, they see chronic liver disease but without any sonographically evident lesions and patent vessels. They feel that this is a small gallbladder echogenic focus measuring 3 mm that they think is a pedunculated polyp versus less likely a stone. We will need to monitor this and look for any changes over time.  July 6 labs show lipase normal at 41, INR normal at 1.0, glucose was elevated at 124 down from 135. BUN of 15 creatinine normal 0.85 sodium 138 potassium 4.4 calcium 9.8 albumin 3.9. Bilirubin slightly higher at 1.8 from 1.7.  Alkaline phosphatase 496 up slightly from 491.   from 98 and ALT 98 from 94. WBC 3.9, hemoglobin 13.7 platelet count 159 MCV 92.  Neutrophils 1.6 and lymphocytes 1.5.  Sadly for these labs our only option would be to consider trying the Ocaliva again which had side effects for you versus continuing to wait for new or medicines for PBC to come out which may be next year or the following.  There is a medicine from [place] that they are trying to get approved.  That would be the next 1 to come out.  June 8 labs show sugar elevated at 135 and previously 119.  Have you been fasting when you do these labs?  There clearly are remaining elevated. BUN of 13 creatinine 0.756 sodium 141 potassium 4.6 chloride 106 CO2 of 25 albumin 3.9. Bilirubin slightly higher at 1.7 from 1.6.  Alk phos slightly higher at 491 from 452.  AST up to 98 from 87 and ALT up to 94 from 88.  Unclear to me if the sugars could also be making the liver labs be trending higher?  Have you done a hemoglobin A1c with primary provider to see what that shows? WBC 3.9 hemoglobin 13.6 platelet count slightly low at 147 MCV 92 and neutrophils 1.6 and lymphocytes 1.6. I think that looking at and clarifying the issue as to your need for getting the sugars to be optimal may be a reasonable issue to focus on for you and seeing what that does may help lower these. It is known that  sugars if not controlled can impact many other liver diseases.  May 10 labs show albumin 4.1, bilirubin elevated 2.0 and was 1.37 on the direct.  Alk phos was 452 slightly lower.  AST was slightly higher at 105 from 93 and ALT was 103 up from 98. Called pt to see if he is on a new meds. He denies any herbs or teas. No illness. No antibiotics. No nsaids. Not eating any excess of any vegetable supplements. Wt  was 77.5 kg and so now 1165 is max 1000 alternate 1250mg a day to get to dose that fits weight now.  Sweta was able to obtain your ultrasound and gave it to me, which was dated January 9, 2023, and it mentions that your liver had increased geographic echogenicity with macro and micronodular contour but no definite lesions. Liver showed no dilated bile ducts and common bile duct 3.4 mm. Gallbladder normal. Pancreas normal. Right kidney measured 9.5 cm with normal echogenicity and no hydronephrosis and simple right renal cyst measuring 6.5 x 4 cm and was previously 6.4 x 6 cm.  This was better evaluated on the more recent MRI.  Nonshadowing echogenic focus seen in the mid renal pole measuring 1.4 x 1.1 cm and felt likely corresponding to renal sinus fat. Left kidney measure 11.4 cm with normal echogenicity. Spleen was normal at 12 cm. Liver vessels were patent. Given this you need to repeat the scan in 6 months which would be then in the July timeframe. We will put in the order for this to be done in July for you.   April 7 labs show sugar still elevated at 119 but down from 131 December.  Please share with primary provider.  BUN is 17 creatinine 0.77 which is actually better than in December when it was 0.89.  Both however are normal range. Sodium 141 potassium 4.8 and chloride 101 which is normal. Your calcium was up at 10.5.  Are you taking a calcium and vitamin D supplement?  If you are that sometimes can cause this.  Please share with primary provider as this was normal before at 10.2 and prior to that 9.7. Asked pt and he is on vit d and regular vitamin. Asked him to check with primary re that as his calcium was high. Albumin normal at 4.1. Bilirubin slightly higher this time at 1.6 and previously 1.1.  Alkaline phosphatase was higher at 452 from 423 and AST was higher at 87 and ALT of 88 from prior levels. Asked pt if he was ill and says has been a bit stressed as son ill.  March 7 labs show albumin stable at 4.5 and actually rising which is good to see.  Bilirubin down to 1.1 from 1.3 with a direct 0.68.  Alk phos slightly higher at 470 from 427 AST and ALT slightly higher at 93 and 98 from previous AST of 84 and ALT of 82. Not really see in the labs drop yet and still slightly going up.  Again just confirming no new meds or herbal remedies?  Please let us know.  February 6 labs show albumin 4.4 which is normal and in fact a little higher for you.  Total bilirubin still 1.3 as before on January 9.  Direct bilirubin 0.68.  Alk phos went up a little to 427 from 400 and AST 84 and ALT 82 with previous AST 63 and ALT 69.  Egd from June 9 from Dr Vann. Told not due yet. Gastric body and gastric antrum with congestion, erosions, and erythema. Duodenum was normal. Small hiatal hernia present. LA grade B esophagitis seen in esophagus. 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 They mentioned to redo in 3-5 yrs? They ordered pantoprazole 40mg po qd.  December 21 2022 labs show INR normal at 1.0 which is good to see.  Is actually lower than back in September when it was 1.1. Sugar was elevated at 131 and as we mentioned before that may be related to you doing the labs not fasting.  Please share with primary providers. BUN of 17 creatinine 0.89 sodium 141 potassium 4.8 calcium 10.2 albumin 4.4 bilirubin 1.1 alkaline phosphatase went up a little to 423 from 323.  Any recent issues?  AST was 72 ALT of 70 up from 57 and 49. White blood cell count 4.1 hemoglobin 14.6 platelet count 151 MCV 98 neutrophils 2.2 lymphocytes 1.3. Called pt: took a medicine for his knees: meloxicam he took and has 8% risk to raise the labs. So that is what did that.  He did stop that.   September 19 labs show white blood cell count 3.6 hemoglobin 14.9 platelet count 145 down from 164.  Normal is from 150 up to 450.  1 year ago and September 3, 2021 the platelet count was about the same at 144. Neutrophils 1.6 lymphocytes 1.4 both normal.  Sugar was slightly up at 148 from previous 137.  BUN of 12 Cr 0.68.  Sodium 143 potassium 4.2 albumin 4.2 bilirubin normal at 0.6 and down from 0.9. Alkaline phosphatase down from 404 to 323.  AST slightly lower at 57 from 58.  ALT down to 49 from 58.  So they are moving in the right direction. INR normal at 1.1. Meld 7 and meld na 7 so remains low.  June 20 labs showed sugar elevated at 138.  BUN of 14 creatinine 0.79 sodium 140 potassium 4.6 chloride 102 CO2 27. Albumin 4.0 bilirubin 0.8 down from 1.1.  Urine bilirubin 0.38 down from 0.43.  Alkaline phosphatase 319 from 298 so it went up slightly.  AST went down to 45 and ALT to 46 from previous AST 60 and ALT 60. Overall the liver labs and the AST and ALT are lower and the alk phos slightly higher.  July 9 MRI sent in to me. Lower thorax shows minimal bibasilar atelectasis. Liver showed no significant fat or iron but does have chronic liver disease changes seen including lobar redistribution and nodular contour.  There are some reticular enhancement changes that are compatible with fibrosis.  No suspicious liver lesions seen.   Liver vessels are patent as expected. Small varices seen near the stomach and spleen.  Important to stay on top of egd surveillance for these issues. Recannulized periumbilical vein noted which is another sign of portal hypertension Spleen slightly enlarged at 13 cm. Pancreas and adrenal glands normal. Kidneys show some renal cysts and no further description was given. They did see your appendix and it appeared normal to them. They also saw some mild atherosclerotic disease of the abdominal aorta but without aneurysm.  Please share with primary providers to be aware of same. Degenerative changes seen of your spine. We may be able to look towards doing an ultrasound alternating with an MRI and we will discuss this further at your next visit.   March 15 labs show glucose elevated at 137 previously 143 and please share with primary provider.  BUN of 14 creatinine 0.86 sodium 139 potassium 4.8 calcium 10.0 albumin 4.2 bilirubin 0.9.  These other labs are normal range. Alkaline phosphatase did go up to 404 from previous 394 and prior 327.  AST came down from 63 to 58.  ALT came down from 68 to 58. Hill cell count 4.1 hemoglobin 14.9 platelet count 164 normal.  MCV 89. Neutrophils 1.4 and lymphocytes 1.9.  Ocaliva was on it had toxicity risk and not much lab benefit and being cirrhotic is higher risk and he is  getting older.  January 29 2022 MRI Lower thorax showed bibasilar atelectasis which means that the bases of the lungs were not fully expanded.  So metimes we can see that when you are in the MRI machine in that fixed position for a while.  Please share with primary provider. Liver showed no fat or iron but did have chronic liver disease changes including a nodular contour and lobar redistribution.  Some fibrosis changes seen.  No suspicious lesions.  Small left lobe hepatic cyst seen. Gallbladder normal with some unchanged mild intrahepatic bile duct dilation most pronounced in the periphery. Spleen was top normal in size at 13 cm. Pancreas, adrenal glands, and lymph nodes were normal. Stable renal cyst seen bilaterally. Mild atherosclerosis of the aorta seen without aneurysm.  Small varices near the esophagus and stomach so would need to stay on top of that EGD surveillance. Mild degenerative changes seen of the spine.  He did the covid 19 series and March 5 2021. He did the covid booster.  Meds off ocaliva for summer 2020.   November 30 labs show INR normal at 1.0 which is good to see.  Glucose currently 154 elevated and previously was 143 and prior to that 129.  All 3 were elevated.  We have discussed this previously.  Please share with primary providers per their review. BUN of 13 creatinine 0.84 sodium 142 potassium 4.8 chloride 102 CO2 of 27 calcium elevated at 10.3. Asked if he is on any calcium supplements?  he said not taking any that he knows. Previously was normal at 10.2 and prior 10.1.  Please share with primary provider also.  Albumin 4.5 bilirubin 1.0 which is normal.  Alkaline phosphatase elevated at 394 previously 327 and prior 309.  AST 63 and ALT 68 which appeared to be slightly higher from prior AST of 47 ALT 45. Asked if he had any new meds and he says he is a vit d supplement. White blood cell count 4 hemoglobin 14.8 platelet count slightly low at 146 but improved from last time at 144.  MCV normal at 90.  Neutrophils 1.5 and lymphocytes 1.7. Meld low at 6 and meld na 6.    September 3 labs show INR remains perfectly normal at 1.0 and was previously 1.1. Glucose still remains elevated at 143 previously 129 and share with primary provider.  BUN of 13 creatinine 0.84 sodium 140 potassium 4.5 calcium 10.2 albumin 4.1 bilirubin 1.0.  Previously bilirubin 0.7 but remains normal again at 1.0.  Alkaline phosphatase slightly up at 327 from 309 previously 355.  AST down to 47 from 55 from prior 63.  ALT 45 down from 51 and prior 59 so those continue to drop.  White blood cell count 4.3 hemoglobin 14.9 platelet count 144 which is slightly lower from 162.  MCV 90.  Neutrophils normal at 1.4. Meld remains low at 6 and meld na 6.  July 10 MRI shows the lower thorax to have bibasilar atelectasis. Liver shows morphologic changes of chronic liver disease with nodular contour and lobar redistribution.  They do see changes in the liver parenchyma that are suggestive of fibrosis.  More confluent fibrosis seen in the right lobe. No definite suspicious liver lesions seen.  You do have some perfusion anomalies which need to be rechecked in 6 months.  Scattered hepatic cysts are seen. Gallbladder was unremarkable and mild intrahepatic bile duct dilation was seen also more conspicuous in the right lobe versus the left.  This is felt to be compatible with your PBC diagnosis.  No extrahepatic bile duct dilation seen. Spleen top normal at 12.9 cm with small splenule in the left upper quadrant. Pancreas normal. Renal cysts were seen. Colon diverticulosis was noted as well. No inflammation however was seen in the colon. Mild paraesophageal perigastric and perisplenic varices were seen and moderate aortobiiliac atherosclerosis seen. Overall they felt you had signs of chronic liver disease but no evidence of any malignancy.   June 9 laboratories show white blood cell count 4.3 hemoglobin 13.8 platelet 162.  These are stable for you.  Platelet count is actually better than it was before at 151.  Neutrophils are stable at 1.6 and previously were 1.5.  Total bilirubin is down to 0.7 from 0.8.  Alkaline phosphatase is lower at 309 from 355.  AST down to 55 from 63 ALT 51 down from 59 and prior to that 68.  So the liver labs continue to be dropping.  Sodium 139 potassium 4.6 chloride 102 CO2 26 BUN 17 creatinine 0.93 glucose 129.  Sugar is actually lower than the previous time at 146. INR 1.1.   Meld score low at 7 and meld na 7.  April 27: alb 4.3 and tb 1.1 and db 0.43 and alk 298 and ast 60 and alt 60.  March 2: alk 355 and ast 63 and alt 59 so in fact alk phos lower now to 298 and ast and alt about the same.  8 weeks off labs March 2 and inr 1.0 and tb 0.8 and alk 355 and down from 376 and prior 392. ast 63 and alt 59 and down from 65 and 68 so little lower.  ca 10.0. na 140 and k 5.0 and glu 146 elevated. bun 18 and cr 0.9. wbc 3.9 and hg 14.9 and plat 151 and mcv 90.   4 weeks off labs: jan 25 2021 and inr normal 1.0 and so little lower now. tb 0.8 and lower from 0.9 and alk 376 from 392 so lower and ast 65 and alt 68 and prior ast 60 and alt 68 so not much of a change seen. na 143 and k 4.6 and cl 103 and co2 23 and cr 0.93. glu 128 elevated and mentioned to him. defer to local md re your sugars. cr 0.93. wbc 4.1 hg 14.5 and plat 138 (down from 165) and mcv 89.  Jan 23 mri: Morphologic changes of chronic liver disease without  hepatocellular carcinoma seen so that is good to note. Patent portal venous system with stigmata portal hypertension, including upper abdominal varices and splenomegaly noted so need to stay on top of egd surveillance. Right renal cyst with area of complexity on prior ultrasound demonstrates no septation, enhancement, or nodularity on this examination, compatible  with a simple cyst ( 4.7 x 6.2 x 7.8 cm.) This may have represented artifact on prior ultrasound. Continued attention on follow-up for liver disease was recommended. Liver showed no fat or iron. Scattered simple cysts in the left hepatic  lobe. Periesophageal, perigastric, perisplenic varices seen. Mild intrahepatic duct dilatation peripherally extending to the capsule, greater in the right hepatic lobe, compatible with primary biliary cirrhosis. No extrahepatic biliary ductal dilatation. Normal gallbladder. Spleen was enlarged measuring 13.1 cm in the craniocaudal dimension.Adjacent splenule. Pancreas normal. Mild atherosclerotic disease abdominal aorta without aneurysm. Mild degenerative changes of the spine.  Document Type: MRI Abdomen w/ + w/o Contrast Document Date: January 23, 2021 09:35 Document Status: Auth (Verified) Document Title: MRI Abdomen w/ + w/o Contrast Performed By: Jason Kapadia Verified By: Jason Kapadia on January 25, 2021 07:35 Encounter info: [id number], Formerly [facility], [facility], Single Visit OP, 1/23/2021 - 1/23/2021 * Final Report * Reason For Exam PBC//HEPATIC FIBROSIS//DIABETES//FATTY LIVER//RENAL CYST//ELEVATED ALKALINE PHOSPHATASE LEVEL REPORT EXAM: MRI Abdomen w/ + w/o Contrast CLINICAL INDICATION: PBC//HEPATIC FIBROSIS//DIABETES//FATTY LIVER//RENAL CYST//ELEVATED ALKALINE PHOSPHATASE LEVEL. TECHNIQUE: Multisequence, multiplanar MRI of the abdomen was performed without and with intravenous contrast. ESRC.2.7.3 CONTRAST: 16 cc of Prohance COMPARISON: Outside MRI dated 9/1/2020. Abdominal ultrasound dated 8/10/2020. FINDINGS: Lower Thorax: Normal. Liver: No fat or iron. Morphologic changes of chronic liver disease, including lobar redistribution and nodular contour. Delayed reticular enhancement of the hepatic parenchyma, compatible with fibrosis. More confluent fibrosis in the right hepatic lobe. Scattered simple cysts in the left hepatic lobe. No hepatocellular carcinoma. Hepatic vasculature is patent. Recannulized paraumbilical vein. Periesophageal, perigastric, perisplenic varices. Gallbladder/Biliary Tree: Mild intrahepatic duct dilatation peripherally extending to the capsule, greater in the right hepatic lobe, compatible primary biliary cirrhosis. No extrahepatic biliary ductal dilatation. Normal gallbladder. Spleen: Enlarged measuring 13.1 cm in the craniocaudal dimension. Adjacent splenule. Pancreas: Normal. Adrenal Glands: Normal. Kidneys/Ureters: Renal cysts. Right renal cyst with area of complexity on prior ultrasound demonstrates no septation, enhancement, or nodularity on this examination and measures 4.7 x 6.2 x 7.8 cm. Gastrointestinal: No bowel obstruction. Lymph Nodes: Normal. Vessels: Mild atherosclerotic disease abdominal aorta without aneurysm. Peritoneum/Retroperitoneum: Normal. Bones/Soft Tissues: Mild degenerative changes of the spine. IMPRESSION: 1. Morphologic changes of chronic liver disease without hepatocellular carcinoma. 2. Patent portal venous system with stigmata portal hypertension, including upper abdominal varices and splenomegaly. 3. Right renal cyst with area of complexity on prior ultrasound demonstrates no septation, enhancement, or nodularity on this examination, compatible with a simple cyst. This may have represented artifact on prior ultrasound. Continued attention on follow-up for liver disease. Signature Line *** Final ***  Electronically Signed By: Jason Kapadia on 01/25/2021 07:35 Dictated by: Jason Kapadia  2 weeks off ocaliva: Sanju 15: glu 117 elevated some but similar to nov 117 but down from 204 in dec 30. bun 16 and cr 0.91. na 141 and k 4.7 and cl 102 and ca 10.1 and alb 4.2 and tb 0.9 down from 1.1 in nov. ast 60 and alt 68 and so about the same as you can see vs other two labs and alk 392  about the same vs dec but little up from nov 354. wbc 4.1 hg 14.8 plat 165. inr 1.1 stable. So no dramatic changes seen yet that point.  Dec 30 2020 labs in middle: wbc 3.9 hg 15.1 plat 167. mcv 97. tb 0.9 down  from 1.1 and alk 399 slightly higher from 354. ast 64 and alt 66 and prior ast 63 and alt 60. na 139 and k 4.7 and cl 99 and co2 27. glu 204 elevated so that is newer issue as prior 117 and 134 so show to local md. bun 15 and cr 1.07 little up and prior 0.85 so little dry and could be linked to the sugars.  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  He is on urosodiol 500mg BID and had been on for years Ocaliva 10mg q other day due to pruritis and then off due to pruritis.  He weighs 183. Max dose 1100 to 1250 range 13-15mg/kg and we can ursodiol to 1.5 in the am and 1 in the evening. that may help and will avoid something more risky.  Itching much better on the gabapentin and off the ocaliva.  Nov 25 2020: wbc 4.2 hg 15 and plat 150 and mcv 90. Neutrophils 1.3 and prior 1.4 and lymphs 2.0 and prior 1.9. tb 1.1 and alk 354 and ast 63 and alt  60 and prior tb 0.9 and alk 343 and ast 55 and alt 61. So about the same. glu 117 and down from 134. bun 15 and cr 0.85 and na 139 and k 4.4 and cl 101 and co2 25. alb 4.2 normal.  Saw local mri: 9-1 20: nonenhancing renal cyst and no definite renal mass. Largest cyst right kidney 5.7cm. Not surprisingly they thought liver appeared to be cirrhotic. Also apparent nonenhancing cysts in the liver up to 10.6mm. Nonspecific biliary dilation in liver. Extrahepatic duct appears decompressed. Nonspecific splenomegaly seen and no significant varices seen. Left adrenal suspected adenoma. Appendix seemed somewhat prominent to then at 6.4mm but was probably similar to prior per them. No definite gallstones. Prior [place] imaging liver coarsened. They recommended mri to better see possible complex renal cyst.  Saw urologist re the kidney issue was stable. They were to see him again in feb 2021 and had been changed.  The patient relates no significant family or personal history of liver disease. He states no history of new medications or alcohol use. The  patient reports a personal history of no other habits that could cause liver damage.  July 2020: tsh very low and show local md. T4 normal 8.5. inr 1.1 and tb 0.9 and alk 343 and ast 55 and alt 61 and tb 0.9 and alb 4.2 and ca 10.6 elevated and show local md also. na 144 and k 4.8. glu 134 elevated and maybe not fasting. cr 0.83. wbc 4.1 hg 15.3 plat 170.  Prior April ast 67 and alt 66 and alk 362 and tb 0.9 so liver labs little lower this last time despite the low dose and alk little lower also. calcium prior 10.1 and is not on any calcium supplements.  He says local doctor following thyroid and says he is doing better.  4- wbc 4.0 and hg 14.3 and plat 155 and neutrophils 1.2 little low. glu 130 and cr 0.78 and na 140 and k 4.4 and cl 101 and co2 22 and alb 4.3 and tb 0.9 and alk 362 and ast 67 and alt 66 and inr 1.1.  2-17-20 and wbc 4.1 hg 14.5 plat 153 and neutrophils 1.3 and glu 143 and cr 0.96 and na 140 and k 4.3 and cl 100 and co2 25 and alb 4.0 and tb 0.8 and alk 352 and ast 66 and alt 61 and inr 1.0.  12-16-19 and wbc 3.6 and hg 14.3 and plat 184 and neutrophils 1.3 and glu 124 and cr 0.86 and na 142 and k 5.0 and cl 102 and cl2 25 and alb 4.3 and tb 0.9 and alk 353 and ast 70 and alt 67 and inr 1.0.  Document Type: US Abdomen Doppler Complete Document Date: August 10, 2020 10:04 Document Status: Auth (Verified) Document Title: US Abdomen Doppler Complete Performed By: Jack Martin Verified By: Candelaria Rodriguez on August 10, 2020 11:41 Encounter info: [id number], Formerly [facility], [facility], Single Visit OP, 8/10/2020 - * Final Report * Reason For Exam primary billary cholangitis REPORT EXAM: US Abdomen Doppler Complete, US Abdomen Complete CLINICAL INDICATION: Primary billary cholangitis. TECHNIQUE: Grayscale, pulsed wave and color Doppler sonography of the upper abdomen were performed. ESRC.3.7.1 COMPARISON: None FINDINGS: Liver: Coarsened echotexture. No lesions. Bile Ducts: No dilated intrahepatic biliary radicles. Common duct measures 4 mm. Gallbladder: Normal. Wick's sign is negative. Pancreas: Partially obscured by overlying structures. Right Kidney: Measures 11.4 cm. Normal echogenicity. No hydronephrosis. 6.4 x 5.9 x 5.3 cm inferior pole cystic lesion with internal thickened septation versus areas of nodularity, indeterminate. Hyperechoic nonshadowing cortical focus measuring 0.5 x 0.4 x 0.2 cm.. Nonshadowing hyperechoic focus near the corticomedullary junction measuring 1.1 x 0.8 x 0.6 cm. Left Kidney: Measures 10.6 cm. Normal echogenicity. No hydronephrosis. Spleen: Measures 12.2 cm. Aorta: Normal caliber where imaged. IVC: Normal proximally, not imaged distally. Hepatic Veins: Normal. Portal Veins: Hepatopetal flow. Velocity of 27 cm/s in the main portal vein, 25 cm/s in the right portal vein, and 20 cm/s in the left portal vein. Hepatic Arteries: Peak systolic velocity 115 cm/s. Resistive index 0.77. Other: None. IMPRESSION: 1. Complex cystic lesion within the right kidney with thickened septation/areas of nodularity. Recommend MRI for further evaluation to exclude enhancing/solid components. Nonshadowing echogenic foci in the right kidney may represent nonobstructing stones although underlying lesions cannot be excluded. This can be evaluated at the time of MRI. 2. Coarsened hepatic echotexture can be seen with hepatic steatosis or chronic liver disease. No intra or extrahepatic biliary ductal dilation. Patent hepatic vasculature. The images were reviewed and interpreted by Candelaria Rodriguez MD. Signature Line *** Final *** Electronically Signed By: Candelaria Rodriguez on 08/10/2020 11:41 Dictated by: Jack Martin  12- u/s coarse hepatic ehcotexure and consistent with cirrhosis and 1.2cm hepatic cyst. Patent vessels and right renal cysts up to 5.7cm and some nonobstructing right renal calculi. Spleen 12.3cm.  Oct 14 2019 wbc 3.8 and hg 14.4 plat 177 and neutrophils 1.3 little low and glu 123 and cr 0.74 and na 141 and k 4.5 and cl 100 and co2 25 and alb 4.3 and tb 0.9 and alk 336 ast 67 and alt 61. hep b immune 40.5  Sept 18 2019 na 140 and k 4.7 and cl 103 and glu 121 and bun 12 and cr 0.79 alb 3,8 and tb 0,9 and ca 9.6 and ast 49 and alt 48 and alk 334 and a1c 5.7 and chol 228 and trg 52 and hdl 86 and ldl 132 nd psa 0.01 and wbc 4.2 and hg 144 and plat 183.  June 19 2019 and liver midly coarse and 1cm hepatic cyst and stable and gb not distended and no stones and bile ducts not dilated and spleen stable 12.3cm abd right kdiney 11cm and several right kidney cysts up to 6.2cm. Saw stone sin the right kidneuy. No hydronephrosios. Vessels patent. No change vs 2018.  Dec 2018 u.s with liver appearing fatty and patent vessels. Right kidney cyst 6.1x4.7x5.3cm stable abd simple. Liver cyst 1.1x0.8x1.1.cm. Similar to prior scan.  April 2019 labs wbc 4.3 hg 14.7 plat 183 and glu 134 and cr 0.85 and na 141 k 5.1 and tb 1.0 and alk 446 and ast 85 and alt 91.  Feb 2019 alk 418 and ast 79 and alt 81.  Dec 2018 alk 440 and tb 0.9 and db 0.48 and ast 76 and alt 92. Liver 76% and bone 20% and intestine 4%.  11/26/2018: wbc 4.6, hgb 14.7, plts 175,000, gluc 130, vet 0.89, na 142, k 4.0, alb 4.4, frances 2.9, t.bili 1.0, , AST 75, ASLT 80,INR 1.0, TSH 2.26  9/04/2018: wbc 4.6, hgb 14.4 plts 166,000, gluc 130, creat 0.95, na 143, k 4.5, alb 4.3, frances 3.1, t.bili 0.8, , AST56, ALT 62, INR 1.0, TSH 2.17,  6/05/2018: HBsAb 4.5, HBsAg neg, HBcAb neg, HAV pos  6/20/2018: liver echogenic suggesting mild fatty infiltration, simple cyst 1.1 x 0.7 x 1.3,, gallbladder unremarkable, mpv patent, cbd 4mm, right kidney simple cyst 5.6 x 4.8 x 5.2 cm, echoegenic focus 1.3cm consider kidney stone.  His cholesterol is checked by Dr. Sachin Dimas. He says cholesterol has been ok.   Reminded pt re bone density and needs to be following locally and he has not done this and reminded to get with local provider.  11-13-19: spine normal and t score 0.1 and z score 0.1/ femur neck -0.9 and -0.4 and femur total -1.0 and -0.9. He shared with primary md. he will check with primary md to redo as been 2 yrs.  Plan: 1. New meds out but question is is his disease too advanced. 2. Seeing if the labs settle post his knee replacement. 3. Pt hopefully will settle down. 4. Do labs in 1m and 3m.  Duration of the visit was 30   minutes with 10 minutes of chart prep and 20 minutes by dox telemed video with time spent reviewing historical and recent records, discussing theircurrent status relative to same and reviewing future plans for the patient.

## 2025-01-30 ENCOUNTER — TELEPHONE ENCOUNTER (OUTPATIENT)
Dept: URBAN - METROPOLITAN AREA CLINIC 92 | Facility: CLINIC | Age: 85
End: 2025-01-30

## 2025-01-30 ENCOUNTER — LAB OUTSIDE AN ENCOUNTER (OUTPATIENT)
Dept: URBAN - METROPOLITAN AREA CLINIC 92 | Facility: CLINIC | Age: 85
End: 2025-01-30

## 2025-02-11 ENCOUNTER — TELEPHONE ENCOUNTER (OUTPATIENT)
Dept: URBAN - METROPOLITAN AREA CLINIC 86 | Facility: CLINIC | Age: 85
End: 2025-02-11

## 2025-02-11 LAB
ALBUMIN: 4
ALKALINE PHOSPHATASE: 603
ALT (SGPT): 96
AST (SGOT): 124
BILIRUBIN, DIRECT: 2.75
BILIRUBIN, TOTAL: 3.5
PROTEIN, TOTAL: 7.5

## 2025-02-11 NOTE — HPI-TODAY'S VISIT:
Dear Roel Trejo,   Feb 10 labs show total protein 7.5 albumin 4.0 these are normal.  Bilirubin is still running about 3.5 and was 3.6 back in January with direct bilirubin is slightly lower at 2.75 from 2.8 in January.  Your alk phos level remains elevated at 603 from 591 and your AST remains elevated at 124 with an ALT of 96 from previous  and ALT of 95.   As you know right now our options are limited as Ocaliva is not approved for advanced liver disease.  Neither of the 2 new medications either are indicated for advanced liver disease.   Just confirming there are any new medicines you are taking or herbals that could be making this issue.   Could not reach you sending this by letter.    Would ask that you check your labs again in about 2 to 3 weeks to see what the trend is doing and please let us know if there is anything you could be doing differently to cause this.  Thank you. Dr. Longoria

## 2025-02-17 ENCOUNTER — LAB OUTSIDE AN ENCOUNTER (OUTPATIENT)
Dept: URBAN - METROPOLITAN AREA TELEHEALTH 2 | Facility: TELEHEALTH | Age: 85
End: 2025-02-17

## 2025-02-28 LAB
ALBUMIN: 3.9
ALKALINE PHOSPHATASE: 596
ALT (SGPT): 85
AST (SGOT): 105
BILIRUBIN, DIRECT: 2.93
BILIRUBIN, TOTAL: 3.9
PROTEIN, TOTAL: 7.3

## 2025-03-01 ENCOUNTER — LAB OUTSIDE AN ENCOUNTER (OUTPATIENT)
Dept: URBAN - METROPOLITAN AREA TELEHEALTH 2 | Facility: TELEHEALTH | Age: 85
End: 2025-03-01

## 2025-03-03 ENCOUNTER — LAB OUTSIDE AN ENCOUNTER (OUTPATIENT)
Dept: URBAN - METROPOLITAN AREA CLINIC 86 | Facility: CLINIC | Age: 85
End: 2025-03-03

## 2025-03-17 ENCOUNTER — LAB OUTSIDE AN ENCOUNTER (OUTPATIENT)
Dept: URBAN - METROPOLITAN AREA TELEHEALTH 2 | Facility: TELEHEALTH | Age: 85
End: 2025-03-17

## 2025-03-18 ENCOUNTER — TELEPHONE ENCOUNTER (OUTPATIENT)
Dept: URBAN - METROPOLITAN AREA CLINIC 86 | Facility: CLINIC | Age: 85
End: 2025-03-18

## 2025-03-18 LAB
ALBUMIN: 3.9
ALKALINE PHOSPHATASE: 617
ALT (SGPT): 91
AST (SGOT): 115
BILIRUBIN, DIRECT: 2.77
BILIRUBIN, TOTAL: 3.7
PROTEIN, TOTAL: 7.5

## 2025-03-26 ENCOUNTER — OFFICE VISIT (OUTPATIENT)
Dept: URBAN - METROPOLITAN AREA TELEHEALTH 2 | Facility: TELEHEALTH | Age: 85
End: 2025-03-26
Payer: MEDICARE

## 2025-03-26 DIAGNOSIS — Z13.820 SCREENING FOR OSTEOPOROSIS: ICD-10-CM

## 2025-03-26 DIAGNOSIS — R94.5 LIVER FUNCTION STUDY, ABNORMAL: ICD-10-CM

## 2025-03-26 DIAGNOSIS — L29.9 PRURITIC CONDITION: ICD-10-CM

## 2025-03-26 DIAGNOSIS — K76.0 FATTY LIVER: ICD-10-CM

## 2025-03-26 DIAGNOSIS — K74.3 PBC (PRIMARY BILIARY CIRRHOSIS): ICD-10-CM

## 2025-03-26 DIAGNOSIS — K82.4 POLYP OF GALLBLADDER: ICD-10-CM

## 2025-03-26 DIAGNOSIS — E66.3 OVERWEIGHT: ICD-10-CM

## 2025-03-26 DIAGNOSIS — K74.02 HEPATIC FIBROSIS, ADVANCED FIBROSIS: ICD-10-CM

## 2025-03-26 DIAGNOSIS — E11.9 DIABETES: ICD-10-CM

## 2025-03-26 DIAGNOSIS — R74.8 ELEVATED ALKALINE PHOSPHATASE LEVEL: ICD-10-CM

## 2025-03-26 DIAGNOSIS — K74.60 CIRRHOSIS OF LIVER WITHOUT ASCITES, UNSPECIFIED HEPATIC CIRRHOSIS TYPE: ICD-10-CM

## 2025-03-26 DIAGNOSIS — N28.1 RENAL CYST: ICD-10-CM

## 2025-03-26 DIAGNOSIS — M19.90 ARTHRITIS: ICD-10-CM

## 2025-03-26 DIAGNOSIS — Z79.899 HIGH RISK MEDICATION USE: ICD-10-CM

## 2025-03-26 DIAGNOSIS — N20.0 RENAL STONE: ICD-10-CM

## 2025-03-26 DIAGNOSIS — K76.6 PORTAL HYPERTENSION: ICD-10-CM

## 2025-03-26 PROCEDURE — 99215 OFFICE O/P EST HI 40 MIN: CPT

## 2025-03-26 RX ORDER — ATENOLOL 25 MG/1
TAKE ONE TABLET BY MOUTH ONE TIME DAILY TABLET ORAL
Qty: 90 UNSPECIFIED | Refills: 0 | Status: ACTIVE | COMMUNITY

## 2025-03-26 RX ORDER — URSODIOL 500 MG/1
1 TABLET TABLET ORAL
Refills: 0 | Status: ACTIVE | COMMUNITY

## 2025-03-26 RX ORDER — AMLODIPINE BESYLATE 5 MG/1
1 TABLET TABLET ORAL ONCE A DAY
Refills: 0 | Status: ACTIVE | COMMUNITY
Start: 1900-01-01

## 2025-03-26 RX ORDER — URSODIOL 500 MG/1
1 TABLET TABLET ORAL
Qty: 180 | Refills: 1

## 2025-03-26 RX ORDER — FAMOTIDINE 40 MG/1
1 TABLET AT BEDTIME TABLET, FILM COATED ORAL ONCE A DAY
Status: ACTIVE | COMMUNITY

## 2025-03-26 RX ORDER — MIRABEGRON 25 MG/1
1 TABLET TABLET, FILM COATED, EXTENDED RELEASE ORAL ONCE A DAY
Status: ACTIVE | COMMUNITY

## 2025-03-26 RX ORDER — OXYCODONE HYDROCHLORIDE 5 MG/1
1 TABLET AS NEEDED TABLET ORAL
Status: ACTIVE | COMMUNITY

## 2025-03-26 RX ORDER — GABAPENTIN 300 MG/1
1 CAPSULE CAPSULE ORAL TWICE A DAY
Status: ACTIVE | COMMUNITY

## 2025-03-26 NOTE — HPI-TODAY'S VISIT:
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  Pt was having some internet issues.  The two new drugs excluding people with more advanced disease. Ocaliva had an advised for lower dose of advanced and that did not work so not sure if it will work for this.  March 17 labs show your bilirubin still running around 3.7 from 3.9, direct bilirubin 2.77 down from 2.93 with alk phos up to 617 AST up to 115 and ALT up to 91. On the whole if you look at the labs for the last month there were basically about the same with some variability seen slightly up or down at each juncture.  Ocaliva has many concerns and limited to pts on it with normal labs and no issues.  February 27 labs show total protein normal at 7.3 and albumin normal at 3.9 with a bilirubin little higher at 3.9 from 3.5. Direct bilirubin was 2.93. Alk phos was 596 with an AST of 105 and ALT of 85. Important to note that from February 10 and now the alk phos did come down from 603 with an AST of 124 and ALT of 96 so at least it is headed the right direction but the bilirubin has not come down.  Feb 10 labs show total protein 7.5 albumin 4.0 these are normal. Bilirubin is still running about 3.5 and was 3.6 back in January with direct bilirubin is slightly lower at 2.75 from 2.8 in January. Your alk phos level remains elevated at 603 from 591 and your AST remains elevated at 124 with an ALT of 96 from previous  and ALT of 95.  COnfirmed no tea or energy drinks.  Jan 13 labs show total protein 7.4 and alb 4.0 and tb 3.6 and direct 2.80 and alk 591 and ast 113 and alt 95. Jan 6 recent lavbs showed tb 3.6 (same) and alk 609 little higher) and ast 114 and alt 94 so fairly close.  January 6 labs show INR normal at 1.1 and prothrombin time slightly up at 12.4 from 12.1. Glucose was up again at 116 and please share with local providers. It was also elevated at 112 back in September. BUN of 13 creatinine 0.73 sodium 141 potassium 4.6 calcium 9.7 albumin 3.8. Your bilirubin was up a little higher at 3.6 from 2.2. Alk phos was higher at 609 from 373 AST up to 114 from 89 and ALT up to 94 from 74 so definitely a little bit an upward trend seen there. Have you been ill recently? Any new medicines? Very suspicious that you are taking something new as your labs have been very relatively stable for you for a while now. WBC 3.6 hemoglobin 13.3 platelet count up to 141 from 117 still low though. MCV 92. Neutrophils 1.5 and lymphocytes 1.5. That is a little bit higher 40 which makes me wonder if you were ill recently.  Some resreach with bezofibrate that may be option and that could option and hoping for that.  Fenofibrate: Mild, transient serum aminotransferase elevations develop in up to 20% of patients receiving fenofibrate, but values above 3 times normal in only 3% to 5%. These abnormalities are usually asymptomatic and transient, resolving even with continuation of fenofibrate, but they occasionally may require drug discontinuation. Monitoring of aminotransferase levels is recommended for patients receiving fenofibrate and discontinuation if enzymes persist above 3 times the upper limit of normal (ULN). There have also been multiple reports of clinically apparent liver injury in patients on fenofibrate. Onset of injury is variable; cases resembling acute hepatitis usually arise within a few weeks or months of starting therapy (Case 2), whereas cases resembling chronic hepatitis and cirrhosis typically arise after more than 6 months or even years of treatment (Case 1). The pattern of serum enzyme elevations is typically hepatocellular, but both mixed and cholestatic patterns have also been described. Some instances of acute injury with a short latency (2 to 8 weeks) are associated with fever, rash and eosinophilia, suggesting immunoallergic hepatitis. Cases with a longer latency typically present with nonspecific symptoms of weakness and fatigue, have autoimmune features with hyperglobulinemia, smooth muscle or antinuclear antibody, and a chronic hepatitis-like clinical and histological picture that is sometimes prolonged and associated with significant fibrosis or cirrhosis. Likelihood score: B (very likely cause of clinically apparent liver injury).  September 24, 2024 labs show INR normal at 1.1 and prothrombin time elevated at 12.1. Glucose was elevated 112 and previously had been 108. Please share with local providers. BUN of 15 creatinine 0.79 sodium 140 potassium 0.7 calcium 9.4 albumin 3.8. Bilirubin slightly higher at 2.2 and alk phos slightly lower at 373 and AST 89 and ALT of 74 previously AST 84 ALT of 71. These are similar. WBC slightly low at 3.2 and was normal before. This can vary sometimes. Hemoglobin 13.9 and platelet count was a little higher at 117. MCV was 96. Neutrophils were a little low at 1.2 but up from 1.1. Lymphocytes were normal at 1.4.  U,s March 27 tomorrow  Sept 5 u.s: states visualization of the liver mildly limited due to its high position under the costal margin. No hepatic abnormality seen. Liver slightly nodular surface contour and compatible with hx of cirrhosis. Liver was mildly heterogeneous and small 1cm cyst at the left lobe. Small ascites seen near the liver. Important to stay low salt diet. Pancreas normal where seen. Gallbladder no obvious gallstone or polyp. Common duct not seen due to gas. Rigth kidney 7mm probable calcification at the mid kidney and peripheral 7x3.5cm simple cyst and felt similar to prior. Adjacent simple cyst 1.2cm simple cyst. No hydronephrosis. Left kidney 1.6cm cyst seen mid kidney and faintly delineated and 1cm parapelvic cyst. 6mm parapelvic cyst. No hydroneprhsois. Spleen mildly enalrged 13.1cm and similar to prior. Vessels patent but gas limtied exam of the right and left hepatic veins, Aorta moderate atheroslcerotic changes and felt unchanged and important to watch cholesterol lipids. Overall cirrhotic liver and 1cm cyst seen and no other issues, Small ascites seen and need to stay low salt diet as salt can lead to more fluid formation. Mild splenomegaly and bilateral renal cysts and one renal cyst was not as well seen.    July 15 labs show glucose down to 108 from 115 and prior 136.  BUN of 15 creatinine little bit higher at 0.96 from 0.8. Many people lately have been running a little bit higher due to the increase in heat on their hydration and kidney function. Working on your hydrational status with the heat may help with that to be back to usual. Please share with local providers. Sodium 141 potassium 4.6 calcium 9.4 albumin 3.8 and these are normal. Bilirubin remains a little lower at 1.9 from prior January 2.2. Alk phos a little lower at 405 from 461 and 483. AST lowered at 84 from 95 and prior 96 ALT down to 71 from 90 and prior 117. Is or anything different that you are doing that could be dropping this? Says he was taken off the vitamin d3 and stopped mvi.  WBC 3.6 hemoglobin 13.2 plate count 108 which is a little low but up from 106 back in April. MCV normal 94. Neutrophils remain a little low at 1.1 from 1.3 and lymphocytes normal at 1.8. INR normal at 1.1.  2 new drugs approved pbc and headed to a meeting to learn more and we will see. These have less itching that the ocaliva does. They have less of the itching induction.  Last imaging feb and says u.s was rebooked and rebooked sept at [facility]>  April 10 labs show glucose elevated at 115 but down from 136 in January. BUN of 15 creatinine 0.8 sodium 141 potassium 5.1 calcium 10.0 albumin 3.8 and these are stable for you. Bilirubin was down to 1.9 from 2.2 so that was good to see. Alk phos also lower at 461 AST slightly low at 95 and ALT slightly lower at 90. Need to keep following these labs for trends. Good to see them at least  not going up anymore like they did back in January. WBC 3.4 hemoglobin 14.2 platelet count 106 a little bit down from 127. Neutrophils and lymphocytes were checked with a neutrophils low at 1.3 and lymphocytes normal at 1.6.  DId the knee surgery was late sept 2024 and he did well. He is moving more and on therapy and that will help. 3- Wrote for knee surgery V[place] score using these labs: 30 day mortality: 0.9% 90 day mortality: 2.2% 180 day mortality: 4.1% 90 day decompensation 5.5%. Estimated Meld was 9.0 and meld na 9.0 as no recent INR.  Local [place] imaging ultrasound from February 13 2024 was sent in to us. Unclear why we had not received it until now. Liver had a nodular surface contour and a heterogeneous appearance of the liver parenchyma.  No discrete mass was seen.  Portal vein appeared patent with appropriate directional flow. Pancreas was normal were seen. Gallbladder showed no evidence of inflammation or sludge.  Negative Wick sign.  Normal wall thickness was seen.  6 mm gallbladder wall echogenic focus was seen. Common bile duct showed no evidence of duct dilation. Right kidney showed no hydronephrosis was 10.6 cm.  You had a 5.8 cm simple appearing cyst and an 8 mm echogenic focus. Left kidney showed no hydronephrosis and was 11 cm with a 1.1 cm simple cyst seen.  Please share with local providers. Spleen was mildly enlarged at 13.1 cm. In regards to your aorta, you do have some signs of scattered atherosclerotic plaque.  Aorta measured up to 2.5 cm.  Important to share with your primary provider so that they can follow your cholesterol issues in regards to this. Liver vessels were patent. Splenic vessels were patent. In summary, they felt that you had a cirrhotic appearing liver without any definite liver lesions and mild splenomegaly.  They suspect that you may have a 6 mm possible gallbladder polyp versus adherent stone and a possible 8 mm right kidney nonobstructive calculus versus echogenic area. I went back and looked at your prior ultrasound from last year and it had suggested that the gallbladder polyp at that timeframe was 3 mm in size and said it was a possible polyp versus stone. We need to talk about considering doing a follow-up ultrasound in 3 months to check if the gallbladder echogenic focus continues to grow and if so, we need to talk with surgery about considering a gallbladder surgery for that if it indeed continues to grow.  May be due for egd with Dr Vann as done usually every 2 yrs.  June 9, 2022 EGD was sent in by Dr. Vann' office.  She actually called me on Friday late after office closed to get the fax number and I advised her to have them call Sweta today to get the monitored fax line for urgent faxes. I also asked Sweta to call her office  today, to give her the exact fax that could be monitored and that led to an almost immediate receipt of the report which I was very happy to get. In that report she states that your duodenum was normal but you did have diffuse moderate inflammation characterized by congestion/edema with erosions and erythema in the gastric body and gastric antrum and that she did biopsies with cold forceps for this. She is that you had a small hiatal hernia with some grade B esophagitis which means some irritation of the esophagus. 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 She recommended a repeat endoscopy in 3 to 5 years, but I would recommend given your clinical course, that it be instead considered to be repeated in 2 years instead because of the fact that you had grade 1 varices and we need to see if those are getting larger or not with your liver disease issues staying on higher labs.  We can discuss this at your upcoming visit and confer also with Dr. Vann considers about this. My main concern is that the liver labs are remaining up and so I just want to make sure that this is not getting any worse in the interim.   January 16 labs show sugar slightly up at 136 and unclear if you were fasting or not? BUN of 13 creatinine 0.81 sodium 141 potassium 4.7 chloride 102 and CO2 of 26 and these are normal. Your calcium was slightly up at 10.3 with normal being from 8.6 up to 10.2. Have you been taking any calcium or vitamin D supplements lately? Not on vitamin supplements but eating little more. Albumin was 4.2 normal. Bilirubin slightly higher at 2.2 from 1.6 and alk phos 483 from 442. AST up slightly to 96 and ALT also up to 107. Any recent illness or other issues that may have caused the labs to fluctuate up versus your prior labs? WBC 3.9 hemoglobin 14.5 and platelet count slightly lower at 127 from 132 before. MCV 90. Neutrophils were normal at 1.5 and lymphocytes 1.8 which would suggest less likely for you to have been ill recently.  No recent illnes. Says exercise is less due to weather.  10/16/23 telemed  Oct 10 labs show wbc 3.9 and hg 13.8 and platelets 132 little lower and prior normal 159. Mcv normal 90. Neutrophils 1.3 little low and lymphs 1.8 normal. Glucose 124 elevated and prior 124 also. Were you fasting this day? Bun 16 and cr 0.85 and stable.Na 140 and k 4.4 and cl 103 and co2 26. Ca 9.8 and alb 3.8 and tb 1.6 little lower from 1.8.Alk 442 little lower from 496. Ast 83 and alt 81 and both down from prior 108 ast and alt 98.  October 9, 2023. Also saw that i was listed as your ordering provider for this (I do not see that we have ordered this) and it states that you have no evidence of any abdominal aortic aneurysm.  August 30 labs show albumin stable at 4.1, bilirubin down to 1.8 from 2.0. Direct bilirubin 1.1. Alk phos 456 slightly up from 432 in May. AST 85 down from 105. ALT 82 down from 103 in May. Glad to see that that this is starting to look like it is coming down lets see what the trend is.  July 10 2023 and was reviewed by Dr. Hopkins per the note. And mentioned that you had osteopenia with your spine score being -0.7 the left hip -1.3 and the left femoral neck being -1.6. There is a suture fracture risk for the hip is 0.7% and major osteoporotic fracture risk is 2.5% over the next 10 years.  July 10 2023 ultrasound shows liver coarsened in its echotexture with blunting of the liver edges and mildly nodular contour.  No lesions seen. No dilated bile ducts seen and common bile duct 4.6 mm. Small gallbladder polyp seen measuring 3 x 3 x 3 mm.  Wick sign negative. Of note, prior u.s in Jan 2023 did not show this and so we need to follow this over time. Typically the concern is if this is a gallbladder polyp and not a stone is if it grows to 10 mm or more in size. Pancreas not seen due to overlying structures. Right kidney 10.4 cm with no hydronephrosis but with a simple right renal cyst measuring 6.4 x 6.7 x 3.9 cm that was previously 6.5 x 4.0 x 5.5 cm.  Punctate echogenic focus seen in the mid right kidney measuring 0.5 x 0.7 x 0.4 cm favored to be a stone. Left kidney 10.6 cm with no hydronephrosis. Spleen 13.2 cm. Liver vessels were patent which was good to see. In summary, they see chronic liver disease but without any sonographically evident lesions and patent vessels. They feel that this is a small gallbladder echogenic focus measuring 3 mm that they think is a pedunculated polyp versus less likely a stone. We will need to monitor this and look for any changes over time.  July 6 labs show lipase normal at 41, INR normal at 1.0, glucose was elevated at 124 down from 135. BUN of 15 creatinine normal 0.85 sodium 138 potassium 4.4 calcium 9.8 albumin 3.9. Bilirubin slightly higher at 1.8 from 1.7.  Alkaline phosphatase 496 up slightly from 491.   from 98 and ALT 98 from 94. WBC 3.9, hemoglobin 13.7 platelet count 159 MCV 92.  Neutrophils 1.6 and lymphocytes 1.5.  Sadly for these labs our only option would be to consider trying the Ocaliva again which had side effects for you versus continuing to wait for new or medicines for PBC to come out which may be next year or the following.  There is a medicine from [place] that they are trying to get approved.  That would be the next 1 to come out.  June 8 labs show sugar elevated at 135 and previously 119.  Have you been fasting when you do these labs?  There clearly are remaining elevated. BUN of 13 creatinine 0.756 sodium 141 potassium 4.6 chloride 106 CO2 of 25 albumin 3.9. Bilirubin slightly higher at 1.7 from 1.6.  Alk phos slightly higher at 491 from 452.  AST up to 98 from 87 and ALT up to 94 from 88.  Unclear to me if the sugars could also be making the liver labs be trending higher?  Have you done a hemoglobin A1c with primary provider to see what that shows? WBC 3.9 hemoglobin 13.6 platelet count slightly low at 147 MCV 92 and neutrophils 1.6 and lymphocytes 1.6. I think that looking at and clarifying the issue as to your need for getting the sugars to be optimal may be a reasonable issue to focus on for you and seeing what that does may help lower these. It is known that  sugars if not controlled can impact many other liver diseases.  May 10 labs show albumin 4.1, bilirubin elevated 2.0 and was 1.37 on the direct.  Alk phos was 452 slightly lower.  AST was slightly higher at 105 from 93 and ALT was 103 up from 98. Called pt to see if he is on a new meds. He denies any herbs or teas. No illness. No antibiotics. No nsaids. Not eating any excess of any vegetable supplements. Wt  was 77.5 kg and so now 1165 is max 1000 alternate 1250mg a day to get to dose that fits weight now.  Sweta was able to obtain your ultrasound and gave it to me, which was dated January 9, 2023, and it mentions that your liver had increased geographic echogenicity with macro and micronodular contour but no definite lesions. Liver showed no dilated bile ducts and common bile duct 3.4 mm. Gallbladder normal. Pancreas normal. Right kidney measured 9.5 cm with normal echogenicity and no hydronephrosis and simple right renal cyst measuring 6.5 x 4 cm and was previously 6.4 x 6 cm.  This was better evaluated on the more recent MRI.  Nonshadowing echogenic focus seen in the mid renal pole measuring 1.4 x 1.1 cm and felt likely corresponding to renal sinus fat. Left kidney measure 11.4 cm with normal echogenicity. Spleen was normal at 12 cm. Liver vessels were patent. Given this you need to repeat the scan in 6 months which would be then in the July timeframe. We will put in the order for this to be done in July for you.   April 7 labs show sugar still elevated at 119 but down from 131 December.  Please share with primary provider.  BUN is 17 creatinine 0.77 which is actually better than in December when it was 0.89.  Both however are normal range. Sodium 141 potassium 4.8 and chloride 101 which is normal. Your calcium was up at 10.5.  Are you taking a calcium and vitamin D supplement?  If you are that sometimes can cause this.  Please share with primary provider as this was normal before at 10.2 and prior to that 9.7. Asked pt and he is on vit d and regular vitamin. Asked him to check with primary re that as his calcium was high. Albumin normal at 4.1. Bilirubin slightly higher this time at 1.6 and previously 1.1.  Alkaline phosphatase was higher at 452 from 423 and AST was higher at 87 and ALT of 88 from prior levels. Asked pt if he was ill and says has been a bit stressed as son ill.  March 7 labs show albumin stable at 4.5 and actually rising which is good to see.  Bilirubin down to 1.1 from 1.3 with a direct 0.68.  Alk phos slightly higher at 470 from 427 AST and ALT slightly higher at 93 and 98 from previous AST of 84 and ALT of 82. Not really see in the labs drop yet and still slightly going up.  Again just confirming no new meds or herbal remedies?  Please let us know.  February 6 labs show albumin 4.4 which is normal and in fact a little higher for you.  Total bilirubin still 1.3 as before on January 9.  Direct bilirubin 0.68.  Alk phos went up a little to 427 from 400 and AST 84 and ALT 82 with previous AST 63 and ALT 69.  Egd from June 9 from Dr Vann. Told not due yet. Gastric body and gastric antrum with congestion, erosions, and erythema. Duodenum was normal. Small hiatal hernia present. LA grade B esophagitis seen in esophagus. 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 They mentioned to redo in 3-5 yrs? They ordered pantoprazole 40mg po qd.  December 21 2022 labs show INR normal at 1.0 which is good to see.  Is actually lower than back in September when it was 1.1. Sugar was elevated at 131 and as we mentioned before that may be related to you doing the labs not fasting.  Please share with primary providers. BUN of 17 creatinine 0.89 sodium 141 potassium 4.8 calcium 10.2 albumin 4.4 bilirubin 1.1 alkaline phosphatase went up a little to 423 from 323.  Any recent issues?  AST was 72 ALT of 70 up from 57 and 49. White blood cell count 4.1 hemoglobin 14.6 platelet count 151 MCV 98 neutrophils 2.2 lymphocytes 1.3. Called pt: took a medicine for his knees: meloxicam he took and has 8% risk to raise the labs. So that is what did that.  He did stop that.   September 19 labs show white blood cell count 3.6 hemoglobin 14.9 platelet count 145 down from 164.  Normal is from 150 up to 450.  1 year ago and September 3, 2021 the platelet count was about the same at 144. Neutrophils 1.6 lymphocytes 1.4 both normal.  Sugar was slightly up at 148 from previous 137.  BUN of 12 Cr 0.68.  Sodium 143 potassium 4.2 albumin 4.2 bilirubin normal at 0.6 and down from 0.9. Alkaline phosphatase down from 404 to 323.  AST slightly lower at 57 from 58.  ALT down to 49 from 58.  So they are moving in the right direction. INR normal at 1.1. Meld 7 and meld na 7 so remains low.  June 20 labs showed sugar elevated at 138.  BUN of 14 creatinine 0.79 sodium 140 potassium 4.6 chloride 102 CO2 27. Albumin 4.0 bilirubin 0.8 down from 1.1.  Urine bilirubin 0.38 down from 0.43.  Alkaline phosphatase 319 from 298 so it went up slightly.  AST went down to 45 and ALT to 46 from previous AST 60 and ALT 60. Overall the liver labs and the AST and ALT are lower and the alk phos slightly higher.  July 9 MRI sent in to me. Lower thorax shows minimal bibasilar atelectasis. Liver showed no significant fat or iron but does have chronic liver disease changes seen including lobar redistribution and nodular contour.  There are some reticular enhancement changes that are compatible with fibrosis.  No suspicious liver lesions seen.   Liver vessels are patent as expected. Small varices seen near the stomach and spleen.  Important to stay on top of egd surveillance for these issues. Recannulized periumbilical vein noted which is another sign of portal hypertension Spleen slightly enlarged at 13 cm. Pancreas and adrenal glands normal. Kidneys show some renal cysts and no further description was given. They did see your appendix and it appeared normal to them. They also saw some mild atherosclerotic disease of the abdominal aorta but without aneurysm.  Please share with primary providers to be aware of same. Degenerative changes seen of your spine. We may be able to look towards doing an ultrasound alternating with an MRI and we will discuss this further at your next visit.   March 15 labs show glucose elevated at 137 previously 143 and please share with primary provider.  BUN of 14 creatinine 0.86 sodium 139 potassium 4.8 calcium 10.0 albumin 4.2 bilirubin 0.9.  These other labs are normal range. Alkaline phosphatase did go up to 404 from previous 394 and prior 327.  AST came down from 63 to 58.  ALT came down from 68 to 58. Hill cell count 4.1 hemoglobin 14.9 platelet count 164 normal.  MCV 89. Neutrophils 1.4 and lymphocytes 1.9.  Ocaliva was on it had toxicity risk and not much lab benefit and being cirrhotic is higher risk and he is  getting older.  January 29 2022 MRI Lower thorax showed bibasilar atelectasis which means that the bases of the lungs were not fully expanded.  So metimes we can see that when you are in the MRI machine in that fixed position for a while.  Please share with primary provider. Liver showed no fat or iron but did have chronic liver disease changes including a nodular contour and lobar redistribution.  Some fibrosis changes seen.  No suspicious lesions.  Small left lobe hepatic cyst seen. Gallbladder normal with some unchanged mild intrahepatic bile duct dilation most pronounced in the periphery. Spleen was top normal in size at 13 cm. Pancreas, adrenal glands, and lymph nodes were normal. Stable renal cyst seen bilaterally. Mild atherosclerosis of the aorta seen without aneurysm.  Small varices near the esophagus and stomach so would need to stay on top of that EGD surveillance. Mild degenerative changes seen of the spine.  He did the covid 19 series and March 5 2021. He did the covid booster.  Meds off ocaliva for summer 2020.   November 30 labs show INR normal at 1.0 which is good to see.  Glucose currently 154 elevated and previously was 143 and prior to that 129.  All 3 were elevated.  We have discussed this previously.  Please share with primary providers per their review. BUN of 13 creatinine 0.84 sodium 142 potassium 4.8 chloride 102 CO2 of 27 calcium elevated at 10.3. Asked if he is on any calcium supplements?  he said not taking any that he knows. Previously was normal at 10.2 and prior 10.1.  Please share with primary provider also.  Albumin 4.5 bilirubin 1.0 which is normal.  Alkaline phosphatase elevated at 394 previously 327 and prior 309.  AST 63 and ALT 68 which appeared to be slightly higher from prior AST of 47 ALT 45. Asked if he had any new meds and he says he is a vit d supplement. White blood cell count 4 hemoglobin 14.8 platelet count slightly low at 146 but improved from last time at 144.  MCV normal at 90.  Neutrophils 1.5 and lymphocytes 1.7. Meld low at 6 and meld na 6.    September 3 labs show INR remains perfectly normal at 1.0 and was previously 1.1. Glucose still remains elevated at 143 previously 129 and share with primary provider.  BUN of 13 creatinine 0.84 sodium 140 potassium 4.5 calcium 10.2 albumin 4.1 bilirubin 1.0.  Previously bilirubin 0.7 but remains normal again at 1.0.  Alkaline phosphatase slightly up at 327 from 309 previously 355.  AST down to 47 from 55 from prior 63.  ALT 45 down from 51 and prior 59 so those continue to drop.  White blood cell count 4.3 hemoglobin 14.9 platelet count 144 which is slightly lower from 162.  MCV 90.  Neutrophils normal at 1.4. Meld remains low at 6 and meld na 6.  July 10 MRI shows the lower thorax to have bibasilar atelectasis. Liver shows morphologic changes of chronic liver disease with nodular contour and lobar redistribution.  They do see changes in the liver parenchyma that are suggestive of fibrosis.  More confluent fibrosis seen in the right lobe. No definite suspicious liver lesions seen.  You do have some perfusion anomalies which need to be rechecked in 6 months.  Scattered hepatic cysts are seen. Gallbladder was unremarkable and mild intrahepatic bile duct dilation was seen also more conspicuous in the right lobe versus the left.  This is felt to be compatible with your PBC diagnosis.  No extrahepatic bile duct dilation seen. Spleen top normal at 12.9 cm with small splenule in the left upper quadrant. Pancreas normal. Renal cysts were seen. Colon diverticulosis was noted as well. No inflammation however was seen in the colon. Mild paraesophageal perigastric and perisplenic varices were seen and moderate aortobiiliac atherosclerosis seen. Overall they felt you had signs of chronic liver disease but no evidence of any malignancy.   June 9 laboratories show white blood cell count 4.3 hemoglobin 13.8 platelet 162.  These are stable for you.  Platelet count is actually better than it was before at 151.  Neutrophils are stable at 1.6 and previously were 1.5.  Total bilirubin is down to 0.7 from 0.8.  Alkaline phosphatase is lower at 309 from 355.  AST down to 55 from 63 ALT 51 down from 59 and prior to that 68.  So the liver labs continue to be dropping.  Sodium 139 potassium 4.6 chloride 102 CO2 26 BUN 17 creatinine 0.93 glucose 129.  Sugar is actually lower than the previous time at 146. INR 1.1.   Meld score low at 7 and meld na 7.  April 27: alb 4.3 and tb 1.1 and db 0.43 and alk 298 and ast 60 and alt 60.  March 2: alk 355 and ast 63 and alt 59 so in fact alk phos lower now to 298 and ast and alt about the same.  8 weeks off labs March 2 and inr 1.0 and tb 0.8 and alk 355 and down from 376 and prior 392. ast 63 and alt 59 and down from 65 and 68 so little lower.  ca 10.0. na 140 and k 5.0 and glu 146 elevated. bun 18 and cr 0.9. wbc 3.9 and hg 14.9 and plat 151 and mcv 90.   4 weeks off labs: jan 25 2021 and inr normal 1.0 and so little lower now. tb 0.8 and lower from 0.9 and alk 376 from 392 so lower and ast 65 and alt 68 and prior ast 60 and alt 68 so not much of a change seen. na 143 and k 4.6 and cl 103 and co2 23 and cr 0.93. glu 128 elevated and mentioned to him. defer to local md re your sugars. cr 0.93. wbc 4.1 hg 14.5 and plat 138 (down from 165) and mcv 89.  Jan 23 mri: Morphologic changes of chronic liver disease without  hepatocellular carcinoma seen so that is good to note. Patent portal venous system with stigmata portal hypertension, including upper abdominal varices and splenomegaly noted so need to stay on top of egd surveillance. Right renal cyst with area of complexity on prior ultrasound demonstrates no septation, enhancement, or nodularity on this examination, compatible  with a simple cyst ( 4.7 x 6.2 x 7.8 cm.) This may have represented artifact on prior ultrasound. Continued attention on follow-up for liver disease was recommended. Liver showed no fat or iron. Scattered simple cysts in the left hepatic  lobe. Periesophageal, perigastric, perisplenic varices seen. Mild intrahepatic duct dilatation peripherally extending to the capsule, greater in the right hepatic lobe, compatible with primary biliary cirrhosis. No extrahepatic biliary ductal dilatation. Normal gallbladder. Spleen was enlarged measuring 13.1 cm in the craniocaudal dimension.Adjacent splenule. Pancreas normal. Mild atherosclerotic disease abdominal aorta without aneurysm. Mild degenerative changes of the spine.  Document Type: MRI Abdomen w/ + w/o Contrast Document Date: January 23, 2021 09:35 Document Status: Auth (Verified) Document Title: MRI Abdomen w/ + w/o Contrast Performed By: Jason Kapadia Verified By: Jason Kapadia on January 25, 2021 07:35 Encounter info: [id number], [facility], Single Visit OP, 1/23/2021 - 1/23/2021 * Final Report * Reason For Exam PBC//HEPATIC FIBROSIS//DIABETES//FATTY LIVER//RENAL CYST//ELEVATED ALKALINE PHOSPHATASE LEVEL REPORT EXAM: MRI Abdomen w/ + w/o Contrast CLINICAL INDICATION: PBC//HEPATIC FIBROSIS//DIABETES//FATTY LIVER//RENAL CYST//ELEVATED ALKALINE PHOSPHATASE LEVEL. TECHNIQUE: Multisequence, multiplanar MRI of the abdomen was performed without and with intravenous contrast. ESRC.2.7.3 CONTRAST: 16 cc of Prohance COMPARISON: Outside MRI dated 9/1/2020. Abdominal ultrasound dated 8/10/2020. FINDINGS: Lower Thorax: Normal. Liver: No fat or iron. Morphologic changes of chronic liver disease, including lobar redistribution and nodular contour. Delayed reticular enhancement of the hepatic parenchyma, compatible with fibrosis. More confluent fibrosis in the right hepatic lobe. Scattered simple cysts in the left hepatic lobe. No hepatocellular carcinoma. Hepatic vasculature is patent. Recannulized paraumbilical vein. Periesophageal, perigastric, perisplenic varices. Gallbladder/Biliary Tree: Mild intrahepatic duct dilatation peripherally extending to the capsule, greater in the right hepatic lobe, compatible primary biliary cirrhosis. No extrahepatic biliary ductal dilatation. Normal gallbladder. Spleen: Enlarged measuring 13.1 cm in the craniocaudal dimension. Adjacent splenule. Pancreas: Normal. Adrenal Glands: Normal. Kidneys/Ureters: Renal cysts. Right renal cyst with area of complexity on prior ultrasound demonstrates no septation, enhancement, or nodularity on this examination and measures 4.7 x 6.2 x 7.8 cm. Gastrointestinal: No bowel obstruction. Lymph Nodes: Normal. Vessels: Mild atherosclerotic disease abdominal aorta without aneurysm. Peritoneum/Retroperitoneum: Normal. Bones/Soft Tissues: Mild degenerative changes of the spine. IMPRESSION: 1. Morphologic changes of chronic liver disease without hepatocellular carcinoma. 2. Patent portal venous system with stigmata portal hypertension, including upper abdominal varices and splenomegaly. 3. Right renal cyst with area of complexity on prior ultrasound demonstrates no septation, enhancement, or nodularity on this examination, compatible with a simple cyst. This may have represented artifact on prior ultrasound. Continued attention on follow-up for liver disease. Signature Line *** Final ***  Electronically Signed By: Jason Kapadia on 01/25/2021 07:35 Dictated by: Jason Kapadia  2 weeks off ocaliva: Sanju 15: glu 117 elevated some but similar to nov 117 but down from 204 in dec 30. bun 16 and cr 0.91. na 141 and k 4.7 and cl 102 and ca 10.1 and alb 4.2 and tb 0.9 down from 1.1 in nov. ast 60 and alt 68 and so about the same as you can see vs other two labs and alk 392  about the same vs dec but little up from nov 354. wbc 4.1 hg 14.8 plat 165. inr 1.1 stable. So no dramatic changes seen yet that point.  Dec 30 2020 labs in middle: wbc 3.9 hg 15.1 plat 167. mcv 97. tb 0.9 down  from 1.1 and alk 399 slightly higher from 354. ast 64 and alt 66 and prior ast 63 and alt 60. na 139 and k 4.7 and cl 99 and co2 27. glu 204 elevated so that is newer issue as prior 117 and 134 so show to local md. bun 15 and cr 1.07 little up and prior 0.85 so little dry and could be linked to the sugars.  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  He is on urosodiol 500mg BID and had been on for years Ocaliva 10mg q other day due to pruritis and then off due to pruritis.  He weighs 183. Max dose 1100 to 1250 range 13-15mg/kg and we can ursodiol to 1.5 in the am and 1 in the evening. that may help and will avoid something more risky.  Itching much better on the gabapentin and off the ocaliva.  Nov 25 2020: wbc 4.2 hg 15 and plat 150 and mcv 90. Neutrophils 1.3 and prior 1.4 and lymphs 2.0 and prior 1.9. tb 1.1 and alk 354 and ast 63 and alt  60 and prior tb 0.9 and alk 343 and ast 55 and alt 61. So about the same. glu 117 and down from 134. bun 15 and cr 0.85 and na 139 and k 4.4 and cl 101 and co2 25. alb 4.2 normal.  Saw local mri: 9-1 20: nonenhancing renal cyst and no definite renal mass. Largest cyst right kidney 5.7cm. Not surprisingly they thought liver appeared to be cirrhotic. Also apparent nonenhancing cysts in the liver up to 10.6mm. Nonspecific biliary dilation in liver. Extrahepatic duct appears decompressed. Nonspecific splenomegaly seen and no significant varices seen. Left adrenal suspected adenoma. Appendix seemed somewhat prominent to then at 6.4mm but was probably similar to prior per them. No definite gallstones. Prior [place] imaging liver coarsened. They recommended mri to better see possible complex renal cyst.  Saw urologist re the kidney issue was stable. They were to see him again in feb 2021 and had been changed.  The patient relates no significant family or personal history of liver disease. He states no history of new medications or alcohol use. The  patient reports a personal history of no other habits that could cause liver damage.  July 2020: tsh very low and show local md. T4 normal 8.5. inr 1.1 and tb 0.9 and alk 343 and ast 55 and alt 61 and tb 0.9 and alb 4.2 and ca 10.6 elevated and show local md also. na 144 and k 4.8. glu 134 elevated and maybe not fasting. cr 0.83. wbc 4.1 hg 15.3 plat 170.  Prior April ast 67 and alt 66 and alk 362 and tb 0.9 so liver labs little lower this last time despite the low dose and alk little lower also. calcium prior 10.1 and is not on any calcium supplements.  He says local doctor following thyroid and says he is doing better.  4- wbc 4.0 and hg 14.3 and plat 155 and neutrophils 1.2 little low. glu 130 and cr 0.78 and na 140 and k 4.4 and cl 101 and co2 22 and alb 4.3 and tb 0.9 and alk 362 and ast 67 and alt 66 and inr 1.1.  2-17-20 and wbc 4.1 hg 14.5 plat 153 and neutrophils 1.3 and glu 143 and cr 0.96 and na 140 and k 4.3 and cl 100 and co2 25 and alb 4.0 and tb 0.8 and alk 352 and ast 66 and alt 61 and inr 1.0.  12-16-19 and wbc 3.6 and hg 14.3 and plat 184 and neutrophils 1.3 and glu 124 and cr 0.86 and na 142 and k 5.0 and cl 102 and cl2 25 and alb 4.3 and tb 0.9 and alk 353 and ast 70 and alt 67 and inr 1.0.  Document Type: US Abdomen Doppler Complete Document Date: August 10, 2020 10:04 Document Status: Auth (Verified) Document Title: US Abdomen Doppler Complete Performed By: Jack Martin Verified By: Candelaria Rodriguez on August 10, 2020 11:41 Encounter info: [id number], [facility], Single Visit OP, 8/10/2020 - * Final Report * Reason For Exam primary billary cholangitis REPORT EXAM: US Abdomen Doppler Complete, US Abdomen Complete CLINICAL INDICATION: Primary billary cholangitis. TECHNIQUE: Grayscale, pulsed wave and color Doppler sonography of the upper abdomen were performed. ESRC.3.7.1 COMPARISON: None FINDINGS: Liver: Coarsened echotexture. No lesions. Bile Ducts: No dilated intrahepatic biliary radicles. Common duct measures 4 mm. Gallbladder: Normal. Wick's sign is negative. Pancreas: Partially obscured by overlying structures. Right Kidney: Measures 11.4 cm. Normal echogenicity. No hydronephrosis. 6.4 x 5.9 x 5.3 cm inferior pole cystic lesion with internal thickened septation versus areas of nodularity, indeterminate. Hyperechoic nonshadowing cortical focus measuring 0.5 x 0.4 x 0.2 cm.. Nonshadowing hyperechoic focus near the corticomedullary junction measuring 1.1 x 0.8 x 0.6 cm. Left Kidney: Measures 10.6 cm. Normal echogenicity. No hydronephrosis. Spleen: Measures 12.2 cm. Aorta: Normal caliber where imaged. IVC: Normal proximally, not imaged distally. Hepatic Veins: Normal. Portal Veins: Hepatopetal flow. Velocity of 27 cm/s in the main portal vein, 25 cm/s in the right portal vein, and 20 cm/s in the left portal vein. Hepatic Arteries: Peak systolic velocity 115 cm/s. Resistive index 0.77. Other: None. IMPRESSION: 1. Complex cystic lesion within the right kidney with thickened septation/areas of nodularity. Recommend MRI for further evaluation to exclude enhancing/solid components. Nonshadowing echogenic foci in the right kidney may represent nonobstructing stones although underlying lesions cannot be excluded. This can be evaluated at the time of MRI. 2. Coarsened hepatic echotexture can be seen with hepatic steatosis or chronic liver disease. No intra or extrahepatic biliary ductal dilation. Patent hepatic vasculature. The images were reviewed and interpreted by Candelaria Rodriguez MD. Signature Line *** Final *** Electronically Signed By: Candelaria Rodriguez on 08/10/2020 11:41 Dictated by: Jack Martni  12- u/s coarse hepatic ehcotexure and consistent with cirrhosis and 1.2cm hepatic cyst. Patent vessels and right renal cysts up to 5.7cm and some nonobstructing right renal calculi. Spleen 12.3cm.  Oct 14 2019 wbc 3.8 and hg 14.4 plat 177 and neutrophils 1.3 little low and glu 123 and cr 0.74 and na 141 and k 4.5 and cl 100 and co2 25 and alb 4.3 and tb 0.9 and alk 336 ast 67 and alt 61. hep b immune 40.5  Sept 18 2019 na 140 and k 4.7 and cl 103 and glu 121 and bun 12 and cr 0.79 alb 3,8 and tb 0,9 and ca 9.6 and ast 49 and alt 48 and alk 334 and a1c 5.7 and chol 228 and trg 52 and hdl 86 and ldl 132 nd psa 0.01 and wbc 4.2 and hg 144 and plat 183.  June 19 2019 and liver midly coarse and 1cm hepatic cyst and stable and gb not distended and no stones and bile ducts not dilated and spleen stable 12.3cm abd right kdiney 11cm and several right kidney cysts up to 6.2cm. Saw stone sin the right kidneuy. No hydronephrosios. Vessels patent. No change vs 2018.  Dec 2018 u.s with liver appearing fatty and patent vessels. Right kidney cyst 6.1x4.7x5.3cm stable abd simple. Liver cyst 1.1x0.8x1.1.cm. Similar to prior scan.  April 2019 labs wbc 4.3 hg 14.7 plat 183 and glu 134 and cr 0.85 and na 141 k 5.1 and tb 1.0 and alk 446 and ast 85 and alt 91.  Feb 2019 alk 418 and ast 79 and alt 81.  Dec 2018 alk 440 and tb 0.9 and db 0.48 and ast 76 and alt 92. Liver 76% and bone 20% and intestine 4%.  11/26/2018: wbc 4.6, hgb 14.7, plts 175,000, gluc 130, vet 0.89, na 142, k 4.0, alb 4.4, frances 2.9, t.bili 1.0, , AST 75, ASLT 80,INR 1.0, TSH 2.26  9/04/2018: wbc 4.6, hgb 14.4 plts 166,000, gluc 130, creat 0.95, na 143, k 4.5, alb 4.3, frances 3.1, t.bili 0.8, , AST56, ALT 62, INR 1.0, TSH 2.17,  6/05/2018: HBsAb 4.5, HBsAg neg, HBcAb neg, HAV pos  6/20/2018: liver echogenic suggesting mild fatty infiltration, simple cyst 1.1 x 0.7 x 1.3,, gallbladder unremarkable, mpv patent, cbd 4mm, right kidney simple cyst 5.6 x 4.8 x 5.2 cm, echoegenic focus 1.3cm consider kidney stone.  His cholesterol is checked by Dr. Sachin Dimas. He says cholesterol has been ok.   Reminded pt re bone density and needs to be following locally and he has not done this and reminded to get with local provider.  11-13-19: spine normal and t score 0.1 and z score 0.1/ femur neck -0.9 and -0.4 and femur total -1.0 and -0.9. He shared with primary md. he will check with primary md to redo as been 2 yrs.  Plan: 1. Pt will do the labs in 8 and 16 weeks. Hoping that surgery stress passes and the liver settles. 2. DO u.s tomororw. 3. Waiting for new drug to come out that is fibrate related or for the current with drugs to have a new dose. 4. Pt will see us in 4 m.  Duration of the visit was 40  minutes with 20 minutes of chart prep and 20 minutes by dox telemed video with time spent reviewing historical and recent records, discussing theircurrent status relative to same and reviewing future plans for the patient.

## 2025-04-03 ENCOUNTER — TELEPHONE ENCOUNTER (OUTPATIENT)
Dept: URBAN - METROPOLITAN AREA CLINIC 86 | Facility: CLINIC | Age: 85
End: 2025-04-03

## 2025-04-03 NOTE — HPI-TODAY'S VISIT:
Khanh Sevilla Person,   April 2 2025 u.s:    Liver coarsened echotexture and increased parenchymal echogenicity and nodular hepatic surface contour and no lesions seen.   Bile ducts with no dilated intrahepatic biliary radicles and common duct 1.9 mm.   Gallbaldder with echogenic adherent area along wall [id number] and felt to be a gallbladder polyp. Wick sign is negative.   Pancreas partially obscured bowel gas and visualized portions within normal limits.   Right kidney 10.2cm and no hydronephrosis. Simple cyst 6.8x6.4x5.9cm andechogenic focus in interpolar right kidney measured 1.2cmx0.5x0.9cm an d additional echogenic focus seen right kidney measuring [id number].   Left kidney 10.6cm and normal echogenicity. No hydronephrosis and cortical expophytic cyst seen 2.9x2.6cmx2.6cm.   Spleen 13.9cm and mildly enlarged.   Aorta normal caliber and iVC normal proximally and not imaged distally.   Hepatic veins normal and portal veins patent.   Hepatic artery patent as expected.   They saw small volume perihepatic ascites.  Important to stay low salt diet to limit that.    In summary, morphologic chronic liver disease and splenomegaly seen and no suspicious lesions. Patent vessels seen and small unchanged gallbladder polyp. Nonobstructing right nephrolithiasis seen and no hydronephrosis bilaterally. Renal cysts seen bilaterally.   Dr Longoria

## 2025-04-08 ENCOUNTER — TELEPHONE ENCOUNTER (OUTPATIENT)
Dept: URBAN - METROPOLITAN AREA CLINIC 6 | Facility: CLINIC | Age: 85
End: 2025-04-08

## 2025-05-19 ENCOUNTER — LAB OUTSIDE AN ENCOUNTER (OUTPATIENT)
Dept: URBAN - METROPOLITAN AREA TELEHEALTH 2 | Facility: TELEHEALTH | Age: 85
End: 2025-05-19

## 2025-05-21 ENCOUNTER — TELEPHONE ENCOUNTER (OUTPATIENT)
Dept: URBAN - METROPOLITAN AREA CLINIC 86 | Facility: CLINIC | Age: 85
End: 2025-05-21

## 2025-05-21 LAB
ALBUMIN: 3.8
ALKALINE PHOSPHATASE: 537
ALT (SGPT): 82
AST (SGOT): 105
BILIRUBIN, DIRECT: 1.81
BILIRUBIN, TOTAL: 2.6
PROTEIN, TOTAL: 7

## 2025-05-21 NOTE — HPI-TODAY'S VISIT:
Dear Roel Trejo,   May 20 labs show total protein 7 albumin 3.8 and these are normal and your bilirubin while elevated at 2.6 is down from 3.7 in March and 3.9 in February.  Good to see that this is cycled down.   Direct bilirubin also lower at 1.81.   Alk phos lower at 537 from 617 in March.  AST down to 105 and ALT down to 82 from March  and ALT 91.   Did you change anything that could explain changes?  Any new meds or vitamins that were changed or stopped?   Dr. Longoria

## 2025-07-07 ENCOUNTER — LAB OUTSIDE AN ENCOUNTER (OUTPATIENT)
Dept: URBAN - METROPOLITAN AREA TELEHEALTH 2 | Facility: TELEHEALTH | Age: 85
End: 2025-07-07

## 2025-07-09 ENCOUNTER — TELEPHONE ENCOUNTER (OUTPATIENT)
Dept: URBAN - METROPOLITAN AREA CLINIC 86 | Facility: CLINIC | Age: 85
End: 2025-07-09

## 2025-07-09 LAB
ALBUMIN: 3.7
ALKALINE PHOSPHATASE: 477
ALT (SGPT): 67
AST (SGOT): 78
BASO (ABSOLUTE): 0
BASOS: 1
BILIRUBIN, TOTAL: 1.9
BUN/CREATININE RATIO: 16
BUN: 15
CALCIUM: 9.5
CARBON DIOXIDE, TOTAL: 26
CHLORIDE: 104
CREATININE: 0.93
EGFR: 81
EOS (ABSOLUTE): 0.2
EOS: 5
GLOBULIN, TOTAL: 3.2
GLUCOSE: 152
HEMATOCRIT: 42.2
HEMATOLOGY COMMENTS:: (no result)
HEMOGLOBIN: 13.5
IMMATURE CELLS: (no result)
IMMATURE GRANS (ABS): 0
IMMATURE GRANULOCYTES: 0
INR: 1.1
LYMPHS (ABSOLUTE): 1.7
LYMPHS: 45
MCH: 30.6
MCHC: 32
MCV: 96
MONOCYTES(ABSOLUTE): 0.5
MONOCYTES: 12
NEUTROPHILS (ABSOLUTE): 1.4
NEUTROPHILS: 37
NRBC: (no result)
PLATELETS: 105
POTASSIUM: 4.3
PROTEIN, TOTAL: 6.9
PROTHROMBIN TIME: 12
RBC: 4.41
RDW: 13.2
SODIUM: 141
WBC: 3.8

## 2025-07-09 NOTE — HPI-TODAY'S VISIT:
Dear Roel Trejo, July 8 labs show us that your INR is normal at 1.1 and prothrombin time is down to 12.0 and I was pleased to see that.   Sugar continues to be about 152 from previous 116.  BUN was 15 creatinine 0.93 sodium 141 potassium 4.3 chloride 104 CO2 of 26 albumin 3.7 which is normal.   Bilirubin down to 1.9 from 3.6 and alk phos down to 477 from 609 and AST of 78 from 114 and ALT of 67 from 94 so very good to see this cycling down!   WBC 3.8 hemoglobin 13.5 platelet count a little lower at 105 MCV 96 with normal neutrophils and lymphocytes.   Unclear what caused the labs to go up (likely an immune flare from some recent irritating event) and even more unclear what is causing it to go down (likely the tincture of time) but regardless happy to see the trend going down.    Most likely what ever was irritating the liver either intrinsically with your immune system or extrinsically from other cause is not doing that as much now and this is settling.   Please redo labs pre July 23 appt.  Dr. Longoria.

## 2025-07-23 ENCOUNTER — OFFICE VISIT (OUTPATIENT)
Dept: URBAN - METROPOLITAN AREA TELEHEALTH 2 | Facility: TELEHEALTH | Age: 85
End: 2025-07-23
Payer: MEDICARE

## 2025-07-23 DIAGNOSIS — K74.02 HEPATIC FIBROSIS, ADVANCED FIBROSIS: ICD-10-CM

## 2025-07-23 DIAGNOSIS — K82.4 POLYP OF GALLBLADDER: ICD-10-CM

## 2025-07-23 DIAGNOSIS — K74.60 CIRRHOSIS OF LIVER WITHOUT ASCITES, UNSPECIFIED HEPATIC CIRRHOSIS TYPE: ICD-10-CM

## 2025-07-23 DIAGNOSIS — N28.1 RENAL CYST: ICD-10-CM

## 2025-07-23 DIAGNOSIS — K76.0 FATTY LIVER: ICD-10-CM

## 2025-07-23 DIAGNOSIS — K76.6 PORTAL HYPERTENSION: ICD-10-CM

## 2025-07-23 DIAGNOSIS — N20.0 RENAL STONE: ICD-10-CM

## 2025-07-23 DIAGNOSIS — E11.9 DIABETES: ICD-10-CM

## 2025-07-23 DIAGNOSIS — R74.8 ELEVATED ALKALINE PHOSPHATASE LEVEL: ICD-10-CM

## 2025-07-23 DIAGNOSIS — E66.3 OVERWEIGHT: ICD-10-CM

## 2025-07-23 DIAGNOSIS — M19.90 ARTHRITIS: ICD-10-CM

## 2025-07-23 DIAGNOSIS — Z79.899 HIGH RISK MEDICATION USE: ICD-10-CM

## 2025-07-23 DIAGNOSIS — K74.3 PBC (PRIMARY BILIARY CIRRHOSIS): ICD-10-CM

## 2025-07-23 DIAGNOSIS — L29.9 PRURITIC CONDITION: ICD-10-CM

## 2025-07-23 DIAGNOSIS — Z13.820 SCREENING FOR OSTEOPOROSIS: ICD-10-CM

## 2025-07-23 DIAGNOSIS — R94.5 LIVER FUNCTION STUDY, ABNORMAL: ICD-10-CM

## 2025-07-23 PROCEDURE — 99215 OFFICE O/P EST HI 40 MIN: CPT

## 2025-07-23 RX ORDER — URSODIOL 500 MG/1
1 TABLET TABLET ORAL
Qty: 180 | Refills: 1
Start: 2025-07-13

## 2025-07-23 RX ORDER — GABAPENTIN 300 MG/1
1 CAPSULE CAPSULE ORAL TWICE A DAY
Status: ACTIVE | COMMUNITY

## 2025-07-23 RX ORDER — MIRABEGRON 25 MG/1
1 TABLET TABLET, FILM COATED, EXTENDED RELEASE ORAL ONCE A DAY
Status: ACTIVE | COMMUNITY

## 2025-07-23 RX ORDER — OXYCODONE HYDROCHLORIDE 5 MG/1
1 TABLET AS NEEDED TABLET ORAL
Status: ACTIVE | COMMUNITY

## 2025-07-23 RX ORDER — AMLODIPINE BESYLATE 5 MG/1
1 TABLET TABLET ORAL ONCE A DAY
Refills: 0 | Status: ACTIVE | COMMUNITY
Start: 1900-01-01

## 2025-07-23 RX ORDER — ATENOLOL 25 MG/1
TAKE ONE TABLET BY MOUTH ONE TIME DAILY TABLET ORAL
Qty: 90 UNSPECIFIED | Refills: 0 | Status: ACTIVE | COMMUNITY

## 2025-07-23 RX ORDER — FAMOTIDINE 40 MG/1
1 TABLET AT BEDTIME TABLET, FILM COATED ORAL ONCE A DAY
Status: ACTIVE | COMMUNITY

## 2025-07-23 RX ORDER — URSODIOL 500 MG/1
1 TABLET TABLET ORAL
Qty: 180 | Refills: 1 | Status: ACTIVE | COMMUNITY